# Patient Record
Sex: MALE | Race: OTHER | NOT HISPANIC OR LATINO | URBAN - METROPOLITAN AREA
[De-identification: names, ages, dates, MRNs, and addresses within clinical notes are randomized per-mention and may not be internally consistent; named-entity substitution may affect disease eponyms.]

---

## 2017-05-02 ENCOUNTER — EMERGENCY (EMERGENCY)
Facility: HOSPITAL | Age: 62
LOS: 0 days | Discharge: HOME | End: 2017-05-02
Attending: EMERGENCY MEDICINE
Payer: COMMERCIAL

## 2017-05-02 VITALS
TEMPERATURE: 98 F | SYSTOLIC BLOOD PRESSURE: 125 MMHG | HEART RATE: 72 BPM | DIASTOLIC BLOOD PRESSURE: 88 MMHG | WEIGHT: 315 LBS | RESPIRATION RATE: 20 BRPM | HEIGHT: 70 IN | OXYGEN SATURATION: 95 %

## 2017-05-02 VITALS
HEART RATE: 67 BPM | SYSTOLIC BLOOD PRESSURE: 146 MMHG | RESPIRATION RATE: 17 BRPM | OXYGEN SATURATION: 100 % | DIASTOLIC BLOOD PRESSURE: 76 MMHG | TEMPERATURE: 98 F

## 2017-05-02 PROCEDURE — 93971 EXTREMITY STUDY: CPT | Mod: 26,RT

## 2017-05-02 PROCEDURE — 99284 EMERGENCY DEPT VISIT MOD MDM: CPT | Mod: 25

## 2017-05-02 PROCEDURE — 73562 X-RAY EXAM OF KNEE 3: CPT | Mod: 26,RT

## 2017-05-02 RX ORDER — IBUPROFEN 200 MG
1 TABLET ORAL
Qty: 15 | Refills: 0 | OUTPATIENT
Start: 2017-05-02 | End: 2017-05-07

## 2017-05-02 NOTE — ED PROVIDER NOTE - OBJECTIVE STATEMENT
61 years old male walked in with wife c/o pain 7/10 to right knee and right calf since yesterday. Pt sts he fell on his right knee while he was running yesterday and pt sts he felt the tightness to right calf just before he fell. Pt denies headache, loc, dizziness, blurred visions, light sensitivities, neck/back pain, sob, chest pain, focal/distal weakness or numbness, cough, sob, chest pain, nausea, vomiting, fever, chills, abd pain, hematuria, dysuria or irregular bowel movements.

## 2017-05-02 NOTE — ED PROVIDER NOTE - CONSTITUTIONAL, MLM
normal... Well appearing, well nourished, awake, alert, oriented to person, place, time/situation and in no apparent distress. Speaking in clear full sentence no nasal flaring no shoulders retractions appears comfortable while sitting up at the edge of the stretcher in a bright light room

## 2017-05-02 NOTE — ED PROVIDER NOTE - PROGRESS NOTE DETAILS
pt is ambulating with normal gaits denies focal/distal weakness or numbness. Pt is given right knee immobilizer and crutches

## 2017-05-02 NOTE — ED PROVIDER NOTE - MEDICAL DECISION MAKING DETAILS
hx, exam, xray of right knee, us of right leg, reeval Pt has no distal lower extremity pain or numbness the calf is not swelling compartment syndrome is unlikely. Pt however is advised to return immediately in pain persist or worsen or increasing swelling

## 2017-05-02 NOTE — ED PROVIDER NOTE - RESPIRATORY, MLM
I have personally performed a face to face diagnostic evaluation on this patient. I have reviewed the PA note and agree with the history, exam, and plan of care, except as noted. Breath sounds clear and equal bilaterally.

## 2017-05-02 NOTE — ED ADULT NURSE NOTE - OBJECTIVE STATEMENT
patient received, alert and oriented x3, complaining of right calf pain and stiffness from falling yesterday around 1800. patient denies hitting head, denies n/v/d.

## 2017-05-02 NOTE — ED PROCEDURE NOTE - NS ED PERI VASCULAR NEG
no swelling/no cyanosis of extremity/fingers/toes warm to touch/no paresthesia/capillary refill time < 2 seconds
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
## 2017-05-03 DIAGNOSIS — M10.9 GOUT, UNSPECIFIED: ICD-10-CM

## 2017-05-03 DIAGNOSIS — M25.561 PAIN IN RIGHT KNEE: ICD-10-CM
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
## 2017-05-03 DIAGNOSIS — E78.00 PURE HYPERCHOLESTEROLEMIA, UNSPECIFIED: ICD-10-CM

## 2017-05-03 DIAGNOSIS — I10 ESSENTIAL (PRIMARY) HYPERTENSION: ICD-10-CM
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
## 2017-05-03 DIAGNOSIS — S80.01XA CONTUSION OF RIGHT KNEE, INITIAL ENCOUNTER: ICD-10-CM

## 2017-12-12 ENCOUNTER — EMERGENCY (EMERGENCY)
Facility: HOSPITAL | Age: 62
LOS: 0 days | Discharge: HOME | End: 2017-12-12
Attending: EMERGENCY MEDICINE
Payer: COMMERCIAL

## 2017-12-12 VITALS
RESPIRATION RATE: 16 BRPM | OXYGEN SATURATION: 96 % | DIASTOLIC BLOOD PRESSURE: 85 MMHG | SYSTOLIC BLOOD PRESSURE: 134 MMHG | TEMPERATURE: 98 F | HEART RATE: 61 BPM

## 2017-12-12 VITALS
HEIGHT: 70 IN | WEIGHT: 315 LBS | TEMPERATURE: 98 F | SYSTOLIC BLOOD PRESSURE: 166 MMHG | DIASTOLIC BLOOD PRESSURE: 96 MMHG | HEART RATE: 66 BPM | RESPIRATION RATE: 15 BRPM | OXYGEN SATURATION: 97 %

## 2017-12-12 DIAGNOSIS — I10 ESSENTIAL (PRIMARY) HYPERTENSION: ICD-10-CM
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
## 2017-12-12 DIAGNOSIS — M54.5 LOW BACK PAIN: ICD-10-CM

## 2017-12-12 DIAGNOSIS — S39.012A STRAIN OF MUSCLE, FASCIA AND TENDON OF LOWER BACK, INITIAL ENCOUNTER: ICD-10-CM

## 2017-12-12 DIAGNOSIS — Z79.1 LONG TERM (CURRENT) USE OF NON-STEROIDAL ANTI-INFLAMMATORIES (NSAID): ICD-10-CM

## 2017-12-12 DIAGNOSIS — G89.29 OTHER CHRONIC PAIN: ICD-10-CM
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
## 2017-12-12 PROCEDURE — 99284 EMERGENCY DEPT VISIT MOD MDM: CPT | Mod: 25

## 2017-12-12 RX ORDER — IBUPROFEN 200 MG
600 TABLET ORAL ONCE
Qty: 0 | Refills: 0 | Status: COMPLETED | OUTPATIENT
Start: 2017-12-12 | End: 2017-12-12

## 2017-12-12 RX ORDER — IBUPROFEN 200 MG
1 TABLET ORAL
Qty: 28 | Refills: 0
Start: 2017-12-12 | End: 2017-12-18

## 2017-12-12 RX ORDER — CYCLOBENZAPRINE HYDROCHLORIDE 10 MG/1
1 TABLET, FILM COATED ORAL
Qty: 15 | Refills: 0
Start: 2017-12-12 | End: 2017-12-16

## 2017-12-12 RX ORDER — CYCLOBENZAPRINE HYDROCHLORIDE 10 MG/1
10 TABLET, FILM COATED ORAL ONCE
Qty: 0 | Refills: 0 | Status: COMPLETED | OUTPATIENT
Start: 2017-12-12 | End: 2017-12-12

## 2017-12-12 RX ADMIN — Medication 600 MILLIGRAM(S): at 08:32

## 2017-12-12 RX ADMIN — CYCLOBENZAPRINE HYDROCHLORIDE 10 MILLIGRAM(S): 10 TABLET, FILM COATED ORAL at 08:30

## 2017-12-12 NOTE — ED ADULT TRIAGE NOTE - CHIEF COMPLAINT QUOTE
I have lower back pains started last Monday. He walked from [address]. Afterwards he had the pain. The pain is excruciating

## 2017-12-12 NOTE — ED ADULT NURSE NOTE - OBJECTIVE STATEMENT
Pt c/o of lower back pain 7/10 radiating upwards toward "center of back". Pt states that pain started last monday after a morning walk. Pt states he took alleeve otc with no relief.

## 2019-03-15 NOTE — ED ADULT TRIAGE NOTE - CADM TRG TX PRIOR TO ARRIVAL
medications
I performed the initial face to face bedside interview with this patient regarding history of present illness, review of symptoms and past medical, social and family history.  I completed an independent physical examination.  I was the initial provider who evaluated this patient.  The history, review of symptoms and examination was documented by the PA in my presence and I attest to the accuracy of the documentation.  I have signed out the follow up of any pending tests (i.e. labs, radiological studies) to the PA.  I have discussed the patient’s plan of care and disposition with the PA.

## 2021-12-28 PROBLEM — M10.9 GOUT, UNSPECIFIED: Chronic | Status: ACTIVE | Noted: 2017-05-02

## 2021-12-28 PROBLEM — E78.00 PURE HYPERCHOLESTEROLEMIA, UNSPECIFIED: Chronic | Status: ACTIVE | Noted: 2017-05-02

## 2021-12-28 PROBLEM — I10 ESSENTIAL (PRIMARY) HYPERTENSION: Chronic | Status: ACTIVE | Noted: 2017-05-02

## 2022-04-14 ENCOUNTER — NON-APPOINTMENT (OUTPATIENT)
Age: 67
End: 2022-04-14

## 2022-04-14 ENCOUNTER — APPOINTMENT (OUTPATIENT)
Dept: GASTROENTEROLOGY | Facility: CLINIC | Age: 67
End: 2022-04-14
Payer: MEDICARE

## 2022-04-14 VITALS
WEIGHT: 315 LBS | DIASTOLIC BLOOD PRESSURE: 85 MMHG | SYSTOLIC BLOOD PRESSURE: 140 MMHG | BODY MASS INDEX: 45.1 KG/M2 | OXYGEN SATURATION: 98 % | TEMPERATURE: 98.8 F | HEIGHT: 70 IN | HEART RATE: 78 BPM

## 2022-04-14 DIAGNOSIS — I10 ESSENTIAL (PRIMARY) HYPERTENSION: ICD-10-CM

## 2022-04-14 DIAGNOSIS — Z78.9 OTHER SPECIFIED HEALTH STATUS: ICD-10-CM

## 2022-04-14 DIAGNOSIS — E66.01 MORBID (SEVERE) OBESITY DUE TO EXCESS CALORIES: ICD-10-CM

## 2022-04-14 DIAGNOSIS — Z12.11 ENCOUNTER FOR SCREENING FOR MALIGNANT NEOPLASM OF COLON: ICD-10-CM

## 2022-04-14 DIAGNOSIS — M10.9 GOUT, UNSPECIFIED: ICD-10-CM

## 2022-04-14 DIAGNOSIS — K59.09 OTHER CONSTIPATION: ICD-10-CM

## 2022-04-14 PROBLEM — Z00.00 ENCOUNTER FOR PREVENTIVE HEALTH EXAMINATION: Status: ACTIVE | Noted: 2022-04-14

## 2022-04-14 PROCEDURE — 99203 OFFICE O/P NEW LOW 30 MIN: CPT

## 2022-04-14 RX ORDER — ALLOPURINOL 300 MG/1
300 TABLET ORAL
Refills: 0 | Status: ACTIVE | COMMUNITY

## 2022-04-14 RX ORDER — SODIUM SULFATE, POTASSIUM SULFATE, MAGNESIUM SULFATE 17.5; 3.13; 1.6 G/ML; G/ML; G/ML
17.5-3.13-1.6 SOLUTION, CONCENTRATE ORAL
Qty: 1 | Refills: 0 | Status: DISCONTINUED | COMMUNITY
Start: 2022-04-14 | End: 2022-04-14

## 2022-04-14 RX ORDER — POLYETHYLENE GLYCOL 3350 AND ELECTROLYTES WITH LEMON FLAVOR 236; 22.74; 6.74; 5.86; 2.97 G/4L; G/4L; G/4L; G/4L; G/4L
236 POWDER, FOR SOLUTION ORAL
Qty: 1 | Refills: 0 | Status: ACTIVE | COMMUNITY
Start: 2022-04-14 | End: 1900-01-01

## 2022-04-14 RX ORDER — AMLODIPINE BESYLATE 5 MG/1
TABLET ORAL
Refills: 0 | Status: ACTIVE | COMMUNITY

## 2022-04-14 RX ORDER — METOPROLOL TARTRATE 100 MG/1
100 TABLET, FILM COATED ORAL
Refills: 0 | Status: ACTIVE | COMMUNITY

## 2022-04-22 ENCOUNTER — NON-APPOINTMENT (OUTPATIENT)
Age: 67
End: 2022-04-22

## 2022-05-19 ENCOUNTER — OUTPATIENT (OUTPATIENT)
Dept: OUTPATIENT SERVICES | Facility: HOSPITAL | Age: 67
LOS: 1 days | End: 2022-05-19
Payer: MEDICARE

## 2022-05-19 VITALS
WEIGHT: 315 LBS | RESPIRATION RATE: 17 BRPM | HEIGHT: 69.5 IN | TEMPERATURE: 97 F | HEART RATE: 84 BPM | SYSTOLIC BLOOD PRESSURE: 139 MMHG | OXYGEN SATURATION: 96 % | DIASTOLIC BLOOD PRESSURE: 94 MMHG

## 2022-05-19 DIAGNOSIS — R06.83 SNORING: ICD-10-CM

## 2022-05-19 DIAGNOSIS — Z12.11 ENCOUNTER FOR SCREENING FOR MALIGNANT NEOPLASM OF COLON: ICD-10-CM

## 2022-05-19 DIAGNOSIS — R94.31 ABNORMAL ELECTROCARDIOGRAM [ECG] [EKG]: ICD-10-CM

## 2022-05-19 DIAGNOSIS — Z98.890 OTHER SPECIFIED POSTPROCEDURAL STATES: Chronic | ICD-10-CM

## 2022-05-19 DIAGNOSIS — E66.01 MORBID (SEVERE) OBESITY DUE TO EXCESS CALORIES: ICD-10-CM

## 2022-05-19 DIAGNOSIS — I10 ESSENTIAL (PRIMARY) HYPERTENSION: ICD-10-CM

## 2022-05-19 LAB
ANION GAP SERPL CALC-SCNC: 10 MMOL/L — SIGNIFICANT CHANGE UP (ref 7–14)
BUN SERPL-MCNC: 18 MG/DL — SIGNIFICANT CHANGE UP (ref 7–23)
CALCIUM SERPL-MCNC: 9 MG/DL — SIGNIFICANT CHANGE UP (ref 8.4–10.5)
CHLORIDE SERPL-SCNC: 104 MMOL/L — SIGNIFICANT CHANGE UP (ref 98–107)
CO2 SERPL-SCNC: 26 MMOL/L — SIGNIFICANT CHANGE UP (ref 22–31)
CREAT SERPL-MCNC: 0.84 MG/DL — SIGNIFICANT CHANGE UP (ref 0.5–1.3)
EGFR: 96 ML/MIN/1.73M2 — SIGNIFICANT CHANGE UP
GLUCOSE SERPL-MCNC: 103 MG/DL — HIGH (ref 70–99)
HCT VFR BLD CALC: 49.9 % — SIGNIFICANT CHANGE UP (ref 39–50)
HGB BLD-MCNC: 15.3 G/DL — SIGNIFICANT CHANGE UP (ref 13–17)
MCHC RBC-ENTMCNC: 19.8 PG — LOW (ref 27–34)
MCHC RBC-ENTMCNC: 30.7 GM/DL — LOW (ref 32–36)
MCV RBC AUTO: 64.6 FL — LOW (ref 80–100)
NRBC # BLD: 0 /100 WBCS — SIGNIFICANT CHANGE UP
NRBC # FLD: 0 K/UL — SIGNIFICANT CHANGE UP
PLATELET # BLD AUTO: 245 K/UL — SIGNIFICANT CHANGE UP (ref 150–400)
POTASSIUM SERPL-MCNC: 4.6 MMOL/L — SIGNIFICANT CHANGE UP (ref 3.5–5.3)
POTASSIUM SERPL-SCNC: 4.6 MMOL/L — SIGNIFICANT CHANGE UP (ref 3.5–5.3)
RBC # BLD: >7 M/UL — HIGH (ref 4.2–5.8)
RBC # FLD: 19.8 % — HIGH (ref 10.3–14.5)
SODIUM SERPL-SCNC: 140 MMOL/L — SIGNIFICANT CHANGE UP (ref 135–145)
WBC # BLD: 6.57 K/UL — SIGNIFICANT CHANGE UP (ref 3.8–10.5)
WBC # FLD AUTO: 6.57 K/UL — SIGNIFICANT CHANGE UP (ref 3.8–10.5)

## 2022-05-19 PROCEDURE — 93010 ELECTROCARDIOGRAM REPORT: CPT

## 2022-05-19 RX ORDER — ALLOPURINOL 300 MG
1 TABLET ORAL
Qty: 0 | Refills: 0 | DISCHARGE

## 2022-05-19 RX ORDER — ROSUVASTATIN CALCIUM 5 MG/1
1 TABLET ORAL
Qty: 0 | Refills: 0 | DISCHARGE

## 2022-05-19 RX ORDER — METOPROLOL TARTRATE 50 MG
1 TABLET ORAL
Qty: 0 | Refills: 0 | DISCHARGE

## 2022-05-19 RX ORDER — AMLODIPINE BESYLATE 2.5 MG/1
1 TABLET ORAL
Qty: 0 | Refills: 0 | DISCHARGE

## 2022-05-19 RX ORDER — L.ACIDOPH/B.ANIMALIS/B.LONGUM 15B CELL
1 CAPSULE ORAL
Qty: 0 | Refills: 0 | DISCHARGE

## 2022-05-19 NOTE — H&P PST ADULT - NEGATIVE MUSCULOSKELETAL SYMPTOMS
no arthritis/no joint swelling/no neck pain no arthritis/no joint swelling/no muscle cramps/no muscle weakness/no neck pain

## 2022-05-19 NOTE — H&P PST ADULT - PROBLEM SELECTOR PLAN 1
preop for colonoscopy on 6/3/22  preop instructions given, pt verbalized understanding  GI prophylaxis provided  pt informed COVID testing must be done 72 hours prior to surgery

## 2022-05-19 NOTE — H&P PST ADULT - HISTORY OF PRESENT ILLNESS
67 y/o male presents to PST preop for colonoscopy. preop dx of encounter screening for malignant neoplasm of colon. pt's last screening colonoscopy was 8 yrs ago. Pt states "baby polyps were removed".

## 2022-05-19 NOTE — H&P PST ADULT - NSICDXFAMILYHX_GEN_ALL_CORE_FT
FAMILY HISTORY:  Father  Still living? Unknown  FH: diabetes mellitus, Age at diagnosis: Age Unknown    Mother  Still living? Unknown  Family history of pacemaker, Age at diagnosis: Age Unknown

## 2022-05-19 NOTE — H&P PST ADULT - NSICDXPASTMEDICALHX_GEN_ALL_CORE_FT
PAST MEDICAL HISTORY:  Encounter for screening for malignant neoplasm of colon     Gout     Hypercholesteremia     Hypertension     Morbid obesity

## 2022-05-19 NOTE — H&P PST ADULT - NSANTHOSAYNRD_GEN_A_CORE
No. SHILOH screening performed.  STOP BANG Legend: 0-2 = LOW Risk; 3-4 = INTERMEDIATE Risk; 5-8 = HIGH Risk

## 2022-06-03 ENCOUNTER — OUTPATIENT (OUTPATIENT)
Dept: OUTPATIENT SERVICES | Facility: HOSPITAL | Age: 67
LOS: 1 days | Discharge: HOME | End: 2022-06-03
Payer: MEDICARE

## 2022-06-03 ENCOUNTER — RESULT REVIEW (OUTPATIENT)
Age: 67
End: 2022-06-03

## 2022-06-03 ENCOUNTER — APPOINTMENT (OUTPATIENT)
Dept: GASTROENTEROLOGY | Facility: HOSPITAL | Age: 67
End: 2022-06-03

## 2022-06-03 VITALS
DIASTOLIC BLOOD PRESSURE: 59 MMHG | OXYGEN SATURATION: 93 % | TEMPERATURE: 98 F | SYSTOLIC BLOOD PRESSURE: 103 MMHG | HEART RATE: 73 BPM | RESPIRATION RATE: 20 BRPM

## 2022-06-03 VITALS
SYSTOLIC BLOOD PRESSURE: 122 MMHG | HEART RATE: 64 BPM | DIASTOLIC BLOOD PRESSURE: 82 MMHG | OXYGEN SATURATION: 93 % | RESPIRATION RATE: 19 BRPM

## 2022-06-03 DIAGNOSIS — Z98.890 OTHER SPECIFIED POSTPROCEDURAL STATES: Chronic | ICD-10-CM

## 2022-06-03 DIAGNOSIS — Z12.11 ENCOUNTER FOR SCREENING FOR MALIGNANT NEOPLASM OF COLON: ICD-10-CM

## 2022-06-03 PROCEDURE — 88305 TISSUE EXAM BY PATHOLOGIST: CPT | Mod: 26

## 2022-06-03 PROCEDURE — 45385 COLONOSCOPY W/LESION REMOVAL: CPT

## 2022-06-08 LAB — SURGICAL PATHOLOGY STUDY: SIGNIFICANT CHANGE UP

## 2024-04-18 NOTE — ED PROVIDER NOTE - SKIN NEGATIVE STATEMENT, MLM
no abrasions, no jaundice, no lesions, no pruritis, and no rashes.
PAST SURGICAL HISTORY:  H/O knee surgery

## 2024-10-21 NOTE — ED ADULT NURSE NOTE - NS TRANSFER PATIENT BELONGINGS
Caller: NANCY    Relationship:     Best call back number: [phone]    What was the call regarding: NEED TO FAX EITHER THE LAST OFFICE NOTE OR CLINICAL NOTES -[phone], THIS IS NEEDED DUE TO A TEST DR. HARLEY HAS ORDERED ON THE PATIENT.   Clothing

## 2024-10-30 ENCOUNTER — INPATIENT (INPATIENT)
Facility: HOSPITAL | Age: 69
LOS: 14 days | Discharge: HOME HEALTH | End: 2024-11-14
Attending: INTERNAL MEDICINE | Admitting: INTERNAL MEDICINE
Payer: MEDICARE

## 2024-10-30 VITALS
HEART RATE: 121 BPM | SYSTOLIC BLOOD PRESSURE: 121 MMHG | HEIGHT: 70 IN | TEMPERATURE: 98 F | OXYGEN SATURATION: 97 % | DIASTOLIC BLOOD PRESSURE: 86 MMHG | RESPIRATION RATE: 20 BRPM | WEIGHT: 315 LBS

## 2024-10-30 DIAGNOSIS — Z98.890 OTHER SPECIFIED POSTPROCEDURAL STATES: Chronic | ICD-10-CM

## 2024-10-30 LAB
ALBUMIN SERPL ELPH-MCNC: 3.8 G/DL — SIGNIFICANT CHANGE UP (ref 3.3–5)
ALP SERPL-CCNC: 60 U/L — SIGNIFICANT CHANGE UP (ref 40–120)
ALT FLD-CCNC: 29 U/L — SIGNIFICANT CHANGE UP (ref 12–78)
ANION GAP SERPL CALC-SCNC: 8 MMOL/L — SIGNIFICANT CHANGE UP (ref 5–17)
APTT BLD: 39.5 SEC — HIGH (ref 24.5–35.6)
AST SERPL-CCNC: 33 U/L — SIGNIFICANT CHANGE UP (ref 15–37)
BASOPHILS # BLD AUTO: 0.06 K/UL — SIGNIFICANT CHANGE UP (ref 0–0.2)
BASOPHILS NFR BLD AUTO: 0.6 % — SIGNIFICANT CHANGE UP (ref 0–2)
BILIRUB SERPL-MCNC: 1.9 MG/DL — HIGH (ref 0.2–1.2)
BUN SERPL-MCNC: 22 MG/DL — SIGNIFICANT CHANGE UP (ref 7–23)
CALCIUM SERPL-MCNC: 8.9 MG/DL — SIGNIFICANT CHANGE UP (ref 8.5–10.1)
CHLORIDE SERPL-SCNC: 109 MMOL/L — HIGH (ref 96–108)
CO2 SERPL-SCNC: 25 MMOL/L — SIGNIFICANT CHANGE UP (ref 22–31)
CREAT SERPL-MCNC: 1.05 MG/DL — SIGNIFICANT CHANGE UP (ref 0.5–1.3)
D DIMER BLD IA.RAPID-MCNC: 424 NG/ML DDU — HIGH
EGFR: 77 ML/MIN/1.73M2 — SIGNIFICANT CHANGE UP
EOSINOPHIL # BLD AUTO: 0.16 K/UL — SIGNIFICANT CHANGE UP (ref 0–0.5)
EOSINOPHIL NFR BLD AUTO: 1.7 % — SIGNIFICANT CHANGE UP (ref 0–6)
FLUAV AG NPH QL: SIGNIFICANT CHANGE UP
FLUBV AG NPH QL: SIGNIFICANT CHANGE UP
GLUCOSE SERPL-MCNC: 118 MG/DL — HIGH (ref 70–99)
HCT VFR BLD CALC: 44.8 % — SIGNIFICANT CHANGE UP (ref 39–50)
HGB BLD-MCNC: 13.6 G/DL — SIGNIFICANT CHANGE UP (ref 13–17)
IMM GRANULOCYTES NFR BLD AUTO: 0.2 % — SIGNIFICANT CHANGE UP (ref 0–0.9)
INR BLD: 1.39 RATIO — HIGH (ref 0.85–1.16)
LIDOCAIN IGE QN: 35 U/L — SIGNIFICANT CHANGE UP (ref 13–75)
LYMPHOCYTES # BLD AUTO: 1.49 K/UL — SIGNIFICANT CHANGE UP (ref 1–3.3)
LYMPHOCYTES # BLD AUTO: 15.7 % — SIGNIFICANT CHANGE UP (ref 13–44)
MAGNESIUM SERPL-MCNC: 1.9 MG/DL — SIGNIFICANT CHANGE UP (ref 1.6–2.6)
MCHC RBC-ENTMCNC: 19.6 PG — LOW (ref 27–34)
MCHC RBC-ENTMCNC: 30.4 G/DL — LOW (ref 32–36)
MCV RBC AUTO: 64.6 FL — LOW (ref 80–100)
MONOCYTES # BLD AUTO: 1.33 K/UL — HIGH (ref 0–0.9)
MONOCYTES NFR BLD AUTO: 14 % — SIGNIFICANT CHANGE UP (ref 2–14)
NEUTROPHILS # BLD AUTO: 6.43 K/UL — SIGNIFICANT CHANGE UP (ref 1.8–7.4)
NEUTROPHILS NFR BLD AUTO: 67.8 % — SIGNIFICANT CHANGE UP (ref 43–77)
NRBC # BLD: 0 /100 WBCS — SIGNIFICANT CHANGE UP (ref 0–0)
NT-PROBNP SERPL-SCNC: 2339 PG/ML — HIGH (ref 0–125)
PLATELET # BLD AUTO: 212 K/UL — SIGNIFICANT CHANGE UP (ref 150–400)
POTASSIUM SERPL-MCNC: 4.7 MMOL/L — SIGNIFICANT CHANGE UP (ref 3.5–5.3)
POTASSIUM SERPL-SCNC: 4.7 MMOL/L — SIGNIFICANT CHANGE UP (ref 3.5–5.3)
PROT SERPL-MCNC: 7.2 GM/DL — SIGNIFICANT CHANGE UP (ref 6–8.3)
PROTHROM AB SERPL-ACNC: 15.7 SEC — HIGH (ref 9.9–13.4)
RBC # BLD: 6.94 M/UL — HIGH (ref 4.2–5.8)
RBC # FLD: 18.7 % — HIGH (ref 10.3–14.5)
SARS-COV-2 RNA SPEC QL NAA+PROBE: SIGNIFICANT CHANGE UP
SODIUM SERPL-SCNC: 142 MMOL/L — SIGNIFICANT CHANGE UP (ref 135–145)
TROPONIN I, HIGH SENSITIVITY RESULT: 20 NG/L — SIGNIFICANT CHANGE UP
WBC # BLD: 9.49 K/UL — SIGNIFICANT CHANGE UP (ref 3.8–10.5)
WBC # FLD AUTO: 9.49 K/UL — SIGNIFICANT CHANGE UP (ref 3.8–10.5)

## 2024-10-30 PROCEDURE — 93010 ELECTROCARDIOGRAM REPORT: CPT

## 2024-10-30 PROCEDURE — 71275 CT ANGIOGRAPHY CHEST: CPT | Mod: 26,MC

## 2024-10-30 PROCEDURE — 71046 X-RAY EXAM CHEST 2 VIEWS: CPT | Mod: 26

## 2024-10-30 PROCEDURE — 99285 EMERGENCY DEPT VISIT HI MDM: CPT

## 2024-10-30 RX ORDER — METOPROLOL TARTRATE 50 MG
5 TABLET ORAL ONCE
Refills: 0 | Status: COMPLETED | OUTPATIENT
Start: 2024-10-30 | End: 2024-10-30

## 2024-10-30 RX ORDER — HEPARIN SODIUM 10000 [USP'U]/ML
INJECTION INTRAVENOUS; SUBCUTANEOUS
Qty: 25000 | Refills: 0 | Status: DISCONTINUED | OUTPATIENT
Start: 2024-10-30 | End: 2024-11-01

## 2024-10-30 RX ORDER — ALLOPURINOL 100 MG
1 TABLET ORAL
Refills: 0 | DISCHARGE

## 2024-10-30 RX ORDER — ROSUVASTATIN CALCIUM 10 MG
1 TABLET ORAL
Refills: 0 | DISCHARGE

## 2024-10-30 RX ORDER — HEPARIN SODIUM 10000 [USP'U]/ML
10000 INJECTION INTRAVENOUS; SUBCUTANEOUS ONCE
Refills: 0 | Status: COMPLETED | OUTPATIENT
Start: 2024-10-30 | End: 2024-10-30

## 2024-10-30 RX ORDER — HEPARIN SODIUM 10000 [USP'U]/ML
10000 INJECTION INTRAVENOUS; SUBCUTANEOUS EVERY 6 HOURS
Refills: 0 | Status: DISCONTINUED | OUTPATIENT
Start: 2024-10-30 | End: 2024-11-01

## 2024-10-30 RX ORDER — FUROSEMIDE 40 MG
80 TABLET ORAL ONCE
Refills: 0 | Status: COMPLETED | OUTPATIENT
Start: 2024-10-30 | End: 2024-10-30

## 2024-10-30 RX ORDER — ADENOSINE 3 MG/ML
6 INJECTION INTRAVENOUS ONCE
Refills: 0 | Status: DISCONTINUED | OUTPATIENT
Start: 2024-10-30 | End: 2024-10-30

## 2024-10-30 RX ORDER — HEPARIN SODIUM 10000 [USP'U]/ML
5000 INJECTION INTRAVENOUS; SUBCUTANEOUS EVERY 6 HOURS
Refills: 0 | Status: DISCONTINUED | OUTPATIENT
Start: 2024-10-30 | End: 2024-11-01

## 2024-10-30 RX ADMIN — HEPARIN SODIUM 2400 UNIT(S)/HR: 10000 INJECTION INTRAVENOUS; SUBCUTANEOUS at 23:29

## 2024-10-30 RX ADMIN — Medication 5 MILLIGRAM(S): at 22:05

## 2024-10-30 RX ADMIN — Medication 5 MILLIGRAM(S): at 21:10

## 2024-10-30 RX ADMIN — Medication 5 MILLIGRAM(S): at 23:26

## 2024-10-30 RX ADMIN — HEPARIN SODIUM 10000 UNIT(S): 10000 INJECTION INTRAVENOUS; SUBCUTANEOUS at 23:30

## 2024-10-30 RX ADMIN — Medication 80 MILLIGRAM(S): at 21:03

## 2024-10-30 NOTE — ED PROVIDER NOTE - CLINICAL SUMMARY MEDICAL DECISION MAKING FREE TEXT BOX
68M xfr from primary care physicians office for new onset a fib and edema concerning for chf  -ecg, monitor, pulse ox  -supplemental o2 as his saturation is 88%RA on my exam  -cbc, cmp, coags, ddimer, trop, bnp  -cxr  -diuretics, I/Os  -adenosine 68M xfr from primary care physicians office for new onset a fib and edema concerning for chf  -ecg, monitor, pulse ox  -supplemental o2 as his saturation is 88%RA on my exam  -cbc, cmp, coags, ddimer, trop, bnp  -cxr  -diuretics, I/Os  -rate control 68M xfr from primary care physicians office for new onset a fib and edema concerning for chf  -ecg, monitor, pulse ox  -supplemental o2 as his saturation is 88%RA on my exam  -cbc, cmp, coags, ddimer, trop, bnp  -cxr  -diuretics, I/Os  -rate control  -as chadsvasc 2 and hasbled 1; benefit of ac > risk of bleeding so will start heparin gtt

## 2024-10-30 NOTE — ED ADULT NURSE NOTE - NSFALLRISKINTERV_ED_ALL_ED

## 2024-10-30 NOTE — ED ADULT NURSE NOTE - OBJECTIVE STATEMENT
69 yo M PMHx HTN, HLD, SHILOH BIBEMS from PCP for new onset afib (no PMHx afib) a/w SOB, TRINH, wheezing, and BLE edema. Pt aox3, amb at baseline but TRINH at this time. Spo2 RA 88% > 6LNC. Pt also noted to have BLE venous ulcers. Audible upper airway wheezing noted.
no

## 2024-10-30 NOTE — ED PROVIDER NOTE - CARE PLAN
1 Principal Discharge DX:	Acute congestive heart failure  Secondary Diagnosis:	Rapid atrial fibrillation

## 2024-10-30 NOTE — ED ADULT NURSE NOTE - CHIEF COMPLAINT QUOTE
BIBA, sent from doctors office, Bilateral LE edema, SOB, wheezing on exertion,  distended abd x 2 weeks.  pt was A-fib in doctors office, no pmh of a-fib.   (PMH-htn, hld, sleep apnea)

## 2024-10-30 NOTE — ED ADULT TRIAGE NOTE - CHIEF COMPLAINT QUOTE
BIBA, sent from doctors office,  LE edema, SOB, distended abd x 2 weeks.  pt was A-fib in doctors office, no pmh of a-fib.   (PMH-htn, hld, sleep apnea) BIBA, sent from doctors office, Bilateral LE edema, SOB, wheezing on exertion,  distended abd x 2 weeks.  pt was A-fib in doctors office, no pmh of a-fib.   (PMH-htn, hld, sleep apnea)

## 2024-10-30 NOTE — ED PROVIDER NOTE - OBJECTIVE STATEMENT
The patient was transferred from his primary care physicians office when an ECG in the office showed new onset atrial fibrillation. He was there because he has been having bilateral leg edema associated with orthopnea and exertional dyspnea gradually worsening over the last 2 weeks. Denies ever having chest pain and syncope.

## 2024-10-30 NOTE — ED PROVIDER NOTE - PROGRESS NOTE DETAILS
As pocus reveals grossly normal contractility and no pericardial effusion will order dilitazem. - Marco VEGA Given difficulty achieving rate control, I discussed the case with Dr. Vaughn Velazquez of cardiology, and he agrees with starting heparin gtt and states if pocus shows grossly normal contractility he can also receive IV diltiazem for rate control. Ángel Lara MD MD Cruz: Patient received on sign-out from Dr. Lara. male w/ HF presenting in new onset Afib s/p metoprolol & diltiazem w/ rate control achieved. ICU consulted with recs for fixed diltiazem ggt and tele admission. Pt in no acute distress. repeat EKG with rate controlled Afib. tele admit, discussed with hospitalist

## 2024-10-30 NOTE — ED PROVIDER NOTE - CARDIAC, MLM
Tachycardic, irregularly irregular rhythm.  Heart sounds S1, S2.  No murmurs, rubs or gallops. There is 2+ pitting edema of both legs.

## 2024-10-31 DIAGNOSIS — M10.9 GOUT, UNSPECIFIED: ICD-10-CM

## 2024-10-31 DIAGNOSIS — I50.9 HEART FAILURE, UNSPECIFIED: ICD-10-CM

## 2024-10-31 DIAGNOSIS — I10 ESSENTIAL (PRIMARY) HYPERTENSION: ICD-10-CM

## 2024-10-31 DIAGNOSIS — I48.91 UNSPECIFIED ATRIAL FIBRILLATION: ICD-10-CM

## 2024-10-31 PROBLEM — E66.01 MORBID (SEVERE) OBESITY DUE TO EXCESS CALORIES: Chronic | Status: ACTIVE | Noted: 2022-05-19

## 2024-10-31 LAB
ALBUMIN SERPL ELPH-MCNC: 3.6 G/DL — SIGNIFICANT CHANGE UP (ref 3.3–5)
ALP SERPL-CCNC: 58 U/L — SIGNIFICANT CHANGE UP (ref 40–120)
ALT FLD-CCNC: 30 U/L — SIGNIFICANT CHANGE UP (ref 12–78)
ANION GAP SERPL CALC-SCNC: 8 MMOL/L — SIGNIFICANT CHANGE UP (ref 5–17)
APTT BLD: >200 SEC — CRITICAL HIGH (ref 24.5–35.6)
APTT BLD: >200 SEC — CRITICAL HIGH (ref 24.5–35.6)
AST SERPL-CCNC: 36 U/L — SIGNIFICANT CHANGE UP (ref 15–37)
BASOPHILS # BLD AUTO: 0.05 K/UL — SIGNIFICANT CHANGE UP (ref 0–0.2)
BASOPHILS NFR BLD AUTO: 0.5 % — SIGNIFICANT CHANGE UP (ref 0–2)
BILIRUB SERPL-MCNC: 1.9 MG/DL — HIGH (ref 0.2–1.2)
BUN SERPL-MCNC: 22 MG/DL — SIGNIFICANT CHANGE UP (ref 7–23)
CALCIUM SERPL-MCNC: 8.8 MG/DL — SIGNIFICANT CHANGE UP (ref 8.5–10.1)
CHLORIDE SERPL-SCNC: 105 MMOL/L — SIGNIFICANT CHANGE UP (ref 96–108)
CO2 SERPL-SCNC: 29 MMOL/L — SIGNIFICANT CHANGE UP (ref 22–31)
CREAT SERPL-MCNC: 1.08 MG/DL — SIGNIFICANT CHANGE UP (ref 0.5–1.3)
EGFR: 74 ML/MIN/1.73M2 — SIGNIFICANT CHANGE UP
EOSINOPHIL # BLD AUTO: 0.22 K/UL — SIGNIFICANT CHANGE UP (ref 0–0.5)
EOSINOPHIL NFR BLD AUTO: 2.4 % — SIGNIFICANT CHANGE UP (ref 0–6)
GAS PNL BLDA: SIGNIFICANT CHANGE UP
GLUCOSE BLDC GLUCOMTR-MCNC: 119 MG/DL — HIGH (ref 70–99)
GLUCOSE SERPL-MCNC: 100 MG/DL — HIGH (ref 70–99)
HCT VFR BLD CALC: 40.8 % — SIGNIFICANT CHANGE UP (ref 39–50)
HCT VFR BLD CALC: 44.4 % — SIGNIFICANT CHANGE UP (ref 39–50)
HGB BLD-MCNC: 12.4 G/DL — LOW (ref 13–17)
HGB BLD-MCNC: 13.1 G/DL — SIGNIFICANT CHANGE UP (ref 13–17)
IMM GRANULOCYTES NFR BLD AUTO: 0.2 % — SIGNIFICANT CHANGE UP (ref 0–0.9)
LYMPHOCYTES # BLD AUTO: 1.42 K/UL — SIGNIFICANT CHANGE UP (ref 1–3.3)
LYMPHOCYTES # BLD AUTO: 15.6 % — SIGNIFICANT CHANGE UP (ref 13–44)
MCHC RBC-ENTMCNC: 19.4 PG — LOW (ref 27–34)
MCHC RBC-ENTMCNC: 19.7 PG — LOW (ref 27–34)
MCHC RBC-ENTMCNC: 29.5 G/DL — LOW (ref 32–36)
MCHC RBC-ENTMCNC: 30.4 G/DL — LOW (ref 32–36)
MCV RBC AUTO: 64.7 FL — LOW (ref 80–100)
MCV RBC AUTO: 65.9 FL — LOW (ref 80–100)
MONOCYTES # BLD AUTO: 1.32 K/UL — HIGH (ref 0–0.9)
MONOCYTES NFR BLD AUTO: 14.5 % — HIGH (ref 2–14)
NEUTROPHILS # BLD AUTO: 6.09 K/UL — SIGNIFICANT CHANGE UP (ref 1.8–7.4)
NEUTROPHILS NFR BLD AUTO: 66.8 % — SIGNIFICANT CHANGE UP (ref 43–77)
NRBC # BLD: 0 /100 WBCS — SIGNIFICANT CHANGE UP (ref 0–0)
NRBC # BLD: 0 /100 WBCS — SIGNIFICANT CHANGE UP (ref 0–0)
NT-PROBNP SERPL-SCNC: 2397 PG/ML — HIGH (ref 0–125)
PLATELET # BLD AUTO: 198 K/UL — SIGNIFICANT CHANGE UP (ref 150–400)
PLATELET # BLD AUTO: 215 K/UL — SIGNIFICANT CHANGE UP (ref 150–400)
POTASSIUM SERPL-MCNC: 4.1 MMOL/L — SIGNIFICANT CHANGE UP (ref 3.5–5.3)
POTASSIUM SERPL-SCNC: 4.1 MMOL/L — SIGNIFICANT CHANGE UP (ref 3.5–5.3)
PROT SERPL-MCNC: 7.1 GM/DL — SIGNIFICANT CHANGE UP (ref 6–8.3)
RBC # BLD: 6.31 M/UL — HIGH (ref 4.2–5.8)
RBC # BLD: 6.74 M/UL — HIGH (ref 4.2–5.8)
RBC # FLD: 17.8 % — HIGH (ref 10.3–14.5)
RBC # FLD: 18.6 % — HIGH (ref 10.3–14.5)
SODIUM SERPL-SCNC: 142 MMOL/L — SIGNIFICANT CHANGE UP (ref 135–145)
TSH SERPL-MCNC: 1.58 UIU/ML — SIGNIFICANT CHANGE UP (ref 0.36–3.74)
WBC # BLD: 9.12 K/UL — SIGNIFICANT CHANGE UP (ref 3.8–10.5)
WBC # BLD: 9.29 K/UL — SIGNIFICANT CHANGE UP (ref 3.8–10.5)
WBC # FLD AUTO: 9.12 K/UL — SIGNIFICANT CHANGE UP (ref 3.8–10.5)
WBC # FLD AUTO: 9.29 K/UL — SIGNIFICANT CHANGE UP (ref 3.8–10.5)

## 2024-10-31 PROCEDURE — 93970 EXTREMITY STUDY: CPT | Mod: 26

## 2024-10-31 PROCEDURE — 99223 1ST HOSP IP/OBS HIGH 75: CPT

## 2024-10-31 PROCEDURE — 93010 ELECTROCARDIOGRAM REPORT: CPT | Mod: 77

## 2024-10-31 PROCEDURE — 99232 SBSQ HOSP IP/OBS MODERATE 35: CPT

## 2024-10-31 PROCEDURE — 93010 ELECTROCARDIOGRAM REPORT: CPT

## 2024-10-31 RX ORDER — ASPIRIN/MAG CARB/ALUMINUM AMIN 325 MG
162 TABLET ORAL ONCE
Refills: 0 | Status: COMPLETED | OUTPATIENT
Start: 2024-10-31 | End: 2024-10-31

## 2024-10-31 RX ORDER — CHLORHEXIDINE GLUCONATE 40 MG/ML
1 SOLUTION TOPICAL
Refills: 0 | Status: DISCONTINUED | OUTPATIENT
Start: 2024-10-31 | End: 2024-11-14

## 2024-10-31 RX ORDER — ASPIRIN/MAG CARB/ALUMINUM AMIN 325 MG
1 TABLET ORAL
Refills: 0 | DISCHARGE

## 2024-10-31 RX ORDER — ALLOPURINOL 100 MG
300 TABLET ORAL DAILY
Refills: 0 | Status: DISCONTINUED | OUTPATIENT
Start: 2024-10-31 | End: 2024-11-14

## 2024-10-31 RX ORDER — LEVALBUTEROL HYDROCHLORIDE 0.63 MG/3ML
0.63 SOLUTION RESPIRATORY (INHALATION) ONCE
Refills: 0 | Status: COMPLETED | OUTPATIENT
Start: 2024-10-31 | End: 2024-10-31

## 2024-10-31 RX ORDER — MAGNESIUM, ALUMINUM HYDROXIDE 200-200 MG
30 TABLET,CHEWABLE ORAL EVERY 4 HOURS
Refills: 0 | Status: DISCONTINUED | OUTPATIENT
Start: 2024-10-31 | End: 2024-10-31

## 2024-10-31 RX ORDER — FUROSEMIDE 40 MG
15 TABLET ORAL
Qty: 500 | Refills: 0 | Status: DISCONTINUED | OUTPATIENT
Start: 2024-10-31 | End: 2024-11-02

## 2024-10-31 RX ORDER — ASPIRIN/MAG CARB/ALUMINUM AMIN 325 MG
81 TABLET ORAL DAILY
Refills: 0 | Status: DISCONTINUED | OUTPATIENT
Start: 2024-10-31 | End: 2024-11-13

## 2024-10-31 RX ORDER — DILTIAZEM HCL 5 MG/ML
15 VIAL (ML) INTRAVENOUS
Qty: 125 | Refills: 0 | Status: DISCONTINUED | OUTPATIENT
Start: 2024-10-31 | End: 2024-11-01

## 2024-10-31 RX ORDER — DILTIAZEM HCL 5 MG/ML
25 VIAL (ML) INTRAVENOUS ONCE
Refills: 0 | Status: COMPLETED | OUTPATIENT
Start: 2024-10-31 | End: 2024-10-31

## 2024-10-31 RX ORDER — ACETAMINOPHEN 500 MG
650 TABLET ORAL EVERY 6 HOURS
Refills: 0 | Status: DISCONTINUED | OUTPATIENT
Start: 2024-10-31 | End: 2024-11-14

## 2024-10-31 RX ORDER — DILTIAZEM HCL 5 MG/ML
15 VIAL (ML) INTRAVENOUS ONCE
Refills: 0 | Status: COMPLETED | OUTPATIENT
Start: 2024-10-31 | End: 2024-10-31

## 2024-10-31 RX ORDER — DILTIAZEM HCL 5 MG/ML
5 VIAL (ML) INTRAVENOUS
Qty: 125 | Refills: 0 | Status: DISCONTINUED | OUTPATIENT
Start: 2024-10-31 | End: 2024-10-31

## 2024-10-31 RX ORDER — ONDANSETRON HYDROCHLORIDE 2 MG/ML
4 INJECTION, SOLUTION INTRAMUSCULAR; INTRAVENOUS EVERY 8 HOURS
Refills: 0 | Status: DISCONTINUED | OUTPATIENT
Start: 2024-10-31 | End: 2024-11-14

## 2024-10-31 RX ORDER — METHYLPREDNISOLONE ACETATE 80 MG/ML
125 INJECTION, SUSPENSION INTRALESIONAL; INTRAMUSCULAR; INTRASYNOVIAL; SOFT TISSUE ONCE
Refills: 0 | Status: COMPLETED | OUTPATIENT
Start: 2024-10-31 | End: 2024-10-31

## 2024-10-31 RX ORDER — ROSUVASTATIN CALCIUM 10 MG
5 TABLET ORAL AT BEDTIME
Refills: 0 | Status: DISCONTINUED | OUTPATIENT
Start: 2024-10-31 | End: 2024-11-14

## 2024-10-31 RX ORDER — MELATONIN 5 MG
3 TABLET ORAL AT BEDTIME
Refills: 0 | Status: DISCONTINUED | OUTPATIENT
Start: 2024-10-31 | End: 2024-11-01

## 2024-10-31 RX ORDER — METOPROLOL TARTRATE 50 MG
100 TABLET ORAL DAILY
Refills: 0 | Status: DISCONTINUED | OUTPATIENT
Start: 2024-10-31 | End: 2024-11-01

## 2024-10-31 RX ORDER — SPIRONOLACTONE 100 MG
25 TABLET ORAL DAILY
Refills: 0 | Status: DISCONTINUED | OUTPATIENT
Start: 2024-10-31 | End: 2024-11-05

## 2024-10-31 RX ORDER — INFLUENZ VIR VAC TV P-SURF2003 15MCG/.5ML
0.5 SYRINGE (ML) INTRAMUSCULAR ONCE
Refills: 0 | Status: DISCONTINUED | OUTPATIENT
Start: 2024-10-31 | End: 2024-11-14

## 2024-10-31 RX ORDER — DILTIAZEM HCL 5 MG/ML
7.5 VIAL (ML) INTRAVENOUS
Qty: 125 | Refills: 0 | Status: DISCONTINUED | OUTPATIENT
Start: 2024-10-31 | End: 2024-10-31

## 2024-10-31 RX ORDER — FUROSEMIDE 40 MG
40 TABLET ORAL
Refills: 0 | Status: DISCONTINUED | OUTPATIENT
Start: 2024-10-31 | End: 2024-10-31

## 2024-10-31 RX ADMIN — Medication 25 MILLIGRAM(S): at 21:21

## 2024-10-31 RX ADMIN — Medication 300 MILLIGRAM(S): at 11:03

## 2024-10-31 RX ADMIN — Medication 40 MILLIGRAM(S): at 13:13

## 2024-10-31 RX ADMIN — HEPARIN SODIUM 2400 UNIT(S)/HR: 10000 INJECTION INTRAVENOUS; SUBCUTANEOUS at 07:29

## 2024-10-31 RX ADMIN — Medication 5 MILLIGRAM(S): at 21:21

## 2024-10-31 RX ADMIN — HEPARIN SODIUM 0 UNIT(S)/HR: 10000 INJECTION INTRAVENOUS; SUBCUTANEOUS at 19:02

## 2024-10-31 RX ADMIN — HEPARIN SODIUM 2400 UNIT(S)/HR: 10000 INJECTION INTRAVENOUS; SUBCUTANEOUS at 10:07

## 2024-10-31 RX ADMIN — HEPARIN SODIUM 2000 UNIT(S)/HR: 10000 INJECTION INTRAVENOUS; SUBCUTANEOUS at 11:17

## 2024-10-31 RX ADMIN — Medication 5 MG/HR: at 20:12

## 2024-10-31 RX ADMIN — Medication 5 MG/HR: at 01:48

## 2024-10-31 RX ADMIN — Medication 7.5 MG/HR: at 22:16

## 2024-10-31 RX ADMIN — LEVALBUTEROL HYDROCHLORIDE 0.63 MILLIGRAM(S): 0.63 SOLUTION RESPIRATORY (INHALATION) at 21:40

## 2024-10-31 RX ADMIN — Medication 5 MG/HR: at 19:52

## 2024-10-31 RX ADMIN — Medication 162 MILLIGRAM(S): at 00:55

## 2024-10-31 RX ADMIN — HEPARIN SODIUM 0 UNIT(S)/HR: 10000 INJECTION INTRAVENOUS; SUBCUTANEOUS at 10:14

## 2024-10-31 RX ADMIN — HEPARIN SODIUM 1600 UNIT(S)/HR: 10000 INJECTION INTRAVENOUS; SUBCUTANEOUS at 22:08

## 2024-10-31 RX ADMIN — Medication 25 MILLIGRAM(S): at 00:23

## 2024-10-31 RX ADMIN — Medication 40 MILLIGRAM(S): at 06:29

## 2024-10-31 RX ADMIN — HEPARIN SODIUM 1600 UNIT(S)/HR: 10000 INJECTION INTRAVENOUS; SUBCUTANEOUS at 20:11

## 2024-10-31 RX ADMIN — Medication 81 MILLIGRAM(S): at 11:03

## 2024-10-31 RX ADMIN — Medication 5 MG/HR: at 17:22

## 2024-10-31 RX ADMIN — Medication 5 MG/HR: at 07:34

## 2024-10-31 NOTE — PROVIDER CONTACT NOTE (EICU) - BACKGROUND
labs reviewed  < from: CT Angio Chest PE Protocol w/ IV Cont (10.30.24 @ 23:13) >    Left upper lobe peripheral airspace consolidation    < end of copied text >

## 2024-10-31 NOTE — PROGRESS NOTE ADULT - SUBJECTIVE AND OBJECTIVE BOX
Patient is a 68y old  Male who presents with a chief complaint of New onset Afib & CHF (31 Oct 2024 12:02)    INTERVAL HPI/OVERNIGHT EVENTS: No acute events overnight. Patient endorses dyspnea.     MEDICATIONS  (STANDING):  allopurinol 300 milliGRAM(s) Oral daily  aspirin  chewable 81 milliGRAM(s) Oral daily  diltiazem Infusion 5 mG/Hr (5 mL/Hr) IV Continuous <Continuous>  furosemide   Injectable 40 milliGRAM(s) IV Push two times a day  heparin  Infusion.  Unit(s)/Hr (24 mL/Hr) IV Continuous <Continuous>  influenza  Vaccine (HIGH DOSE) 0.5 milliLiter(s) IntraMuscular once  metoprolol succinate  milliGRAM(s) Oral daily  rosuvastatin 5 milliGRAM(s) Oral at bedtime    MEDICATIONS  (PRN):  acetaminophen     Tablet .. 650 milliGRAM(s) Oral every 6 hours PRN Temp greater or equal to 38C (100.4F), Mild Pain (1 - 3)  aluminum hydroxide/magnesium hydroxide/simethicone Suspension 30 milliLiter(s) Oral every 4 hours PRN Dyspepsia  heparin   Injectable 97837 Unit(s) IV Push every 6 hours PRN For aPTT less than 40  heparin   Injectable 5000 Unit(s) IV Push every 6 hours PRN For aPTT between 40 - 57  melatonin 3 milliGRAM(s) Oral at bedtime PRN Insomnia  ondansetron Injectable 4 milliGRAM(s) IV Push every 8 hours PRN Nausea and/or Vomiting      Allergies    niacin (Other)    Intolerances        REVIEW OF SYSTEMS: all negative with exception of above    Vital Signs Last 24 Hrs  T(C): 36.7 (31 Oct 2024 16:12), Max: 36.9 (30 Oct 2024 23:42)  T(F): 98 (31 Oct 2024 16:12), Max: 98.4 (30 Oct 2024 23:42)  HR: 125 (31 Oct 2024 16:12) (110 - 140)  BP: 123/76 (31 Oct 2024 16:12) (100/71 - 123/95)  BP(mean): --  RR: 20 (31 Oct 2024 16:12) (20 - 26)  SpO2: 92% (31 Oct 2024 16:12) (88% - 97%)    Parameters below as of 31 Oct 2024 16:12  Patient On (Oxygen Delivery Method): nasal cannula  O2 Flow (L/min): 3      PHYSICAL EXAM:  GENERAL: Obese. NAD   LUNG: Clear to auscultation bilaterally; No wheezes, rales or rhonchi. No increase work of breathing, Speaks in full sentences.   HEART: Irregularly irregular. No murmurs, rubs, or gallops  ABDOMEN: +BS, Soft, Nontender, Distended. No guarding. No Fluid shift.   EXTREMITIES:  No clubbing, cyanosis. b/l LE edema  PSYCH: normal mood and affect  NEUROLOGY: AAOx3, non-focal  SKIN: No rashes or lesions. RLE - atopic dermatitis with open wound    LABS:                        13.1   9.29  )-----------( 215      ( 31 Oct 2024 08:45 )             44.4     10-31    142  |  105  |  22  ----------------------------<  100[H]  4.1   |  29  |  1.08    Ca    8.8      31 Oct 2024 08:45  Mg     1.9     10-30    TPro  7.1  /  Alb  3.6  /  TBili  1.9[H]  /  DBili  x   /  AST  36  /  ALT  30  /  AlkPhos  58  10-31    PT/INR - ( 30 Oct 2024 20:49 )   PT: 15.7 sec;   INR: 1.39 ratio         PTT - ( 31 Oct 2024 08:45 )  PTT:>200.0 sec  Urinalysis Basic - ( 31 Oct 2024 08:45 )    Color: x / Appearance: x / SG: x / pH: x  Gluc: 100 mg/dL / Ketone: x  / Bili: x / Urobili: x   Blood: x / Protein: x / Nitrite: x   Leuk Esterase: x / RBC: x / WBC x   Sq Epi: x / Non Sq Epi: x / Bacteria: x      CAPILLARY BLOOD GLUCOSE          RADIOLOGY & ADDITIONAL TESTS:    Imaging Personally Reviewed:  [ ] YES  [ ] NO    Consultant(s) Notes Reviewed:  [ ] YES  [ ] NO    Care Discussed with Consultants/Other Providers [ ] YES  [ ] NO

## 2024-10-31 NOTE — H&P ADULT - NSHPPHYSICALEXAM_GEN_ALL_CORE
GENERAL: Obese. NAD   HEAD:  Atraumatic, Normocephalic  EYES: EOMI, PERRL, conjunctiva and sclera clear  NECK: Supple, No JVD  LUNG: Clear to auscultation bilaterally; No wheezes, rales or rhonchi. No increase work of breathing, Speaks in full sentences.   HEART: Irregularly irregular. No murmurs, rubs, or gallops  ABDOMEN: +BS, Soft, Nontender, Distended. No guarding. No Fluid shift.   EXTREMITIES:  No clubbing, cyanosis. b/l LE edema  PSYCH: normal mood and affect  NEUROLOGY: AAOx3, non-focal  SKIN: No rashes or lesions. RLE - atopic dermatitis with open wound

## 2024-10-31 NOTE — H&P ADULT - HISTORY OF PRESENT ILLNESS
68-year-old obese male with a PMH of HTN, HLD, PVD, gout sent to the ED from PCP’s office to be evaluated for new onset Afib. Endorses 2 weeks history of TRINH, orthopnea, PND, associated with b/l LE edema and increase abdominal size. Upon evaluation, patient became SOB by just moving in the bed, requires 3L-NC. Denies chest pain, palpitation, headache, dizziness, vision changes, fever, chills, abd pain, N/V/D or nay other acute symptoms. Endorses he has changed his diet drastically since recognized increasing the abdomen size. Upon ED arrival patient was found to be in rapid Afib with HR in 150s. Initially received Metoprolol 5mg IVP x3 with minimal improvement, then cardiology consulted, received Cardizem 25mg IV, then placed in Cardizem gtt, Heparin gtt and Lasix 80mg. Labs remarkable for D-Dimer:224, BNP:2339. Normotensive. Afebrile.   CT-Chest: No evidence of main/proximal segmental pulmonary embolus. Left upper lobe peripheral airspace consolidation and 1.2 cm nodular airspace consolidation in the left lower lobe. Small right pleural effusion.

## 2024-10-31 NOTE — CONSULT NOTE ADULT - ASSESSMENT
Morbid obesity.  AF with RVR.  Decompensated HF with fluid OL.    -Echo ASAP.  -Start Lasix gtt at 10 mg/hr. Start spironolactone 25 mg qd.  -Continue Cardizem gtt for now, though will change if pt has significant LV dysfunction.  -Will need eval for SHILOH.

## 2024-10-31 NOTE — PATIENT PROFILE ADULT - FALL HARM RISK - HARM RISK INTERVENTIONS

## 2024-10-31 NOTE — CONSULT NOTE ADULT - SUBJECTIVE AND OBJECTIVE BOX
67 yo m pmhx obesity, HTN on metoprolol XL and amlodipine, HLD, PVD and gout sent from PCP office with new onset fili.  Per patient over the past several weeks he has increasing swelling in b/l LE, increasing size of abdomen, TRINH and orthopnea.  Yesterday he went to see his PCP who found fili on EKG prompting his presentation this evening.  Labs significant for BNP ~2300, otherwise grossly unremarkable.  Patient started on heparin gtt for ac, given Lasix 80mg IVP for diuresis, metoprolol 5mg IVP x3 with minimal improvement in rate control.  ED attending spoke to on call Cardiologist who recommended IVP Cardizem if EF appeared overall preserved on bedside tte.  Dr. Greenberg performed bedside pocus, deemed LVEF to appear grossly wnl and gave patient Cardizem 25mg IVP x1.  HR responded well, HR now better controlled, low 100s to 110s. Patient beginning to diurese as well.  Patient seen at bedside, helped back in to bed, partially winded secondary to taking his NC off to urinate while sitting on edge of bed and then subsequently being helped back into to bed.  NC placed back in place, increased NC from 3 to 5L with improvement in sob/spo2. Patient able to speak in full sentences.  Patient denies, any chest pain, fever, chills, palpitations, dizziness, endorsing normal urination, grossly no change in BM, endorses he cut back on his diet and alcohol intake secondary to increase in size of his abdomen.       PAST MEDICAL & SURGICAL HISTORY:  Hypertension  Gout  Hypercholesteremia  Morbid obesity  H/O colonoscopy      Allergies  niacin (Other)      FAMILY HISTORY:  Family history of pacemaker (Mother)  FH: diabetes mellitus (Father)      Social History:   From home, lives with wife, employed, +etoh use      Review of Systems:  "I feel okay"      Vitals During Exam:   HR: 100s  BP: 120s/60s  RR: 21  sPO2: 94% on 5L     Physical Examination:    General: April obese adult male, sitting in bed, nad     HEENT: NC/AT, NC in place    PULM: diminished b/l    CVS: af    ABD: large, Soft, nondistended, nontender, +bs    EXT: 2+ pitting edema, nontender, RLE with ulcerations    SKIN: Warm     NEURO: Alert, oriented, interactive      Medications:  diltiazem Infusion 5 mG/Hr IV Continuous <Continuous>  heparin   Injectable 56920 Unit(s) IV Push every 6 hours PRN  heparin   Injectable 5000 Unit(s) IV Push every 6 hours PRN  heparin  Infusion.  Unit(s)/Hr IV Continuous <Continuous>      ICU Vital Signs Last 24 Hrs  T(C): 36.9 (30 Oct 2024 23:42), Max: 36.9 (30 Oct 2024 23:42)  T(F): 98.4 (30 Oct 2024 23:42), Max: 98.4 (30 Oct 2024 23:42)  HR: 110 (31 Oct 2024 00:40) (110 - 140)  BP: 119/93 (31 Oct 2024 00:40) (111/90 - 123/95)  BP(mean): --  ABP: --  ABP(mean): --  RR: 24 (31 Oct 2024 00:40) (20 - 26)  SpO2: 91% (31 Oct 2024 00:40) (88% - 97%)    O2 Parameters below as of 31 Oct 2024 00:40  Patient On (Oxygen Delivery Method): nasal cannula  O2 Flow (L/min): 6      Vital Signs Last 24 Hrs  T(C): 36.9 (30 Oct 2024 23:42), Max: 36.9 (30 Oct 2024 23:42)  T(F): 98.4 (30 Oct 2024 23:42), Max: 98.4 (30 Oct 2024 23:42)  HR: 110 (31 Oct 2024 00:40) (110 - 140)  BP: 119/93 (31 Oct 2024 00:40) (111/90 - 123/95)  BP(mean): --  RR: 24 (31 Oct 2024 00:40) (20 - 26)  SpO2: 91% (31 Oct 2024 00:40) (88% - 97%)    Parameters below as of 31 Oct 2024 00:40  Patient On (Oxygen Delivery Method): nasal cannula  O2 Flow (L/min): 6      LABS:                        13.6   9.49  )-----------( 212      ( 30 Oct 2024 20:49 )             44.8     10-30    142  |  109[H]  |  22  ----------------------------<  118[H]  4.7   |  25  |  1.05    Ca    8.9      30 Oct 2024 21:40  Mg     1.9     10-30    TPro  7.2  /  Alb  3.8  /  TBili  1.9[H]  /  DBili  x   /  AST  33  /  ALT  29  /  AlkPhos  60  10-30        PT/INR - ( 30 Oct 2024 20:49 )   PT: 15.7 sec;   INR: 1.39 ratio    PTT - ( 30 Oct 2024 20:49 )  PTT:39.5 sec      Urinalysis Basic - ( 30 Oct 2024 21:40 )  Color: x / Appearance: x / SG: x / pH: x  Gluc: 118 mg/dL / Ketone: x  / Bili: x / Urobili: x   Blood: x / Protein: x / Nitrite: x   Leuk Esterase: x / RBC: x / WBC x   Sq Epi: x / Non Sq Epi: x / Bacteria: x      RADIOLOGY:   < from: CT Angio Chest PE Protocol w/ IV Cont (10.30.24 @ 23:13) >    ACC: [id number] EXAM:  CT ANGIO CHEST PULM ART WAWIC   ORDERED BY: RAVEN COTE     PROCEDURE DATE:  10/30/2024      INTERPRETATION:  CLINICAL INFORMATION: Hypoxia, leg swelling, evaluate   for pulmonary embolism    COMPARISON: None.    CONTRAST/COMPLICATIONS:  IV Contrast: Omnipaque 350  80 cc administered   20 cc discarded  Oral Contrast: NONE  Complications: None reported at time of study completion    PROCEDURE:  CT Angiography of the Chest.  Sagittal and coronal reformats were performedas well as 3D (MIP)   reconstructions.    FINDINGS:  Exam mildly limited due to photon starvation from patient body habitus   and suboptimal contrast bolus timing. Within this constraint:    LUNGS AND LARGE AIRWAYS: Patent central airways. Approximately 2.8 cm   peripheral airspace consolidation in the left upper lobe. Right lower   lobe atelectasis. Mosaic perfusion throughout the lungs suggestive of air   trapping. 1.2 cm nodular airspace consolidation in the left lower lobe.  PLEURA: Small right pleural effusion.  VESSELS: Aortic calcifications. Coronary artery calcifications.   Respiratory motion and suboptimal contrast bolus timing limits evaluation   of the distal pulmonary vasculature. Evaluation of the distal segmental   and subsegmental pulmonary arteries is nondiagnostic. Within this   constraint: No evidence of main/proximal segmental pulmonary embolus.  HEART: Cardiomegaly. No pericardial effusion.  MEDIASTINUM AND KANDI: No lymphadenopathy.  CHEST WALL AND LOWER NECK: Within normal limits.  VISUALIZED UPPER ABDOMEN: Eventration of the diaphragm on the right.   Within normal limits.  BONES: Degenerative changes.    IMPRESSION:  Exam limited due to combination of respiratory motion artifact,   suboptimal contrast bolus timing and photon starvation. Within this   constraint:    No evidence of main/proximal segmental pulmonary embolus.    Left upper lobe peripheral airspace consolidation and 1.2 cm nodular   airspace consolidation in the left lower lobe. Differential diagnosis   includes pulmonary infarct, infection, inflammation or less likely   masslike consolidation. Follow-up to resolution recommended.    Small right pleural effusion.    --- End of Report ---    JOVANI GALO MD  This document has been electronically signed. Oct 30 2024 11:38PM    < end of copied text >        SUPPLEMENTAL O2: 3-5L NC (5L on exertion)  LINES: PIV  IVF: N  BURCH: voids  PPx: heparin gtt  CONTACT: N

## 2024-10-31 NOTE — H&P ADULT - PROBLEM SELECTOR PLAN 2
2 weeks of dyspnea, orthopnea, PND & LE edema   BNP: 2339  - Tele  - Strict I & O   - Daily Weight   - Lasix   - Echo   - Cardi consult

## 2024-10-31 NOTE — H&P ADULT - NSHPLABSRESULTS_GEN_ALL_CORE
13.6   9.49  )-----------( 212      ( 30 Oct 2024 20:49 )             44.8     142  |  109[H]  |  22  ----------------------------<  118[H]     10-30  4.7   |  25  |  1.05    Ca    8.9      30 Oct 2024 21:40  Mg     1.9     10-30    TPro  7.2  /  Alb  3.8  /  TBili  1.9[H]  /  DBili  x   /  AST  33  /  ALT  29  /  AlkPhos  60  10-30    PT/INR: 15.7/1.39 (10-30-24 @ 20:49)  PTT: 39.5 (10-30-24 @ 20:49)    Pro-BNP: 2339 (10-30-24 @ 20:49)        Exam mildly limited due to photon starvation from patient body habitus and suboptimal contrast bolus timing. Within this constraint:    LUNGS AND LARGE AIRWAYS: Patent central airways. Approximately 2.8 cm peripheral airspace consolidation in the left upper lobe. Right lower lobe atelectasis. Mosaic perfusion throughout the lungs suggestive of air trapping. 1.2 cm nodular airspace consolidation in the left lower lobe.  PLEURA: Small right pleural effusion.  VESSELS: Aortic calcifications. Coronary artery calcifications. Respiratory motion and suboptimal contrast bolus timing limits evaluation of the distal pulmonary vasculature. Evaluation of the distal segmental and subsegmental pulmonary arteries is nondiagnostic. Within this constraint: No evidence of main/proximal segmental pulmonary embolus.  HEART: Cardiomegaly. No pericardial effusion.  MEDIASTINUM AND KANDI: No lymphadenopathy.  CHEST WALL AND LOWER NECK: Within normal limits.  VISUALIZED UPPER ABDOMEN: Eventration of the diaphragm on the right. Within normal limits.  BONES: Degenerative changes.    IMPRESSION:  Exam limited due to combination of respiratory motion artifact, suboptimal contrast bolus timing and photon starvation. Within this constraint:    No evidence of main/proximal segmental pulmonary embolus.    Left upper lobe peripheral airspace consolidation and 1.2 cm nodular airspace consolidation in the left lower lobe. Differential diagnosis includes pulmonary infarct, infection, inflammation or less likely masslike consolidation. Follow-up to resolution recommended.    Small right pleural effusion.

## 2024-10-31 NOTE — PROVIDER CONTACT NOTE (OTHER) - ACTION/TREATMENT ORDERED:
Lasix drip rate change at 10ml/hr Dr. Huff came and evaluated patient at bedside, started Hiflow nasal cannula @10ml/hr, Xopenex nebulizer treatment given by RT, Cardizem drip increased @7.5ml/hr

## 2024-10-31 NOTE — CONSULT NOTE ADULT - SUBJECTIVE AND OBJECTIVE BOX
68-year-old obese male with a PMH of HTN, HLD, PVD, gout sent to the ED from PCP’s office to be evaluated for new onset Afib. Endorses 2 weeks history of TRINH, orthopnea, PND, associated with b/l LE edema and increase abdominal size. Upon evaluation, patient became SOB by just moving in the bed, requires 3L-NC. Denies chest pain, palpitation, headache, dizziness, vision changes, fever, chills, abd pain, N/V/D or nay other acute symptoms. Endorses he has changed his diet drastically since recognized increasing the abdomen size. Upon ED arrival patient was found to be in rapid Afib with HR in 150s. Initially received Metoprolol 5mg IVP x3 with minimal improvement, then cardiology consulted, received Cardizem 25mg IV, then placed in Cardizem gtt, Heparin gtt and Lasix 80mg. Labs remarkable for D-Dimer:224, BNP:2339. Normotensive. Afebrile.   CT-Chest: No evidence of main/proximal segmental pulmonary embolus. Left upper lobe peripheral airspace consolidation and 1.2 cm nodular airspace consolidation in the left lower lobe. Small right pleural effusion.    Allergies    niacin (Other)    Intolerances    	    MEDICATIONS:  aspirin  chewable 81 milliGRAM(s) Oral daily  diltiazem Infusion 5 mG/Hr IV Continuous <Continuous>  furosemide   Injectable 40 milliGRAM(s) IV Push two times a day  heparin   Injectable 73453 Unit(s) IV Push every 6 hours PRN  heparin   Injectable 5000 Unit(s) IV Push every 6 hours PRN  heparin  Infusion.  Unit(s)/Hr IV Continuous <Continuous>  metoprolol succinate  milliGRAM(s) Oral daily        acetaminophen     Tablet .. 650 milliGRAM(s) Oral every 6 hours PRN  melatonin 3 milliGRAM(s) Oral at bedtime PRN  ondansetron Injectable 4 milliGRAM(s) IV Push every 8 hours PRN    aluminum hydroxide/magnesium hydroxide/simethicone Suspension 30 milliLiter(s) Oral every 4 hours PRN    allopurinol 300 milliGRAM(s) Oral daily  rosuvastatin 5 milliGRAM(s) Oral at bedtime    influenza  Vaccine (HIGH DOSE) 0.5 milliLiter(s) IntraMuscular once      PAST MEDICAL & SURGICAL HISTORY:  Hypertension      Gout      Hypercholesteremia      Morbid obesity      H/O colonoscopy      PHYSICAL EXAM:  T(C): 36.6 (10-31-24 @ 07:20), Max: 36.9 (10-30-24 @ 23:42)  HR: 117 (10-31-24 @ 07:20) (110 - 140)  BP: 110/96 (10-31-24 @ 07:20) (100/71 - 123/95)  RR: 20 (10-31-24 @ 07:20) (20 - 26)  SpO2: 96% (10-31-24 @ 07:20) (88% - 97%)  Wt(kg): --  I&O's Summary      Appearance: Normal	  HEENT:   Normal oral mucosa, PERRL, EOMI	  Lymphatic: No lymphadenopathy  Cardiovascular: Normal S1 S2, No JVD, No murmurs, No edema  Respiratory: Lungs clear to auscultation	  Psychiatry: A & O x 3, Mood & affect appropriate  Gastrointestinal:  Soft, Non-tender, + BS	  Skin: No rashes, No ecchymoses, No cyanosis	  Neurologic: Non-focal  Extremities: Normal range of motion, No clubbing, cyanosis or edema  Vascular: Peripheral pulses palpable 2+ bilaterally        LABS:	 	    CBC Full  -  ( 31 Oct 2024 08:45 )  WBC Count : 9.29 K/uL  Hemoglobin : 13.1 g/dL  Hematocrit : 44.4 %  Platelet Count - Automated : 215 K/uL  Mean Cell Volume : 65.9 fl  Mean Cell Hemoglobin : 19.4 pg  Mean Cell Hemoglobin Concentration : 29.5 g/dL  Auto Neutrophil # : x  Auto Lymphocyte # : x  Auto Monocyte # : x  Auto Eosinophil # : x  Auto Basophil # : x  Auto Neutrophil % : x  Auto Lymphocyte % : x  Auto Monocyte % : x  Auto Eosinophil % : x  Auto Basophil % : x    10-31    142  |  105  |  22  ----------------------------<  100[H]  4.1   |  29  |  1.08  10-30    142  |  109[H]  |  22  ----------------------------<  118[H]  4.7   |  25  |  1.05    Ca    8.8      31 Oct 2024 08:45  Ca    8.9      30 Oct 2024 21:40  Mg     1.9     10-30    TPro  7.1  /  Alb  3.6  /  TBili  1.9[H]  /  DBili  x   /  AST  36  /  ALT  30  /  AlkPhos  58  10-31  TPro  7.2  /  Alb  3.8  /  TBili  1.9[H]  /  DBili  x   /  AST  33  /  ALT  29  /  AlkPhos  60  10-30 68M HTN, ?PVD, gout, p/w AF with RVR. Endorses 2 weeks history of TRINH, orthopnea, PND, associated with b/l LE edema and increased abdominal girth. Initially received Metoprolol 5mg IVP x3 with minimal improvement, then put on Cardizem gtt. On heparin gtt, and received IV Lasix. NTproBNP 2339.    CT-Chest: No evidence of main/proximal segmental pulmonary embolus. Left upper lobe peripheral airspace consolidation and 1.2 cm nodular airspace consolidation in the left lower lobe. Small right pleural effusion.    ECG: AF;  anterior infarct, age undetermined  Tele: AF w RVR    Allergies    niacin (Other)    	    MEDICATIONS:  aspirin  chewable 81 milliGRAM(s) Oral daily  diltiazem Infusion 5 mG/Hr IV Continuous <Continuous>  furosemide   Injectable 40 milliGRAM(s) IV Push two times a day  heparin   Injectable 73586 Unit(s) IV Push every 6 hours PRN  heparin   Injectable 5000 Unit(s) IV Push every 6 hours PRN  heparin  Infusion.  Unit(s)/Hr IV Continuous <Continuous>  metoprolol succinate  milliGRAM(s) Oral daily      acetaminophen     Tablet .. 650 milliGRAM(s) Oral every 6 hours PRN  melatonin 3 milliGRAM(s) Oral at bedtime PRN  ondansetron Injectable 4 milliGRAM(s) IV Push every 8 hours PRN    aluminum hydroxide/magnesium hydroxide/simethicone Suspension 30 milliLiter(s) Oral every 4 hours PRN    allopurinol 300 milliGRAM(s) Oral daily  rosuvastatin 5 milliGRAM(s) Oral at bedtime    influenza  Vaccine (HIGH DOSE) 0.5 milliLiter(s) IntraMuscular once      PAST MEDICAL & SURGICAL HISTORY:  Hypertension      Gout      Hypercholesteremia      Morbid obesity      H/O colonoscopy      PHYSICAL EXAM:  T(C): 36.6 (10-31-24 @ 07:20), Max: 36.9 (10-30-24 @ 23:42)  HR: 117 (10-31-24 @ 07:20) (110 - 140)  BP: 110/96 (10-31-24 @ 07:20) (100/71 - 123/95)  RR: 20 (10-31-24 @ 07:20) (20 - 26)  SpO2: 96% (10-31-24 @ 07:20) (88% - 97%)  Wt(kg): --  I&O's Summary    Appearance: obese, NAD	  Cardiovascular: Irreg irreg, likely (+) JVD, 3+ LE edema  Respiratory: Lungs clear to auscultation	  Psychiatry: A & O x 3, Mood & affect appropriate  Gastrointestinal:  obese, non-tender, distended    LABS:	 	    CBC Full  -  ( 31 Oct 2024 08:45 )  WBC Count : 9.29 K/uL  Hemoglobin : 13.1 g/dL  Hematocrit : 44.4 %  Platelet Count - Automated : 215 K/uL  Mean Cell Volume : 65.9 fl  Mean Cell Hemoglobin : 19.4 pg  Mean Cell Hemoglobin Concentration : 29.5 g/dL  Auto Neutrophil # : x  Auto Lymphocyte # : x  Auto Monocyte # : x  Auto Eosinophil # : x  Auto Basophil # : x  Auto Neutrophil % : x  Auto Lymphocyte % : x  Auto Monocyte % : x  Auto Eosinophil % : x  Auto Basophil % : x    10-31    142  |  105  |  22  ----------------------------<  100[H]  4.1   |  29  |  1.08  10-30    142  |  109[H]  |  22  ----------------------------<  118[H]  4.7   |  25  |  1.05    Ca    8.8      31 Oct 2024 08:45  Ca    8.9      30 Oct 2024 21:40  Mg     1.9     10-30    TPro  7.1  /  Alb  3.6  /  TBili  1.9[H]  /  DBili  x   /  AST  36  /  ALT  30  /  AlkPhos  58  10-31  TPro  7.2  /  Alb  3.8  /  TBili  1.9[H]  /  DBili  x   /  AST  33  /  ALT  29  /  AlkPhos  60  10-30

## 2024-10-31 NOTE — CONSULT NOTE ADULT - ASSESSMENT
67 yo m pmhx obesity, HTN on metoprolol XL and amlodipine, HLD, PVD and gout admitted with     1. New onset JOVAN  2. New CHF, likely diastolic   3. Acute hypoxic respiratory failure  4. LE edema with ulcerations    RECOMMENDATIONS:  -recommend restarting home dose metoprolol  -recommend fixed dose cardizem gtt  -can consider PO cardizem  -On call cardiologist spoken to by ED attending.  Recommending f/u for official cards consult in am  -tte  -recommend diuresis as tolerated  -recommend spo2 as needed  -recommend strict I&Os  -continue heparin gtt  -recommend wound care consult for RLE ulcerations  -recommend telemtery  -would limit exertion at this time    Patient seen and examined at bedside, vitals/chart/meds reviewed.  Case discussed with eICU attending Dr. Charles.  At this time patient does not require ICU level of care, recs as above, please reconsult as needed.      DATE OF ENTRY OF THIS NOTE IS EQUAL TO THE DATE OF SERVICES RENDERED

## 2024-10-31 NOTE — RAPID RESPONSE TEAM SUMMARY - NSSITUATIONBACKGROUNDRRT_GEN_ALL_CORE
68-year-old obese male with a PMH of HTN, HLD, PVD, gout sent to the ED from PCP’s office to be evaluated for new onset Afib. Endorses 2 weeks history of TRINH, orthopnea, PND, associated with b/l LE edema and increase abdominal size. Upon evaluation, patient became SOB by just moving in the bed, requires 3L-NC. Denies chest pain, palpitation, headache, dizziness, vision changes, fever, chills, abd pain, N/V/D or nay other acute symptoms. Endorses he has changed his diet drastically since recognized increasing the abdomen size. Upon ED arrival patient was found to be in rapid Afib with HR in 150s. Initially received Metoprolol 5mg IVP x3 with minimal improvement, then cardiology consulted, received Cardizem 25mg IV, then placed in Cardizem gtt, Heparin gtt and Lasix 80mg. Labs remarkable for D-Dimer:224, BNP:2339.  CT-Chest: No evidence of main/proximal segmental pulmonary embolus. Left upper lobe peripheral airspace consolidation and 1.2 cm nodular airspace consolidation in the left lower lobe. Small right pleural effusion.   Problem/Plan - 1:  ·  Problem: New onset atrial fibrillation.   ·  Plan: Found to be in rapid Afib with HR in the 150s

## 2024-10-31 NOTE — PROCEDURE NOTE - ADDITIONAL PROCEDURE DETAILS
PIV placed in setting of acute hypoxic respiratory failure, chf exacerbation, af rvr  Not included in cc time  tourniquet applied, vessel identified under ultrasound guidance, site cleaned/draped, IV placed, brisk blood return/flushes well, site dressed and secured with tegaderm/tape.

## 2024-10-31 NOTE — PROVIDER CONTACT NOTE (OTHER) - REASON
U0swd38% on O2 @6L/min, noted dyspnea on exertion, 's-150's at rest [id number]% on O2 @6L/min via , noted dyspnea on exertion, 's-150's at rest

## 2024-10-31 NOTE — PROVIDER CONTACT NOTE (EICU) - SITUATION
67 yo m pmhx obesity, HTN on metoprolol XL and amlodipine, HLD, PVD and gout sent from PCP office with new onset fili.  Per patient over the past several weeks he has increasing swelling in b/l LE, increasing size of abdomen, TRINH and orthopnea.  Yesterday he went to see his PCP who found fili on EKG prompting his presentation this evening.  Labs significant for BNP ~2300, otherwise grossly unremarkable.  Patient started on heparin gtt for ac, given Lasix 80mg IVP for diuresis, metoprolol 5mg IVP x3 with minimal improvement in rate control.  ED attending spoke to on call Cardiologist who recommended IVP Cardizem if EF appeared overall preserved on bedside tte.  Dr. Greenberg performed bedside pocus, deemed LVEF to appear grossly wnl and gave patient Cardizem 25mg IVP x1.  HR responded well, HR now better controlled, low 100s to 110s. Patient beginning to diurese as well.  Patient seen at bedside, helped back in to bed, partially winded secondary to taking his NC off to urinate while sitting on edge of bed and then subsequently being helped back into to bed.  NC placed back in place, increased NC from 3 to 5L with improvement in sob/spo2.  Case discussed with the ICU PA.

## 2024-10-31 NOTE — RAPID RESPONSE TEAM SUMMARY - NSOTHERINTERVENTIONSRRT_GEN_ALL_CORE
ICU consulted  New IV placed ICU consulted  New IV placed    Greater than 35 minutes spent on reviewing pt's chart, reviewing Labs, diagnostic imaging and making informed decision about pt's treatment based on his symptoms.

## 2024-10-31 NOTE — PATIENT PROFILE ADULT - NSPROHMSYMPCOND_GEN_A_NUR
Patient: Sohan De La Torre    Procedure Summary       Date: 01/23/24 Room / Location: [facility] OR  INV /  [facility] OR    Anesthesia Start: 1439 Anesthesia Stop: 1626    Procedure: EKOS CATHETER PLACEMENT Diagnosis:     Surgeons: Sohan Burger MD Provider: Tani Li MD    Anesthesia Type: MAC ASA Status: 4            Anesthesia Type: MAC    Vitals  Vitals Value Taken Time   /62 01/23/24 1730   Temp 36.6 °C (97.8 °F) 01/23/24 1625   Pulse 68 01/23/24 1743   Resp     SpO2 99 % 01/23/24 1743   Vitals shown include unfiled device data.        Post Anesthesia Care and Evaluation    Patient location: did not personally evaluate patient.    Comments: Discharge criteria met per RN    
none

## 2024-10-31 NOTE — PROVIDER CONTACT NOTE (OTHER) - SITUATION
Patient noted with S7kgx72% on O2 @6L/min, noted dyspnea on exertion, fluctuating HR between 130's-150's at rest with ongoing Cardizem drip @5ml/hr. Patient noted with O7ylm28% on O2 @6L/min via nasal cannula, noted dyspnea on exertion, fluctuating HR ranging between 130's-150's at rest with ongoing Cardizem drip @5ml/hr.

## 2024-10-31 NOTE — H&P ADULT - PROBLEM SELECTOR PLAN 1
Found to be in rapid Afib with HR in the 150s   Received Metoprolol 5mg IVP x3 & Cardizem IV 25mg IV   - Tele   - ASA   - Cardizem gtt  - Heparin gtt   - ECG   - Echo   - Cardio Consult

## 2024-11-01 ENCOUNTER — RESULT REVIEW (OUTPATIENT)
Age: 69
End: 2024-11-01

## 2024-11-01 LAB
A1C WITH ESTIMATED AVERAGE GLUCOSE RESULT: 5.9 % — HIGH (ref 4–5.6)
ALBUMIN SERPL ELPH-MCNC: 3.2 G/DL — LOW (ref 3.3–5)
ALBUMIN SERPL ELPH-MCNC: 3.5 G/DL — SIGNIFICANT CHANGE UP (ref 3.3–5)
ALP SERPL-CCNC: 55 U/L — SIGNIFICANT CHANGE UP (ref 40–120)
ALP SERPL-CCNC: 56 U/L — SIGNIFICANT CHANGE UP (ref 40–120)
ALT FLD-CCNC: 27 U/L — SIGNIFICANT CHANGE UP (ref 12–78)
ALT FLD-CCNC: 28 U/L — SIGNIFICANT CHANGE UP (ref 12–78)
ANION GAP SERPL CALC-SCNC: 6 MMOL/L — SIGNIFICANT CHANGE UP (ref 5–17)
ANION GAP SERPL CALC-SCNC: 7 MMOL/L — SIGNIFICANT CHANGE UP (ref 5–17)
ANION GAP SERPL CALC-SCNC: 8 MMOL/L — SIGNIFICANT CHANGE UP (ref 5–17)
ANION GAP SERPL CALC-SCNC: 9 MMOL/L — SIGNIFICANT CHANGE UP (ref 5–17)
APPEARANCE UR: CLEAR — SIGNIFICANT CHANGE UP
APTT BLD: 154.2 SEC — CRITICAL HIGH (ref 24.5–35.6)
APTT BLD: 80.6 SEC — HIGH (ref 24.5–35.6)
APTT BLD: 91.6 SEC — HIGH (ref 24.5–35.6)
AST SERPL-CCNC: 29 U/L — SIGNIFICANT CHANGE UP (ref 15–37)
AST SERPL-CCNC: 33 U/L — SIGNIFICANT CHANGE UP (ref 15–37)
BACTERIA # UR AUTO: ABNORMAL /HPF
BILIRUB SERPL-MCNC: 1.3 MG/DL — HIGH (ref 0.2–1.2)
BILIRUB SERPL-MCNC: 1.7 MG/DL — HIGH (ref 0.2–1.2)
BILIRUB UR-MCNC: NEGATIVE — SIGNIFICANT CHANGE UP
BUN SERPL-MCNC: 22 MG/DL — SIGNIFICANT CHANGE UP (ref 7–23)
BUN SERPL-MCNC: 23 MG/DL — SIGNIFICANT CHANGE UP (ref 7–23)
BUN SERPL-MCNC: 23 MG/DL — SIGNIFICANT CHANGE UP (ref 7–23)
BUN SERPL-MCNC: 26 MG/DL — HIGH (ref 7–23)
CALCIUM SERPL-MCNC: 8.7 MG/DL — SIGNIFICANT CHANGE UP (ref 8.5–10.1)
CALCIUM SERPL-MCNC: 8.7 MG/DL — SIGNIFICANT CHANGE UP (ref 8.5–10.1)
CALCIUM SERPL-MCNC: 8.8 MG/DL — SIGNIFICANT CHANGE UP (ref 8.5–10.1)
CALCIUM SERPL-MCNC: 8.8 MG/DL — SIGNIFICANT CHANGE UP (ref 8.5–10.1)
CHLORIDE SERPL-SCNC: 102 MMOL/L — SIGNIFICANT CHANGE UP (ref 96–108)
CHLORIDE SERPL-SCNC: 102 MMOL/L — SIGNIFICANT CHANGE UP (ref 96–108)
CHLORIDE SERPL-SCNC: 103 MMOL/L — SIGNIFICANT CHANGE UP (ref 96–108)
CHLORIDE SERPL-SCNC: 103 MMOL/L — SIGNIFICANT CHANGE UP (ref 96–108)
CHOLEST SERPL-MCNC: 87 MG/DL — SIGNIFICANT CHANGE UP
CK SERPL-CCNC: 186 U/L — SIGNIFICANT CHANGE UP (ref 26–308)
CO2 SERPL-SCNC: 29 MMOL/L — SIGNIFICANT CHANGE UP (ref 22–31)
CO2 SERPL-SCNC: 29 MMOL/L — SIGNIFICANT CHANGE UP (ref 22–31)
CO2 SERPL-SCNC: 30 MMOL/L — SIGNIFICANT CHANGE UP (ref 22–31)
CO2 SERPL-SCNC: 32 MMOL/L — HIGH (ref 22–31)
COLOR SPEC: YELLOW — SIGNIFICANT CHANGE UP
CREAT SERPL-MCNC: 1.01 MG/DL — SIGNIFICANT CHANGE UP (ref 0.5–1.3)
CREAT SERPL-MCNC: 1.04 MG/DL — SIGNIFICANT CHANGE UP (ref 0.5–1.3)
CREAT SERPL-MCNC: 1.17 MG/DL — SIGNIFICANT CHANGE UP (ref 0.5–1.3)
CREAT SERPL-MCNC: 1.22 MG/DL — SIGNIFICANT CHANGE UP (ref 0.5–1.3)
DIFF PNL FLD: ABNORMAL
EGFR: 64 ML/MIN/1.73M2 — SIGNIFICANT CHANGE UP
EGFR: 67 ML/MIN/1.73M2 — SIGNIFICANT CHANGE UP
EGFR: 78 ML/MIN/1.73M2 — SIGNIFICANT CHANGE UP
EGFR: 81 ML/MIN/1.73M2 — SIGNIFICANT CHANGE UP
EPI CELLS # UR: SIGNIFICANT CHANGE UP
ESTIMATED AVERAGE GLUCOSE: 123 MG/DL — HIGH (ref 68–114)
GLUCOSE SERPL-MCNC: 118 MG/DL — HIGH (ref 70–99)
GLUCOSE SERPL-MCNC: 151 MG/DL — HIGH (ref 70–99)
GLUCOSE SERPL-MCNC: 201 MG/DL — HIGH (ref 70–99)
GLUCOSE SERPL-MCNC: 208 MG/DL — HIGH (ref 70–99)
GLUCOSE UR QL: NEGATIVE MG/DL — SIGNIFICANT CHANGE UP
HCT VFR BLD CALC: 43 % — SIGNIFICANT CHANGE UP (ref 39–50)
HDLC SERPL-MCNC: 24 MG/DL — LOW
HGB BLD-MCNC: 12.9 G/DL — LOW (ref 13–17)
INR BLD: 1.59 RATIO — HIGH (ref 0.85–1.16)
KETONES UR-MCNC: NEGATIVE MG/DL — SIGNIFICANT CHANGE UP
LACTATE SERPL-SCNC: 1.3 MMOL/L — SIGNIFICANT CHANGE UP (ref 0.7–2)
LEGIONELLA AG UR QL: NEGATIVE — SIGNIFICANT CHANGE UP
LEUKOCYTE ESTERASE UR-ACNC: NEGATIVE — SIGNIFICANT CHANGE UP
LIPID PNL WITH DIRECT LDL SERPL: 51 MG/DL — SIGNIFICANT CHANGE UP
M PNEUMO IGM SER-ACNC: 0.21 INDEX — SIGNIFICANT CHANGE UP (ref 0–0.9)
MAGNESIUM SERPL-MCNC: 1.4 MG/DL — LOW (ref 1.6–2.6)
MAGNESIUM SERPL-MCNC: 1.6 MG/DL — SIGNIFICANT CHANGE UP (ref 1.6–2.6)
MAGNESIUM SERPL-MCNC: 1.8 MG/DL — SIGNIFICANT CHANGE UP (ref 1.6–2.6)
MAGNESIUM SERPL-MCNC: 1.9 MG/DL — SIGNIFICANT CHANGE UP (ref 1.6–2.6)
MCHC RBC-ENTMCNC: 19.5 PG — LOW (ref 27–34)
MCHC RBC-ENTMCNC: 30 G/DL — LOW (ref 32–36)
MCV RBC AUTO: 65 FL — LOW (ref 80–100)
MRSA PCR RESULT.: SIGNIFICANT CHANGE UP
MYCOPLASMA AG SPEC QL: NEGATIVE — SIGNIFICANT CHANGE UP
NITRITE UR-MCNC: NEGATIVE — SIGNIFICANT CHANGE UP
NON HDL CHOLESTEROL: 63 MG/DL — SIGNIFICANT CHANGE UP
NRBC # BLD: 0 /100 WBCS — SIGNIFICANT CHANGE UP (ref 0–0)
NT-PROBNP SERPL-SCNC: 2032 PG/ML — HIGH (ref 0–125)
PH UR: 5.5 — SIGNIFICANT CHANGE UP (ref 5–8)
PHOSPHATE SERPL-MCNC: 4.2 MG/DL — SIGNIFICANT CHANGE UP (ref 2.5–4.5)
PHOSPHATE SERPL-MCNC: 4.3 MG/DL — SIGNIFICANT CHANGE UP (ref 2.5–4.5)
PHOSPHATE SERPL-MCNC: 4.3 MG/DL — SIGNIFICANT CHANGE UP (ref 2.5–4.5)
PHOSPHATE SERPL-MCNC: 4.5 MG/DL — SIGNIFICANT CHANGE UP (ref 2.5–4.5)
PLATELET # BLD AUTO: 194 K/UL — SIGNIFICANT CHANGE UP (ref 150–400)
POTASSIUM SERPL-MCNC: 3.9 MMOL/L — SIGNIFICANT CHANGE UP (ref 3.5–5.3)
POTASSIUM SERPL-MCNC: 4.2 MMOL/L — SIGNIFICANT CHANGE UP (ref 3.5–5.3)
POTASSIUM SERPL-MCNC: 4.4 MMOL/L — SIGNIFICANT CHANGE UP (ref 3.5–5.3)
POTASSIUM SERPL-MCNC: 4.7 MMOL/L — SIGNIFICANT CHANGE UP (ref 3.5–5.3)
POTASSIUM SERPL-SCNC: 3.9 MMOL/L — SIGNIFICANT CHANGE UP (ref 3.5–5.3)
POTASSIUM SERPL-SCNC: 4.2 MMOL/L — SIGNIFICANT CHANGE UP (ref 3.5–5.3)
POTASSIUM SERPL-SCNC: 4.4 MMOL/L — SIGNIFICANT CHANGE UP (ref 3.5–5.3)
POTASSIUM SERPL-SCNC: 4.7 MMOL/L — SIGNIFICANT CHANGE UP (ref 3.5–5.3)
PROT SERPL-MCNC: 6.7 GM/DL — SIGNIFICANT CHANGE UP (ref 6–8.3)
PROT SERPL-MCNC: 6.7 GM/DL — SIGNIFICANT CHANGE UP (ref 6–8.3)
PROT UR-MCNC: NEGATIVE MG/DL — SIGNIFICANT CHANGE UP
PROTHROM AB SERPL-ACNC: 18.4 SEC — HIGH (ref 9.9–13.4)
RBC # BLD: 6.62 M/UL — HIGH (ref 4.2–5.8)
RBC # FLD: 18 % — HIGH (ref 10.3–14.5)
RBC CASTS # UR COMP ASSIST: 1 /HPF — SIGNIFICANT CHANGE UP (ref 0–4)
S AUREUS DNA NOSE QL NAA+PROBE: SIGNIFICANT CHANGE UP
S PNEUM AG UR QL: NEGATIVE — SIGNIFICANT CHANGE UP
SODIUM SERPL-SCNC: 139 MMOL/L — SIGNIFICANT CHANGE UP (ref 135–145)
SODIUM SERPL-SCNC: 140 MMOL/L — SIGNIFICANT CHANGE UP (ref 135–145)
SODIUM SERPL-SCNC: 140 MMOL/L — SIGNIFICANT CHANGE UP (ref 135–145)
SODIUM SERPL-SCNC: 141 MMOL/L — SIGNIFICANT CHANGE UP (ref 135–145)
SP GR SPEC: 1.01 — SIGNIFICANT CHANGE UP (ref 1–1.03)
TRIGL SERPL-MCNC: 50 MG/DL — SIGNIFICANT CHANGE UP
TROPONIN I, HIGH SENSITIVITY RESULT: 20.2 NG/L — SIGNIFICANT CHANGE UP
UROBILINOGEN FLD QL: 0.2 MG/DL — SIGNIFICANT CHANGE UP (ref 0.2–1)
WBC # BLD: 7.36 K/UL — SIGNIFICANT CHANGE UP (ref 3.8–10.5)
WBC # FLD AUTO: 7.36 K/UL — SIGNIFICANT CHANGE UP (ref 3.8–10.5)
WBC UR QL: 0 /HPF — SIGNIFICANT CHANGE UP (ref 0–5)

## 2024-11-01 PROCEDURE — 93308 TTE F-UP OR LMTD: CPT | Mod: 26

## 2024-11-01 PROCEDURE — 71045 X-RAY EXAM CHEST 1 VIEW: CPT | Mod: 26

## 2024-11-01 PROCEDURE — 76604 US EXAM CHEST: CPT | Mod: 26

## 2024-11-01 PROCEDURE — 99233 SBSQ HOSP IP/OBS HIGH 50: CPT

## 2024-11-01 PROCEDURE — 99232 SBSQ HOSP IP/OBS MODERATE 35: CPT

## 2024-11-01 PROCEDURE — 99291 CRITICAL CARE FIRST HOUR: CPT | Mod: 25

## 2024-11-01 PROCEDURE — 93306 TTE W/DOPPLER COMPLETE: CPT | Mod: 26

## 2024-11-01 RX ORDER — MAGNESIUM SULFATE IN 0.9% NACL 2 G/50 ML
2 INTRAVENOUS SOLUTION, PIGGYBACK (ML) INTRAVENOUS ONCE
Refills: 0 | Status: COMPLETED | OUTPATIENT
Start: 2024-11-01 | End: 2024-11-01

## 2024-11-01 RX ORDER — METOPROLOL TARTRATE 50 MG
25 TABLET ORAL EVERY 12 HOURS
Refills: 0 | Status: DISCONTINUED | OUTPATIENT
Start: 2024-11-01 | End: 2024-11-01

## 2024-11-01 RX ORDER — DIGOXIN 250 MCG
500 TABLET ORAL ONCE
Refills: 0 | Status: COMPLETED | OUTPATIENT
Start: 2024-11-01 | End: 2024-11-01

## 2024-11-01 RX ORDER — METOPROLOL TARTRATE 50 MG
50 TABLET ORAL
Refills: 0 | Status: DISCONTINUED | OUTPATIENT
Start: 2024-11-01 | End: 2024-11-01

## 2024-11-01 RX ORDER — METOPROLOL TARTRATE 50 MG
50 TABLET ORAL THREE TIMES A DAY
Refills: 0 | Status: DISCONTINUED | OUTPATIENT
Start: 2024-11-01 | End: 2024-11-04

## 2024-11-01 RX ORDER — HEPARIN SODIUM 10000 [USP'U]/ML
10000 INJECTION INTRAVENOUS; SUBCUTANEOUS EVERY 6 HOURS
Refills: 0 | Status: DISCONTINUED | OUTPATIENT
Start: 2024-11-01 | End: 2024-11-03

## 2024-11-01 RX ORDER — COLLAGENASE CLOSTRIDIUM HIST. 250 UNIT/G
1 OINTMENT (GRAM) TOPICAL DAILY
Refills: 0 | Status: DISCONTINUED | OUTPATIENT
Start: 2024-11-01 | End: 2024-11-14

## 2024-11-01 RX ORDER — HEPARIN SODIUM 10000 [USP'U]/ML
1200 INJECTION INTRAVENOUS; SUBCUTANEOUS
Qty: 25000 | Refills: 0 | Status: DISCONTINUED | OUTPATIENT
Start: 2024-11-01 | End: 2024-11-03

## 2024-11-01 RX ORDER — DIGOXIN 250 MCG
250 TABLET ORAL EVERY 6 HOURS
Refills: 0 | Status: DISCONTINUED | OUTPATIENT
Start: 2024-11-01 | End: 2024-11-02

## 2024-11-01 RX ORDER — METOPROLOL TARTRATE 50 MG
25 TABLET ORAL ONCE
Refills: 0 | Status: COMPLETED | OUTPATIENT
Start: 2024-11-01 | End: 2024-11-01

## 2024-11-01 RX ORDER — HEPARIN SODIUM 10000 [USP'U]/ML
5000 INJECTION INTRAVENOUS; SUBCUTANEOUS EVERY 6 HOURS
Refills: 0 | Status: DISCONTINUED | OUTPATIENT
Start: 2024-11-01 | End: 2024-11-03

## 2024-11-01 RX ORDER — MELATONIN 5 MG
6 TABLET ORAL AT BEDTIME
Refills: 0 | Status: DISCONTINUED | OUTPATIENT
Start: 2024-11-01 | End: 2024-11-14

## 2024-11-01 RX ADMIN — Medication 15 MG/HR: at 07:16

## 2024-11-01 RX ADMIN — Medication 15 MILLIGRAM(S): at 00:07

## 2024-11-01 RX ADMIN — Medication 25 MILLIGRAM(S): at 11:20

## 2024-11-01 RX ADMIN — HEPARIN SODIUM 1200 UNIT(S)/HR: 10000 INJECTION INTRAVENOUS; SUBCUTANEOUS at 07:16

## 2024-11-01 RX ADMIN — HEPARIN SODIUM 1200 UNIT(S)/HR: 10000 INJECTION INTRAVENOUS; SUBCUTANEOUS at 22:41

## 2024-11-01 RX ADMIN — Medication 50 MILLIGRAM(S): at 21:10

## 2024-11-01 RX ADMIN — Medication 300 MILLIGRAM(S): at 11:19

## 2024-11-01 RX ADMIN — Medication 500 MICROGRAM(S): at 01:13

## 2024-11-01 RX ADMIN — Medication 250 MICROGRAM(S): at 16:28

## 2024-11-01 RX ADMIN — Medication 5 MILLIGRAM(S): at 21:11

## 2024-11-01 RX ADMIN — METHYLPREDNISOLONE ACETATE 125 MILLIGRAM(S): 80 INJECTION, SUSPENSION INTRALESIONAL; INTRAMUSCULAR; INTRASYNOVIAL; SOFT TISSUE at 00:07

## 2024-11-01 RX ADMIN — HEPARIN SODIUM 1200 UNIT(S)/HR: 10000 INJECTION INTRAVENOUS; SUBCUTANEOUS at 13:45

## 2024-11-01 RX ADMIN — CHLORHEXIDINE GLUCONATE 1 APPLICATION(S): 40 SOLUTION TOPICAL at 05:58

## 2024-11-01 RX ADMIN — Medication 25 MILLIGRAM(S): at 05:58

## 2024-11-01 RX ADMIN — HEPARIN SODIUM 1200 UNIT(S)/HR: 10000 INJECTION INTRAVENOUS; SUBCUTANEOUS at 07:18

## 2024-11-01 RX ADMIN — Medication 1 APPLICATION(S): at 21:12

## 2024-11-01 RX ADMIN — Medication 25 GRAM(S): at 13:45

## 2024-11-01 RX ADMIN — HEPARIN SODIUM 1200 UNIT(S)/HR: 10000 INJECTION INTRAVENOUS; SUBCUTANEOUS at 01:08

## 2024-11-01 RX ADMIN — Medication 81 MILLIGRAM(S): at 11:20

## 2024-11-01 RX ADMIN — HEPARIN SODIUM 1200 UNIT(S)/HR: 10000 INJECTION INTRAVENOUS; SUBCUTANEOUS at 19:16

## 2024-11-01 RX ADMIN — Medication 25 GRAM(S): at 08:59

## 2024-11-01 RX ADMIN — Medication 6 MILLIGRAM(S): at 22:40

## 2024-11-01 RX ADMIN — Medication 25 GRAM(S): at 01:48

## 2024-11-01 NOTE — PROGRESS NOTE ADULT - NS ATTEND AMEND GEN_ALL_CORE FT
Morbid obesity.  AF with RVR.  Decompensated HF with fluid OL.  Echo shows preserved LVEF but decreased RV fnc.  On Lasix gtt at 10 mg/hr.    -Quick wean off Cardizem gtt, given RV dysfunction.  -For rate control, load Digoxin 0.25 mg iv q6 x4, followed by 0.125 mg po qd.  -Will need eval for SHILOH.    -Cont Lasix gtt. Morbid obesity.  AF with RVR.  Decompensated HF with fluid OL.  Echo shows preserved LVEF but decreased RV fnc.  On Lasix gtt at 10 mg/hr.    -Quick wean off Cardizem gtt, given RV dysfunction.  -For rate control, load Digoxin 0.25 mg iv q6 x4, followed by 0.125 mg po qd.  -Increase Lopressor to 50 mg po tid.  -Will need eval for SHILOH.    -Cont Lasix gtt.

## 2024-11-01 NOTE — PROGRESS NOTE ADULT - ASSESSMENT
69 y/o male with a pmhx of Hypertension, Gout, Hypercholesteremia, and Morbid obesity admitted with Decompensated HF with fluid overload, and AF with RVR.  Cardiology consulted for assistance with rate control and diuresis in the setting of new Afib and CHF.     -Echo ASAP.  -continue Lasix gtt at 10 mg/hr. with spironolactone 25 mg qd.  -Continue Cardizem gtt for now, though will change if pt has significant LV dysfunction.  -Will need eval for SHILOH.   67 y/o male with a pmhx of Hypertension, Gout, Hypercholesteremia, and Morbid obesity admitted with Decompensated HF with fluid overload, and AF with RVR.  Cardiology consulted for assistance with rate control and diuresis in the setting of new Afib and CHF.     -Echo ASAP.  -continue Lasix gtt at 10 mg/hr. with spironolactone 25 mg qd.  -Replete Mg to 2, and K+ to 4, monitor renal function closely  -Heart rate improved over night on Cardizem gtt  -Will need eval for SHILOH.   67 y/o male with a pmhx of Hypertension, Gout, Hypercholesteremia, and Morbid obesity admitted with Decompensated HF with fluid overload, and AF with RVR.  Cardiology consulted for assistance with rate control and diuresis in the setting of new Afib and CHF.     -Echo ASAP.  -continue Lasix gtt at 10 mg/hr. with spironolactone 25 mg qd.  -Replete Mg to 2, and K+ to 4, monitor renal function closely  -Heart rate improved over night on Cardizem gtt, metoprolol increased to 50 mg BID per primary team   - preliminary TTE read with preserved LVEF with significant RV dysfunction, and pulmonary HTN- would recommend transition off IV Cardizem with up titration of BB.    -Will need eval for SHILOH.   67 y/o male with a pmhx of Hypertension, Gout, Hypercholesteremia, and Morbid obesity admitted with Decompensated HF with fluid overload, and AF with RVR.  Cardiology consulted for assistance with rate control and diuresis in the setting of new Afib and CHF. Currently on HiFlo 60% FIO2, 50 L per/min with SpO2 94-95%.       -continue Lasix gtt at 10 mg/hr. with spironolactone 25 mg qd.  -Strict I&O, daily weights (huynh cath in situ for ongoing monitoring)   -Replete Mg to 2, and K+ to 4, monitor renal function closely  -Heart rate improved over night on Cardizem gtt, metoprolol increased to 50 mg BID per primary team   - preliminary TTE read with preserved LVEF with significant RV dysfunction, and pulmonary HTN- would recommend transition off IV Cardizem with up titration of BB.  -Wean off Hiflo to nasal cannula when able, will need SHILOH evaluation   -recommend PT wound care eval for RLE wound     ALEIDA Schneider [facility]   Cardiology Service  [phone]    Note incomplete until signed by Attending.  69 y/o male with a pmhx of Hypertension, Gout, Hypercholesteremia, and Morbid obesity admitted with Decompensated HF with fluid overload, and AF with RVR.  Cardiology consulted for assistance with rate control and diuresis in the setting of new Afib and CHF. Currently on HiFlo 60% FIO2, 50 L per/min with SpO2 94-95%.       -continue Lasix gtt at 10 mg/hr. with spironolactone 25 mg qd.  -Strict I&O, daily weights (huynh cath in situ for ongoing monitoring)   -Replete Mg to 2, and K+ to 4, monitor renal function closely    -Heart rate improved over night on Cardizem gtt, metoprolol increased to 50 mg BID per primary team   - preliminary TTE read with preserved LVEF with significant RV dysfunction, and pulmonary HTN-   ***would recommend transition off IV Cardizem with up titration of BB to Metoprolol 50 mg TID, and Digoxin load 0.25 mg iv q6 x4, followed by 0.125 mg po qd.    -Wean off Hiflo to nasal cannula when able, will need SHILOH evaluation   -recommend PT wound care eval for RLE wound     ALEIDA Schneider [facility]   Cardiology Service  [phone]    Note incomplete until signed by Attending.  67 y/o male with a pmhx of Hypertension, Gout, Hypercholesteremia, and Morbid obesity admitted with Decompensated HF with fluid overload, and AF with RVR.  Cardiology consulted for assistance with rate control and diuresis in the setting of new Afib and CHF. Currently on HiFlo 60% FIO2, 50 L per/min with SpO2 94-95%.       -continue Lasix gtt at 10 mg/hr. with spironolactone 25 mg qd.  -Strict I&O, daily weights (huynh cath in situ for ongoing monitoring)   -Replete Mg to 2, and K+ to 4, monitor renal function closely    -Heart rate improved over night on Cardizem gtt, metoprolol increased to 50 mg BID per primary team   - preliminary TTE read with preserved LVEF with significant RV dysfunction, and pulmonary HTN-   ***would recommend transition off IV Cardizem with up titration of BB to Metoprolol 50 mg TID, and Digoxin load 0.25 mg iv q6 x4, followed by 0.125 mg po qd.    -Wean off Hiflo to nasal cannula when able, will need SHILOH evaluation   -recommend PT wound care eval for RLE wound     ALEIDA Schneider [facility]   Cardiology Service  [phone]

## 2024-11-01 NOTE — CONSULT NOTE ADULT - ASSESSMENT
67 yo m pmhx obesity, HTN on metoprolol XL and amlodipine, HLD, PVD and gout admitted with new onset FILI, acute congestive heart failure, acute hypoxic respiratory failure, component of pulmonary edema, b/l LE edema with ulcerations of right leg.     NEURO: No active issues  CV: fili, decreased PO metoprolol to allow for up-titration of diltiazem, gave additional IVP diltiazem and increased gtt.  Unfortunately patient still with uncontrolled heart rate given dose of digoxin to help with rate control.  TTE changed to stat to be performed first thing in the morning. f/u cardiology recs   RESP: acute hypoxic respiratory failure now on HFNC, 50LPM, 60% fio2. new RLL infiltrate ?atelectasis, ?effusion, ?pna.  encourage incentive spirometry when awake. nebs prn  RENAL: strict I&Os, monitor electrolytes, replace ltyes as needed.  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    GI: Dash/TLC Diet  ENDO: No active issues  ID: new RLL infiltrate, afebrile, no wbc count, cx sent, montior off abx.  RLE ulcerations, wound care consult pending  HEME: Heparin gtt for ac  DISPO: Full code.  Updated patient and wife at bedside.      Case discussed with eICU attending Dr. Keenan    Critical Care time: 60 mins assessing presenting problems of acute illness that poses high probability of life threatening deterioration or end organ damage/dysfunction.  Medical decision making including Initiating plan of care, reviewing data, reviewing radiology, discussing with multidisciplinary team, non inclusive of procedures, discussing goals of care with patient/family    DATE OF DOCUMENTATION EQUIVALENT TO DATE OF SERVICES RENDERED

## 2024-11-01 NOTE — PROGRESS NOTE ADULT - SUBJECTIVE AND OBJECTIVE BOX
INTERVAL HPI/OVERNIGHT EVENTS:   HPI:  68-year-old obese male with a PMH of HTN, HLD, PVD, gout sent to the ED from PCP’s office to be evaluated for new onset Afib. Endorses 2 weeks history of TRINH, orthopnea, PND, associated with b/l LE edema and increase abdominal size. Upon evaluation, patient became SOB by just moving in the bed, requires 3L-NC. Denies chest pain, palpitation, headache, dizziness, vision changes, fever, chills, abd pain, N/V/D or nay other acute symptoms. Endorses he has changed his diet drastically since recognized increasing the abdomen size. Upon ED arrival patient was found to be in rapid Afib with HR in 150s. Initially received Metoprolol 5mg IVP x3 with minimal improvement, then cardiology consulted, received Cardizem 25mg IV, then placed in Cardizem gtt, Heparin gtt and Lasix 80mg. Labs remarkable for D-Dimer:224, BNP:2339. Normotensive. Afebrile.   CT-Chest: No evidence of main/proximal segmental pulmonary embolus. Left upper lobe peripheral airspace consolidation and 1.2 cm nodular airspace consolidation in the left lower lobe. Small right pleural effusion.   (31 Oct 2024 04:30)      CENTRAL LINE: [ ] YES [ ] NO  LOCATION:   DATE INSERTED:  REMOVE: [ ] YES [ ] NO  EXPLAIN:    BURCH: [ ] YES [ ] NO    DATE INSERTED:  REMOVE:  [ ] YES [ ] NO  EXPLAIN:    A-LINE:  [ ] YES [ ] NO  LOCATION:   DATE INSERTED:  REMOVE:  [ ] YES [ ] NO  EXPLAIN:    PAST MEDICAL & SURGICAL HISTORY:  Hypertension      Gout      Hypercholesteremia      Morbid obesity      H/O colonoscopy          REVIEW OF SYSTEMS:    Negative ROS aside from HPI/Interval events above.    ICU Vital Signs Last 24 Hrs  T(C): 36.5 (01 Nov 2024 08:00), Max: 36.9 (31 Oct 2024 12:09)  T(F): 97.7 (01 Nov 2024 08:00), Max: 98.4 (31 Oct 2024 12:09)  HR: 96 (01 Nov 2024 11:00) (96 - 911)  BP: 110/79 (01 Nov 2024 11:00) (100/67 - 129/85)  BP(mean): 89 (01 Nov 2024 11:00) (76 - 111)  ABP: --  ABP(mean): --  RR: 21 (01 Nov 2024 11:00) (14 - 26)  SpO2: 93% (01 Nov 2024 11:00) (90% - 100%)    O2 Parameters below as of 01 Nov 2024 08:44  Patient On (Oxygen Delivery Method): nasal cannula, high flow  O2 Flow (L/min): 50  O2 Concentration (%): 65        ABG - ( 31 Oct 2024 23:27 )  pH, Arterial: 7.38  pH, Blood: x     /  pCO2: 56    /  pO2: 87    / HCO3: 33    / Base Excess: 6.3   /  SaO2: 98.0                I&O's Detail    31 Oct 2024 07:01  -  01 Nov 2024 07:00  --------------------------------------------------------  IN:    Diltiazem: 60 mL    Diltiazem: 105 mL    Furosemide: 55 mL    Heparin Infusion: 280 mL    Heparin Infusion: 84 mL    IV PiggyBack: 50 mL    Oral Fluid: 848 mL  Total IN: 1482 mL    OUT:    Voided (mL): 2520 mL  Total OUT: 2520 mL    Total NET: -1038 mL      01 Nov 2024 07:01  -  01 Nov 2024 11:38  --------------------------------------------------------  IN:    Diltiazem: 50 mL    Furosemide: 20 mL    Heparin Infusion: 48 mL    IV PiggyBack: 50 mL    Oral Fluid: 250 mL  Total IN: 418 mL    OUT:    Indwelling Catheter - Urethral (mL): 375 mL  Total OUT: 375 mL    Total NET: 43 mL        CAPILLARY BLOOD GLUCOSE      POCT Blood Glucose.: 119 mg/dL (31 Oct 2024 22:56)        PHYSICAL EXAM:    General: obese adult male, sitting in bed, nad   HEENT: NC/AT, HFNC  PULM: diminished b/l  CVS: afib  ABD: large, Soft, nondistended, nontender, +bs  EXT: 2+ pitting edema, nontender, RLE with ulcerations  SKIN: Warm   NEURO: Alert, oriented, interactive. no confusion.      LABS:                        12.9   7.36  )-----------( 194      ( 01 Nov 2024 06:40 )             43.0      11-01    140  |  102  |  22  ----------------------------<  151[H]  4.2   |  29  |  1.01    Ca    8.8      01 Nov 2024 06:40  Phos  4.5     11-01  Mg     1.6     11-01    TPro  6.7  /  Alb  3.5  /  TBili  1.7[H]  /  DBili  x   /  AST  29  /  ALT  28  /  AlkPhos  56  10-31    PT/INR - ( 31 Oct 2024 23:20 )   PT: 18.4 sec;   INR: 1.59 ratio         PTT - ( 01 Nov 2024 06:40 )  PTT:91.6 sec  Urinalysis Basic - ( 01 Nov 2024 06:40 )    Color: x / Appearance: x / SG: x / pH: x  Gluc: 151 mg/dL / Ketone: x  / Bili: x / Urobili: x   Blood: x / Protein: x / Nitrite: x   Leuk Esterase: x / RBC: x / WBC x   Sq Epi: x / Non Sq Epi: x / Bacteria: x

## 2024-11-01 NOTE — DIETITIAN INITIAL EVALUATION ADULT - NUTRITIONGOAL OUTCOME1
Pt to consume sodium controlled diet during LOS and to verbalize need for lifestyle change during LOS

## 2024-11-01 NOTE — CHART NOTE - NSCHARTNOTEFT_GEN_A_CORE
:  KRISHAN Jerez dr    Indication:  afib, hypoxemia     PROCEDURE:  [x ] LIMITED ECHO  [x ] LIMITED CHEST  [ ] LIMITED RETROPERITONEAL  [ ] LIMITED ABDOMINAL  [ ] LIMITED DVT  [ ] NEEDLE GUIDANCE VASCULAR  [ ] NEEDLE GUIDANCE THORACENTESIS  [ ] NEEDLE GUIDANCE PARACENTESIS  [ ] NEEDLE GUIDANCE PERICARDIOCENTESIS  [ ] OTHER    FINDINGS:  Chest: A-line predominant, normal aeration pattern bilat. Posterior B lines with RLL consolidation     ECHO: technically limited study. LV>RV with dilated atria with preserved LV systolic function with no wall motion abnormalities, nor septal bowing/flattening  No pericardial effusion  IVC: unable to visualize     INTERPRETATION:  lung exam with posterior B lines and RLL consolidation c/f atelectasis   cardiac exam limited but with grossly preserved LV function       images uploaded to [employer]

## 2024-11-01 NOTE — CONSULT NOTE ADULT - SUBJECTIVE AND OBJECTIVE BOX
Patient is a 68y old  Male who presents with a chief complaint of New onset Afib & CHF (31 Oct 2024 16:58)      BRIEF HOSPITAL COURSE:   69 yo m pmhx obesity, HTN on metoprolol XL and amlodipine, HLD, PVD and gout admitted with new onset JOVAN, acute congestive heart failure, acute hypoxic respiratory failure, component of pulmonary edema, b/l LE edema with ulcerations of right leg.     Events last 24 hours:   RRT this evening for JOVAN, HR in the 140-160s, increasing fio2 requirements.  Dilt gtt increased from 5 to 7.5 and ICU consult placed.  Patient seen at bedside, endorses he feels fine unchanged from yesterday other than feeling tired because he hasn't been able to sleep here.  Patient now on NRB and HR not well controlled.        PAST MEDICAL & SURGICAL HISTORY:  Hypertension  Gout  Hypercholesteremia  Morbid obesity  H/O colonoscopy      Allergies  niacin (Other)      FAMILY HISTORY:  Family history of pacemaker (Mother)  FH: diabetes mellitus (Father)      Social History:   from home, ,       Review of Systems:  No active complaints      Physical Examination:    General: April obese adult male, sitting in bed, nad     HEENT: NC/AT, NRB in place    PULM: diminished b/l    CVS: jovan    ABD: large, Soft, nondistended, nontender, +bs    EXT: 2+ pitting edema, nontender, RLE with ulcerations    SKIN: Warm     NEURO: Alert, oriented, interactive      Medications:  diltiazem Infusion 10 mG/Hr IV Continuous <Continuous>  furosemide Infusion 10 mG/Hr IV Continuous <Continuous>  metoprolol tartrate 25 milliGRAM(s) Oral every 12 hours  spironolactone 25 milliGRAM(s) Oral daily  acetaminophen     Tablet .. 650 milliGRAM(s) Oral every 6 hours PRN  melatonin 3 milliGRAM(s) Oral at bedtime PRN  ondansetron Injectable 4 milliGRAM(s) IV Push every 8 hours PRN  aspirin  chewable 81 milliGRAM(s) Oral daily  allopurinol 300 milliGRAM(s) Oral daily  rosuvastatin 5 milliGRAM(s) Oral at bedtime  magnesium sulfate  IVPB 2 Gram(s) IV Intermittent once  influenza  Vaccine (HIGH DOSE) 0.5 milliLiter(s) IntraMuscular once  chlorhexidine 2% Cloths 1 Application(s) Topical <User Schedule>      ICU Vital Signs Last 24 Hrs  T(C): 36.6 (31 Oct 2024 22:23), Max: 36.9 (31 Oct 2024 05:40)  T(F): 97.9 (31 Oct 2024 22:23), Max: 98.4 (31 Oct 2024 05:40)  HR: 156 (01 Nov 2024 00:46) (110 - 156)  BP: 113/78 (31 Oct 2024 22:23) (100/71 - 123/76)  BP(mean): --  ABP: --  ABP(mean): --  RR: 23 (01 Nov 2024 00:46) (20 - 25)  SpO2: 96% (01 Nov 2024 00:46) (92% - 98%)    O2 Parameters below as of 31 Oct 2024 22:23  Patient On (Oxygen Delivery Method): nasal cannula  O2 Flow (L/min): 6      Vital Signs Last 24 Hrs  T(C): 36.6 (31 Oct 2024 22:23), Max: 36.9 (31 Oct 2024 05:40)  T(F): 97.9 (31 Oct 2024 22:23), Max: 98.4 (31 Oct 2024 05:40)  HR: 156 (01 Nov 2024 00:46) (110 - 156)  BP: 113/78 (31 Oct 2024 22:23) (100/71 - 123/76)  BP(mean): --  RR: 23 (01 Nov 2024 00:46) (20 - 25)  SpO2: 96% (01 Nov 2024 00:46) (92% - 98%)    Parameters below as of 31 Oct 2024 22:23  Patient On (Oxygen Delivery Method): nasal cannula  O2 Flow (L/min): 6      ABG - ( 31 Oct 2024 23:27 )  pH, Arterial: 7.38  pH, Blood: x     /  pCO2: 56    /  pO2: 87    / HCO3: 33    / Base Excess: 6.3   /  SaO2: 98.0        I&O's Detail    31 Oct 2024 07:01  -  01 Nov 2024 00:48  --------------------------------------------------------  IN:    Diltiazem: 60 mL    Furosemide: 15 mL    Heparin Infusion: 280 mL    Oral Fluid: 848 mL  Total IN: 1203 mL    OUT:    Voided (mL): 1120 mL  Total OUT: 1120 mL  Total NET: 83 mL      LABS:                        12.4   9.12  )-----------( 198      ( 31 Oct 2024 23:20 )             40.8     10-31    141  |  103  |  23  ----------------------------<  118[H]  3.9   |  30  |  1.04    Ca    8.8      31 Oct 2024 23:20  Phos  4.3     10-31  Mg     1.4     10-31    TPro  6.7  /  Alb  3.5  /  TBili  1.7[H]  /  DBili  x   /  AST  29  /  ALT  28  /  AlkPhos  56  10-31      CAPILLARY BLOOD GLUCOSE  POCT Blood Glucose.: 119 mg/dL (31 Oct 2024 22:56)      PT/INR - ( 31 Oct 2024 23:20 )   PT: 18.4 sec;   INR: 1.59 ratio    PTT - ( 31 Oct 2024 23:20 )  PTT:154.2 sec      Urinalysis Basic - ( 31 Oct 2024 23:20 )  Color: x / Appearance: x / SG: x / pH: x  Gluc: 118 mg/dL / Ketone: x  / Bili: x / Urobili: x   Blood: x / Protein: x / Nitrite: x   Leuk Esterase: x / RBC: x / WBC x   Sq Epi: x / Non Sq Epi: x / Bacteria: x      CULTURES:  pending      RADIOLOGY:   < from: US Duplex Venous Lower Ext Complete, Bilateral (10.31.24 @ 19:43) >    ACC: [id number] EXAM:  US DPLX LWR EXT VEINS COMPL BI   ORDERED BY: TRISH BARROS     PROCEDURE DATE:  10/31/2024        INTERPRETATION:  CLINICAL HISTORY: Bilateral lower extremity swelling and   pain    COMPARISON: Ultrasound dated 5/2/2017.    TECHNIQUE: Grayscale color and Doppler examination of the bilateral lower   extremity deep venous systems.    FINDINGS:    Sonographic evaluation of the bilateral common femoral veins, superficial   femoral veins and popliteal veins shows normal flow, compressibility,   respiratory variation and augmentation.  No left calf vein thrombosis. Right calf veins not visualized.    IMPRESSION:    No evidence for acute DVT in the bilateral lower extremity deep venous   systems.    --- End of Report ---    JACQUES FRIEDMAN MD  This document has been electronically signed. Oct 31 2024  8:24PM    < end of copied text > Patient is a 68y old  Male who presents with a chief complaint of New onset Afib & CHF (31 Oct 2024 16:58)      BRIEF HOSPITAL COURSE:   69 yo m pmhx obesity, HTN on metoprolol XL and amlodipine, HLD, PVD and gout admitted with new onset JOVAN, acute congestive heart failure, acute hypoxic respiratory failure, component of pulmonary edema, b/l LE edema with ulcerations of right leg.     Events last 24 hours:   RRT this evening for JOVAN, HR in the 140-160s, increasing fio2 requirements.  Dilt gtt increased from 5 to 7.5 and ICU consult placed.  Patient seen at bedside, endorses he feels fine unchanged from yesterday other than feeling tired because he hasn't been able to sleep here.  Patient now on NRB and HR not well controlled.  Patient upgraded to critical care service.        PAST MEDICAL & SURGICAL HISTORY:  Hypertension  Gout  Hypercholesteremia  Morbid obesity  H/O colonoscopy      Allergies  niacin (Other)      FAMILY HISTORY:  Family history of pacemaker (Mother)  FH: diabetes mellitus (Father)      Social History:   from home, ,       Review of Systems:  No active complaints      Physical Examination:    General: April obese adult male, sitting in bed, nad     HEENT: NC/AT, NRB in place    PULM: diminished b/l    CVS: jovan    ABD: large, Soft, nondistended, nontender, +bs    EXT: 2+ pitting edema, nontender, RLE with ulcerations    SKIN: Warm     NEURO: Alert, oriented, interactive      Medications:  diltiazem Infusion 10 mG/Hr IV Continuous <Continuous>  furosemide Infusion 10 mG/Hr IV Continuous <Continuous>  metoprolol tartrate 25 milliGRAM(s) Oral every 12 hours  spironolactone 25 milliGRAM(s) Oral daily  acetaminophen     Tablet .. 650 milliGRAM(s) Oral every 6 hours PRN  melatonin 3 milliGRAM(s) Oral at bedtime PRN  ondansetron Injectable 4 milliGRAM(s) IV Push every 8 hours PRN  aspirin  chewable 81 milliGRAM(s) Oral daily  allopurinol 300 milliGRAM(s) Oral daily  rosuvastatin 5 milliGRAM(s) Oral at bedtime  magnesium sulfate  IVPB 2 Gram(s) IV Intermittent once  influenza  Vaccine (HIGH DOSE) 0.5 milliLiter(s) IntraMuscular once  chlorhexidine 2% Cloths 1 Application(s) Topical <User Schedule>      ICU Vital Signs Last 24 Hrs  T(C): 36.6 (31 Oct 2024 22:23), Max: 36.9 (31 Oct 2024 05:40)  T(F): 97.9 (31 Oct 2024 22:23), Max: 98.4 (31 Oct 2024 05:40)  HR: 156 (01 Nov 2024 00:46) (110 - 156)  BP: 113/78 (31 Oct 2024 22:23) (100/71 - 123/76)  BP(mean): --  ABP: --  ABP(mean): --  RR: 23 (01 Nov 2024 00:46) (20 - 25)  SpO2: 96% (01 Nov 2024 00:46) (92% - 98%)    O2 Parameters below as of 31 Oct 2024 22:23  Patient On (Oxygen Delivery Method): nasal cannula  O2 Flow (L/min): 6      Vital Signs Last 24 Hrs  T(C): 36.6 (31 Oct 2024 22:23), Max: 36.9 (31 Oct 2024 05:40)  T(F): 97.9 (31 Oct 2024 22:23), Max: 98.4 (31 Oct 2024 05:40)  HR: 156 (01 Nov 2024 00:46) (110 - 156)  BP: 113/78 (31 Oct 2024 22:23) (100/71 - 123/76)  BP(mean): --  RR: 23 (01 Nov 2024 00:46) (20 - 25)  SpO2: 96% (01 Nov 2024 00:46) (92% - 98%)    Parameters below as of 31 Oct 2024 22:23  Patient On (Oxygen Delivery Method): nasal cannula  O2 Flow (L/min): 6      ABG - ( 31 Oct 2024 23:27 )  pH, Arterial: 7.38  pH, Blood: x     /  pCO2: 56    /  pO2: 87    / HCO3: 33    / Base Excess: 6.3   /  SaO2: 98.0        I&O's Detail    31 Oct 2024 07:01  -  01 Nov 2024 00:48  --------------------------------------------------------  IN:    Diltiazem: 60 mL    Furosemide: 15 mL    Heparin Infusion: 280 mL    Oral Fluid: 848 mL  Total IN: 1203 mL    OUT:    Voided (mL): 1120 mL  Total OUT: 1120 mL  Total NET: 83 mL      LABS:                        12.4   9.12  )-----------( 198      ( 31 Oct 2024 23:20 )             40.8     10-31    141  |  103  |  23  ----------------------------<  118[H]  3.9   |  30  |  1.04    Ca    8.8      31 Oct 2024 23:20  Phos  4.3     10-31  Mg     1.4     10-31    TPro  6.7  /  Alb  3.5  /  TBili  1.7[H]  /  DBili  x   /  AST  29  /  ALT  28  /  AlkPhos  56  10-31      CAPILLARY BLOOD GLUCOSE  POCT Blood Glucose.: 119 mg/dL (31 Oct 2024 22:56)      PT/INR - ( 31 Oct 2024 23:20 )   PT: 18.4 sec;   INR: 1.59 ratio    PTT - ( 31 Oct 2024 23:20 )  PTT:154.2 sec      Urinalysis Basic - ( 31 Oct 2024 23:20 )  Color: x / Appearance: x / SG: x / pH: x  Gluc: 118 mg/dL / Ketone: x  / Bili: x / Urobili: x   Blood: x / Protein: x / Nitrite: x   Leuk Esterase: x / RBC: x / WBC x   Sq Epi: x / Non Sq Epi: x / Bacteria: x      CULTURES:  pending      RADIOLOGY:   < from: US Duplex Venous Lower Ext Complete, Bilateral (10.31.24 @ 19:43) >    ACC: [id number] EXAM:  US DPLX LWR EXT VEINS COMPL BI   ORDERED BY: TRISH BARROS     PROCEDURE DATE:  10/31/2024        INTERPRETATION:  CLINICAL HISTORY: Bilateral lower extremity swelling and   pain    COMPARISON: Ultrasound dated 5/2/2017.    TECHNIQUE: Grayscale color and Doppler examination of the bilateral lower   extremity deep venous systems.    FINDINGS:    Sonographic evaluation of the bilateral common femoral veins, superficial   femoral veins and popliteal veins shows normal flow, compressibility,   respiratory variation and augmentation.  No left calf vein thrombosis. Right calf veins not visualized.    IMPRESSION:    No evidence for acute DVT in the bilateral lower extremity deep venous   systems.    --- End of Report ---    JACQUES FRIEDMAN MD  This document has been electronically signed. Oct 31 2024  8:24PM    < end of copied text >

## 2024-11-01 NOTE — DIETITIAN INITIAL EVALUATION ADULT - OTHER INFO
Denies difficulty chewing/swallowing. Reports weight gain due to fluid retention.   Reinforced low sodium diet with pt. Discussed portion control, possible fluid restrictions and importance of daily weights with pt. Pt with no preferences or further questions at this time. Declines written education at this time. Made aware RD remains available.

## 2024-11-01 NOTE — DIETITIAN INITIAL EVALUATION ADULT - PERTINENT MEDS FT
MEDICATIONS  (STANDING):  allopurinol 300 milliGRAM(s) Oral daily  aspirin  chewable 81 milliGRAM(s) Oral daily  chlorhexidine 2% Cloths 1 Application(s) Topical <User Schedule>  diltiazem Infusion 15 mG/Hr (15 mL/Hr) IV Continuous <Continuous>  furosemide Infusion 10 mG/Hr (5 mL/Hr) IV Continuous <Continuous>  heparin  Infusion. 1200 Unit(s)/Hr (12 mL/Hr) IV Continuous <Continuous>  influenza  Vaccine (HIGH DOSE) 0.5 milliLiter(s) IntraMuscular once  metoprolol tartrate 50 milliGRAM(s) Oral two times a day  rosuvastatin 5 milliGRAM(s) Oral at bedtime  spironolactone 25 milliGRAM(s) Oral daily    MEDICATIONS  (PRN):  acetaminophen     Tablet .. 650 milliGRAM(s) Oral every 6 hours PRN Temp greater or equal to 38C (100.4F), Mild Pain (1 - 3)  heparin   Injectable 53362 Unit(s) IV Push every 6 hours PRN For aPTT less than 40  heparin   Injectable 5000 Unit(s) IV Push every 6 hours PRN For aPTT between 40 - 57  melatonin 3 milliGRAM(s) Oral at bedtime PRN Insomnia  ondansetron Injectable 4 milliGRAM(s) IV Push every 8 hours PRN Nausea and/or Vomiting

## 2024-11-01 NOTE — PHYSICAL THERAPY INITIAL EVALUATION ADULT - MODALITIES TREATMENT COMMENTS
Pt presents c multiple open wounds to right leg c minimal exudates, 10% slough, intact periwound c rashes and redness noted, and no odor.

## 2024-11-01 NOTE — DIETITIAN INITIAL EVALUATION ADULT - PERTINENT LABORATORY DATA
11-01    139  |  103  |  23  ----------------------------<  208[H]  4.7   |  29  |  1.22    Ca    8.7      01 Nov 2024 13:00  Phos  4.3     11-01  Mg     1.8     11-01    TPro  6.7  /  Alb  3.2[L]  /  TBili  1.3[H]  /  DBili  x   /  AST  33  /  ALT  27  /  AlkPhos  55  11-01  POCT Blood Glucose.: 119 mg/dL (10-31-24 @ 22:56)  A1C with Estimated Average Glucose Result: 5.9 % (10-31-24 @ 08:45)

## 2024-11-01 NOTE — PROGRESS NOTE ADULT - SUBJECTIVE AND OBJECTIVE BOX
68M HTN, ?PVD, gout, p/w AF with RVR. Endorses 2 weeks history of TRINH, orthopnea, PND, associated with b/l LE edema and increased abdominal girth. Initially received Metoprolol 5mg IVP x3 with minimal improvement, then put on Cardizem gtt. On heparin gtt, and received IV Lasix. NTproBNP 2339.    CT-Chest: No evidence of main/proximal segmental pulmonary embolus. Left upper lobe peripheral airspace consolidation and 1.2 cm nodular airspace consolidation in the left lower lobe. Small right pleural effusion.    ECG: AF;  anterior infarct, age undetermined  Tele: AF w RVR      PAST MEDICAL & SURGICAL HISTORY:  Hypertension  Gout  Hypercholesteremia  Morbid obesity  H/O colonoscopy      INTERVAL HISTORY: ________________________________________________  	  MEDICATIONS:  MEDICATIONS  (STANDING):  allopurinol 300 milliGRAM(s) Oral daily  aspirin  chewable 81 milliGRAM(s) Oral daily  chlorhexidine 2% Cloths 1 Application(s) Topical <User Schedule>  diltiazem Infusion 15 mG/Hr (15 mL/Hr) IV Continuous <Continuous>  furosemide Infusion 10 mG/Hr (5 mL/Hr) IV Continuous <Continuous>  heparin  Infusion. 1200 Unit(s)/Hr (12 mL/Hr) IV Continuous <Continuous>  influenza  Vaccine (HIGH DOSE) 0.5 milliLiter(s) IntraMuscular once  metoprolol tartrate 25 milliGRAM(s) Oral every 12 hours  rosuvastatin 5 milliGRAM(s) Oral at bedtime  spironolactone 25 milliGRAM(s) Oral daily    MEDICATIONS  (PRN):  acetaminophen     Tablet .. 650 milliGRAM(s) Oral every 6 hours PRN Temp greater or equal to 38C (100.4F), Mild Pain (1 - 3)  heparin   Injectable 5000 Unit(s) IV Push every 6 hours PRN For aPTT between 40 - 57  heparin   Injectable 01519 Unit(s) IV Push every 6 hours PRN For aPTT less than 40  melatonin 3 milliGRAM(s) Oral at bedtime PRN Insomnia  ondansetron Injectable 4 milliGRAM(s) IV Push every 8 hours PRN Nausea and/or Vomiting      Vitals:  T(F): 97.7 (11-01-24 @ 08:00), Max: 98.4 (10-31-24 @ 12:09)  HR: 118 (11-01-24 @ 09:00) (108 - 163)  BP: 102/73 (11-01-24 @ 09:00) (102/73 - 129/85)  RR: 24 (11-01-24 @ 09:00) (17 - 26)  SpO2: 93% (11-01-24 @ 09:00) (90% - 100%)  Wt(kg): --    10-31 @ 07:01  -  11-01 @ 07:00  --------------------------------------------------------  IN:    Diltiazem: 60 mL    Diltiazem: 105 mL    Furosemide: 55 mL    Heparin Infusion: 280 mL    Heparin Infusion: 84 mL    IV PiggyBack: 50 mL    Oral Fluid: 848 mL  Total IN: 1482 mL    OUT:    Voided (mL): 2520 mL  Total OUT: 2520 mL    Total NET: -1038 mL      11-01 @ 07:01  -  11-01 @ 09:11  --------------------------------------------------------  IN:    Diltiazem: 30 mL    Furosemide: 10 mL    Heparin Infusion: 24 mL    Oral Fluid: 250 mL  Total IN: 314 mL    OUT:    Indwelling Catheter - Urethral (mL): 275 mL  Total OUT: 275 mL    Total NET: 39 mL        Weight (kg): 170.8 (11-01 @ 00:31)  BMI (kg/m2): 54 (11-01 @ 00:31)      PHYSICAL EXAM:  Neuro: Awake, responsive  CV: S1 S2 RRR  Lungs: CTABL  GI: Soft, BS +, ND, NT  Extremities: No edema    	  RADIOLOGY:     < from: CT Angio Chest PE Protocol w/ IV Cont (10.30.24 @ 23:13) >  PROCEDURE:  CT Angiography of the Chest.  Sagittal and coronal reformats were performed as well as 3D (MIP)   reconstructions.    FINDINGS:  Exam mildly limited due to photon starvation from patient body habitus   and suboptimal contrast bolus timing. Within this constraint:    LUNGS AND LARGE AIRWAYS: Patent central airways. Approximately 2.8 cm   peripheral airspace consolidation in the left upper lobe. Right lower   lobe atelectasis. Mosaic perfusion throughout the lungs suggestive of air   trapping. 1.2 cm nodular airspace consolidation in the left lower lobe.  PLEURA: Small right pleural effusion.  VESSELS: Aortic calcifications. Coronary artery calcifications.   Respiratory motion and suboptimal contrast bolus timing limits evaluation   of the distal pulmonary vasculature. Evaluation of the distal segmental   and subsegmental pulmonary arteries is nondiagnostic. Within this   constraint: No evidence of main/proximal segmental pulmonary embolus.  HEART: Cardiomegaly. No pericardial effusion.  MEDIASTINUM AND KANDI: No lymphadenopathy.  CHEST WALL AND LOWER NECK: Within normal limits.  VISUALIZED UPPER ABDOMEN: Eventration of the diaphragm on the right.   Within normal limits.  BONES: Degenerative changes.    IMPRESSION:  Exam limited due to combination of respiratory motion artifact,   suboptimal contrast bolus timing and photon starvation. Within this   constraint:    No evidence of main/proximal segmental pulmonary embolus.    Left upper lobe peripheral airspace consolidation and 1.2 cm nodular   airspace consolidation in the left lower lobe. Differential diagnosis   includes pulmonary infarct, infection, inflammation or less likely   masslike consolidation. Follow-up to resolution recommended.    Small right pleural effusion.      < from: US Duplex Venous Lower Ext Complete, Bilateral (10.31.24 @ 19:43) >  FINDINGS:    Sonographic evaluation of the bilateral common femoral veins, superficial   femoral veins and popliteal veins shows normal flow, compressibility,   respiratory variation and augmentation.  No left calf vein thrombosis. Right calf veins not visualized.    IMPRESSION:    No evidence for acute DVT in the bilateral lower extremity deep venous   systems.      DIAGNOSTIC TESTING:    LABS:	 	    CARDIAC MARKERS:  Troponin I, High Sensitivity Result: 20.2 ng/L (10-31 @ 23:20)  Troponin I, High Sensitivity Result: 20.0 ng/L (10-30 @ 20:49)      01 Nov 2024 06:40    140    |  102    |  22     ----------------------------<  151    4.2     |  29     |  1.01   31 Oct 2024 23:20    141    |  103    |  23     ----------------------------<  118    3.9     |  30     |  1.04   31 Oct 2024 08:45    142    |  105    |  22     ----------------------------<  100    4.1     |  29     |  1.08     Ca    8.8        01 Nov 2024 06:40  Phos  4.5       01 Nov 2024 06:40  Mg     1.6       01 Nov 2024 06:40    TPro  6.7    /  Alb  3.5    /  TBili  1.7    /  DBili  x      /  AST  29     /  ALT  28     /  AlkPhos  56     31 Oct 2024 23:20                          12.9   7.36  )-----------( 194      ( 01 Nov 2024 06:40 )             43.0 ,                       12.4   9.12  )-----------( 198      ( 31 Oct 2024 23:20 )             40.8 ,                       13.1   9.29  )-----------( 215      ( 31 Oct 2024 08:45 )             44.4 ,                       13.6   9.49  )-----------( 212      ( 30 Oct 2024 20:49 )             44.8     proBNP:   Lipid Profile: 10-31 @ 08:45  HDL/Total Cholesterol: --  HDL Chol:              24 mg/dL  Serum Chol:            87 mg/dL  Direct LDL:            --  Triglycerides:         50 mg/dL    HgA1c:   TSH: Thyroid Stimulating Hormone, Serum: 1.580 uIU/mL (10-31 @ 08:45)        INR: 1.59 ratio (10-31 @ 23:20)  INR: 1.39 ratio (10-30 @ 20:49)           68M HTN, ?PVD, gout, p/w AF with RVR. Endorses 2 weeks history of TRINH, orthopnea, PND, associated with b/l LE edema and increased abdominal girth. Initially received Metoprolol 5mg IVP x3 with minimal improvement, then put on Cardizem gtt. On heparin gtt, and received IV Lasix. NTproBNP 2339.    CT-Chest: No evidence of main/proximal segmental pulmonary embolus. Left upper lobe peripheral airspace consolidation and 1.2 cm nodular airspace consolidation in the left lower lobe. Small right pleural effusion.    ECG: AF;  anterior infarct, age undetermined  Tele: AF w RVR      PAST MEDICAL & SURGICAL HISTORY:  Hypertension  Gout  Hypercholesteremia  Morbid obesity  H/O colonoscopy      INTERVAL HISTORY: rate control better overnight on IV cardizem and Metoprolol. TTE read pending.   	  MEDICATIONS:  MEDICATIONS  (STANDING):  allopurinol 300 milliGRAM(s) Oral daily  aspirin  chewable 81 milliGRAM(s) Oral daily  chlorhexidine 2% Cloths 1 Application(s) Topical <User Schedule>  diltiazem Infusion 15 mG/Hr (15 mL/Hr) IV Continuous <Continuous>  furosemide Infusion 10 mG/Hr (5 mL/Hr) IV Continuous <Continuous>  heparin  Infusion. 1200 Unit(s)/Hr (12 mL/Hr) IV Continuous <Continuous>  influenza  Vaccine (HIGH DOSE) 0.5 milliLiter(s) IntraMuscular once  metoprolol tartrate 25 milliGRAM(s) Oral every 12 hours  rosuvastatin 5 milliGRAM(s) Oral at bedtime  spironolactone 25 milliGRAM(s) Oral daily    MEDICATIONS  (PRN):  acetaminophen     Tablet .. 650 milliGRAM(s) Oral every 6 hours PRN Temp greater or equal to 38C (100.4F), Mild Pain (1 - 3)  heparin   Injectable 5000 Unit(s) IV Push every 6 hours PRN For aPTT between 40 - 57  heparin   Injectable 05433 Unit(s) IV Push every 6 hours PRN For aPTT less than 40  melatonin 3 milliGRAM(s) Oral at bedtime PRN Insomnia  ondansetron Injectable 4 milliGRAM(s) IV Push every 8 hours PRN Nausea and/or Vomiting      Vitals:  T(F): 97.7 (11-01-24 @ 08:00), Max: 98.4 (10-31-24 @ 12:09)  HR: 118 (11-01-24 @ 09:00) (108 - 163)  BP: 102/73 (11-01-24 @ 09:00) (102/73 - 129/85)  RR: 24 (11-01-24 @ 09:00) (17 - 26)  SpO2: 93% (11-01-24 @ 09:00) (90% - 100%)  Wt(kg): --    10-31 @ 07:01  -  11-01 @ 07:00  --------------------------------------------------------  IN:    Diltiazem: 60 mL    Diltiazem: 105 mL    Furosemide: 55 mL    Heparin Infusion: 280 mL    Heparin Infusion: 84 mL    IV PiggyBack: 50 mL    Oral Fluid: 848 mL  Total IN: 1482 mL    OUT:    Voided (mL): 2520 mL  Total OUT: 2520 mL    Total NET: -1038 mL      11-01 @ 07:01  -  11-01 @ 09:11  --------------------------------------------------------  IN:    Diltiazem: 30 mL    Furosemide: 10 mL    Heparin Infusion: 24 mL    Oral Fluid: 250 mL  Total IN: 314 mL    OUT:    Indwelling Catheter - Urethral (mL): 275 mL  Total OUT: 275 mL    Total NET: 39 mL        Weight (kg): 170.8 (11-01 @ 00:31)  BMI (kg/m2): 54 (11-01 @ 00:31)      PHYSICAL EXAM:  Neuro: Awake, responsive  CV: S1 S2 RRR  Lungs: CTABL  GI: Soft, BS +, ND, NT  Extremities: No edema    	  RADIOLOGY:     < from: CT Angio Chest PE Protocol w/ IV Cont (10.30.24 @ 23:13) >  PROCEDURE:  CT Angiography of the Chest.  Sagittal and coronal reformats were performed as well as 3D (MIP)   reconstructions.    FINDINGS:  Exam mildly limited due to photon starvation from patient body habitus   and suboptimal contrast bolus timing. Within this constraint:    LUNGS AND LARGE AIRWAYS: Patent central airways. Approximately 2.8 cm   peripheral airspace consolidation in the left upper lobe. Right lower   lobe atelectasis. Mosaic perfusion throughout the lungs suggestive of air   trapping. 1.2 cm nodular airspace consolidation in the left lower lobe.  PLEURA: Small right pleural effusion.  VESSELS: Aortic calcifications. Coronary artery calcifications.   Respiratory motion and suboptimal contrast bolus timing limits evaluation   of the distal pulmonary vasculature. Evaluation of the distal segmental   and subsegmental pulmonary arteries is nondiagnostic. Within this   constraint: No evidence of main/proximal segmental pulmonary embolus.  HEART: Cardiomegaly. No pericardial effusion.  MEDIASTINUM AND KANDI: No lymphadenopathy.  CHEST WALL AND LOWER NECK: Within normal limits.  VISUALIZED UPPER ABDOMEN: Eventration of the diaphragm on the right.   Within normal limits.  BONES: Degenerative changes.    IMPRESSION:  Exam limited due to combination of respiratory motion artifact,   suboptimal contrast bolus timing and photon starvation. Within this   constraint:    No evidence of main/proximal segmental pulmonary embolus.    Left upper lobe peripheral airspace consolidation and 1.2 cm nodular   airspace consolidation in the left lower lobe. Differential diagnosis   includes pulmonary infarct, infection, inflammation or less likely   masslike consolidation. Follow-up to resolution recommended.    Small right pleural effusion.      < from: US Duplex Venous Lower Ext Complete, Bilateral (10.31.24 @ 19:43) >  FINDINGS:    Sonographic evaluation of the bilateral common femoral veins, superficial   femoral veins and popliteal veins shows normal flow, compressibility,   respiratory variation and augmentation.  No left calf vein thrombosis. Right calf veins not visualized.    IMPRESSION:    No evidence for acute DVT in the bilateral lower extremity deep venous   systems.      DIAGNOSTIC TESTING:    LABS:	 	    CARDIAC MARKERS:  Troponin I, High Sensitivity Result: 20.2 ng/L (10-31 @ 23:20)  Troponin I, High Sensitivity Result: 20.0 ng/L (10-30 @ 20:49)      01 Nov 2024 06:40    140    |  102    |  22     ----------------------------<  151    4.2     |  29     |  1.01   31 Oct 2024 23:20    141    |  103    |  23     ----------------------------<  118    3.9     |  30     |  1.04   31 Oct 2024 08:45    142    |  105    |  22     ----------------------------<  100    4.1     |  29     |  1.08     Ca    8.8        01 Nov 2024 06:40  Phos  4.5       01 Nov 2024 06:40  Mg     1.6       01 Nov 2024 06:40    TPro  6.7    /  Alb  3.5    /  TBili  1.7    /  DBili  x      /  AST  29     /  ALT  28     /  AlkPhos  56     31 Oct 2024 23:20                          12.9   7.36  )-----------( 194      ( 01 Nov 2024 06:40 )             43.0 ,                       12.4   9.12  )-----------( 198      ( 31 Oct 2024 23:20 )             40.8 ,                       13.1   9.29  )-----------( 215      ( 31 Oct 2024 08:45 )             44.4 ,                       13.6   9.49  )-----------( 212      ( 30 Oct 2024 20:49 )             44.8     proBNP:   Lipid Profile: 10-31 @ 08:45  HDL/Total Cholesterol: --  HDL Chol:              24 mg/dL  Serum Chol:            87 mg/dL  Direct LDL:            --  Triglycerides:         50 mg/dL    HgA1c:   TSH: Thyroid Stimulating Hormone, Serum: 1.580 uIU/mL (10-31 @ 08:45)        INR: 1.59 ratio (10-31 @ 23:20)  INR: 1.39 ratio (10-30 @ 20:49)           68M HTN, ?PVD, gout, p/w AF with RVR. Endorses 2 weeks history of TRINH, orthopnea, PND, associated with b/l LE edema and increased abdominal girth. Initially received Metoprolol 5mg IVP x3 with minimal improvement, then put on Cardizem gtt. On heparin gtt, and received IV Lasix. NTproBNP 2339.    CT-Chest: No evidence of main/proximal segmental pulmonary embolus. Left upper lobe peripheral airspace consolidation and 1.2 cm nodular airspace consolidation in the left lower lobe. Small right pleural effusion.    ECG: AF;  anterior infarct, age undetermined  Tele: AF w RVR      PAST MEDICAL & SURGICAL HISTORY:  Hypertension  Gout  Hypercholesteremia  Morbid obesity  H/O colonoscopy      INTERVAL HISTORY: rate control better overnight on IV cardizem and Metoprolol. TTE read pending.   	  MEDICATIONS:  MEDICATIONS  (STANDING):  allopurinol 300 milliGRAM(s) Oral daily  aspirin  chewable 81 milliGRAM(s) Oral daily  chlorhexidine 2% Cloths 1 Application(s) Topical <User Schedule>  diltiazem Infusion 15 mG/Hr (15 mL/Hr) IV Continuous <Continuous>  furosemide Infusion 10 mG/Hr (5 mL/Hr) IV Continuous <Continuous>  heparin  Infusion. 1200 Unit(s)/Hr (12 mL/Hr) IV Continuous <Continuous>  influenza  Vaccine (HIGH DOSE) 0.5 milliLiter(s) IntraMuscular once  metoprolol tartrate 25 milliGRAM(s) Oral every 12 hours  rosuvastatin 5 milliGRAM(s) Oral at bedtime  spironolactone 25 milliGRAM(s) Oral daily    MEDICATIONS  (PRN):  acetaminophen     Tablet .. 650 milliGRAM(s) Oral every 6 hours PRN Temp greater or equal to 38C (100.4F), Mild Pain (1 - 3)  heparin   Injectable 5000 Unit(s) IV Push every 6 hours PRN For aPTT between 40 - 57  heparin   Injectable 43333 Unit(s) IV Push every 6 hours PRN For aPTT less than 40  melatonin 3 milliGRAM(s) Oral at bedtime PRN Insomnia  ondansetron Injectable 4 milliGRAM(s) IV Push every 8 hours PRN Nausea and/or Vomiting      Vitals:  T(F): 97.7 (11-01-24 @ 08:00), Max: 98.4 (10-31-24 @ 12:09)  HR: 118 (11-01-24 @ 09:00) (108 - 163)  BP: 102/73 (11-01-24 @ 09:00) (102/73 - 129/85)  RR: 24 (11-01-24 @ 09:00) (17 - 26)  SpO2: 93% (11-01-24 @ 09:00) (90% - 100%)  Wt(kg): --    10-31 @ 07:01  -  11-01 @ 07:00  --------------------------------------------------------  IN:    Diltiazem: 60 mL    Diltiazem: 105 mL    Furosemide: 55 mL    Heparin Infusion: 280 mL    Heparin Infusion: 84 mL    IV PiggyBack: 50 mL    Oral Fluid: 848 mL  Total IN: 1482 mL    OUT:    Voided (mL): 2520 mL  Total OUT: 2520 mL    Total NET: -1038 mL      11-01 @ 07:01  -  11-01 @ 09:11  --------------------------------------------------------  IN:    Diltiazem: 30 mL    Furosemide: 10 mL    Heparin Infusion: 24 mL    Oral Fluid: 250 mL  Total IN: 314 mL    OUT:    Indwelling Catheter - Urethral (mL): 275 mL  Total OUT: 275 mL    Total NET: 39 mL        Weight (kg): 170.8 (11-01 @ 00:31)  BMI (kg/m2): 54 (11-01 @ 00:31)      PHYSICAL EXAM:  General; alert, oriented, in no acute distress, on [place]   CV: S1 S2 Irregular   Lungs: diminished at b/l bases, scant crackles noted on auscultation   GI: soft, Pendulous abdomen,  + BS x 4 quadrants ND, NT  Extremities: RLE with open excoriation/weeping, Left WNL  Neuro: Non focal exam   	  RADIOLOGY:     < from: CT Angio Chest PE Protocol w/ IV Cont (10.30.24 @ 23:13) >  PROCEDURE:  CT Angiography of the Chest.  Sagittal and coronal reformats were performed as well as 3D (MIP)   reconstructions.    FINDINGS:  Exam mildly limited due to photon starvation from patient body habitus   and suboptimal contrast bolus timing. Within this constraint:    LUNGS AND LARGE AIRWAYS: Patent central airways. Approximately 2.8 cm   peripheral airspace consolidation in the left upper lobe. Right lower   lobe atelectasis. Mosaic perfusion throughout the lungs suggestive of air   trapping. 1.2 cm nodular airspace consolidation in the left lower lobe.  PLEURA: Small right pleural effusion.  VESSELS: Aortic calcifications. Coronary artery calcifications.   Respiratory motion and suboptimal contrast bolus timing limits evaluation   of the distal pulmonary vasculature. Evaluation of the distal segmental   and subsegmental pulmonary arteries is nondiagnostic. Within this   constraint: No evidence of main/proximal segmental pulmonary embolus.  HEART: Cardiomegaly. No pericardial effusion.  MEDIASTINUM AND KANDI: No lymphadenopathy.  CHEST WALL AND LOWER NECK: Within normal limits.  VISUALIZED UPPER ABDOMEN: Eventration of the diaphragm on the right.   Within normal limits.  BONES: Degenerative changes.    IMPRESSION:  Exam limited due to combination of respiratory motion artifact,   suboptimal contrast bolus timing and photon starvation. Within this   constraint:    No evidence of main/proximal segmental pulmonary embolus.    Left upper lobe peripheral airspace consolidation and 1.2 cm nodular   airspace consolidation in the left lower lobe. Differential diagnosis   includes pulmonary infarct, infection, inflammation or less likely   masslike consolidation. Follow-up to resolution recommended.    Small right pleural effusion.      < from: US Duplex Venous Lower Ext Complete, Bilateral (10.31.24 @ 19:43) >  FINDINGS:    Sonographic evaluation of the bilateral common femoral veins, superficial   femoral veins and popliteal veins shows normal flow, compressibility,   respiratory variation and augmentation.  No left calf vein thrombosis. Right calf veins not visualized.    IMPRESSION:    No evidence for acute DVT in the bilateral lower extremity deep venous   systems.      DIAGNOSTIC TESTING:    LABS:	 	    CARDIAC MARKERS:  Troponin I, High Sensitivity Result: 20.2 ng/L (10-31 @ 23:20)  Troponin I, High Sensitivity Result: 20.0 ng/L (10-30 @ 20:49)      01 Nov 2024 06:40    140    |  102    |  22     ----------------------------<  151    4.2     |  29     |  1.01   31 Oct 2024 23:20    141    |  103    |  23     ----------------------------<  118    3.9     |  30     |  1.04   31 Oct 2024 08:45    142    |  105    |  22     ----------------------------<  100    4.1     |  29     |  1.08     Ca    8.8        01 Nov 2024 06:40  Phos  4.5       01 Nov 2024 06:40  Mg     1.6       01 Nov 2024 06:40    TPro  6.7    /  Alb  3.5    /  TBili  1.7    /  DBili  x      /  AST  29     /  ALT  28     /  AlkPhos  56     31 Oct 2024 23:20                          12.9   7.36  )-----------( 194      ( 01 Nov 2024 06:40 )             43.0 ,                       12.4   9.12  )-----------( 198      ( 31 Oct 2024 23:20 )             40.8 ,                       13.1   9.29  )-----------( 215      ( 31 Oct 2024 08:45 )             44.4 ,                       13.6   9.49  )-----------( 212      ( 30 Oct 2024 20:49 )             44.8     proBNP:   Lipid Profile: 10-31 @ 08:45  HDL/Total Cholesterol: --  HDL Chol:              24 mg/dL  Serum Chol:            87 mg/dL  Direct LDL:            --  Triglycerides:         50 mg/dL    HgA1c:   TSH: Thyroid Stimulating Hormone, Serum: 1.580 uIU/mL (10-31 @ 08:45)        INR: 1.59 ratio (10-31 @ 23:20)  INR: 1.39 ratio (10-30 @ 20:49)

## 2024-11-01 NOTE — PROGRESS NOTE ADULT - ASSESSMENT
69 yo m pmhx obesity, HTN on metoprolol XL and amlodipine, HLD, PVD and gout admitted with new onset FILI, acute congestive heart failure, acute hypoxic respiratory failure, component of pulmonary edema, b/l LE edema with ulcerations of right leg.     NEURO: No active issues  CV: fili. increase lopressor to 50mg BID. TTE. trend BNP.  RESP: acute hypoxic respiratory failure now on HFNC. titrate as tolerated. Lasix gtt for volume overload. goal net negative.  RENAL: strict I&Os, monitor electrolytes closely given lasix gtt, replace lytes as needed.  GI: Dash/TLC Diet  ENDO: No active issues  ID: observing off abx. bedside POCUS showing no effusions. atelectasis RLL?. low threshold to cover with abx. monitor fever curve/wbc. can send procal in A.M.  HEME: Heparin gtt for ac  DISPO: Full code.  Updated patient at bedside.

## 2024-11-01 NOTE — DIETITIAN INITIAL EVALUATION ADULT - ORAL INTAKE PTA/DIET HISTORY
Pt reports good appetite and PO intake PTA. Reports he mostly follows low salt diet PTA but some times "cheats" and eats what the rest of his family is eating at home.

## 2024-11-01 NOTE — DIETITIAN INITIAL EVALUATION ADULT - PATIENT MEETS CRITERIA FOR MALNUTRITION
Found w/ Bilateral PNA vs Atelectasis, and Possible OM Sacrum   S/p Fevers at home, Afebrile since admission   - S/p Chest CT (10/28):  c/w Bilateral PNA vs Atelectasis   - Started Empirically on Vanco, Aztreonam   - Blood cx: NGTD   - urine culture: negative   - Sputum cx + Pseudomonas aeruginosa, S. aureus  - Airway Clearance: Duoneb, Hypersal, Chest PT, PRN suctioning   - Maintain 02 sat > 92% no

## 2024-11-01 NOTE — DIETITIAN INITIAL EVALUATION ADULT - NUTRITION DIAGNOSIS
67 year old male with h/o lung cancer s/p radiation/ chemo, HTN, GERD, Atrial flutter transferred from [facility] for evaluation and management of SVT.     - evaluated by cards- ct chest with pericardial effusion / rt pleural effusion, echo with moderate effusion, thoracic sx eval   - EP evaluated pt , cont toprol 25 + amio , wean Cardizem , no ablation given atrial flutter triggered by thickened pericardium / pericardial effusion   - no ac given ct finding of ? hemorrhagic effusion - cards aware and no ac as per cards sec to risk of hemorrage  - Evaluated by ID  for leukocytosis - as per ID , observe off abx yes...

## 2024-11-02 LAB
ALBUMIN SERPL ELPH-MCNC: 3.1 G/DL — LOW (ref 3.3–5)
ALBUMIN SERPL ELPH-MCNC: 3.2 G/DL — LOW (ref 3.3–5)
ALP SERPL-CCNC: 55 U/L — SIGNIFICANT CHANGE UP (ref 40–120)
ALP SERPL-CCNC: 59 U/L — SIGNIFICANT CHANGE UP (ref 40–120)
ALT FLD-CCNC: 25 U/L — SIGNIFICANT CHANGE UP (ref 12–78)
ALT FLD-CCNC: 25 U/L — SIGNIFICANT CHANGE UP (ref 12–78)
ANION GAP SERPL CALC-SCNC: 5 MMOL/L — SIGNIFICANT CHANGE UP (ref 5–17)
ANION GAP SERPL CALC-SCNC: 6 MMOL/L — SIGNIFICANT CHANGE UP (ref 5–17)
ANION GAP SERPL CALC-SCNC: 6 MMOL/L — SIGNIFICANT CHANGE UP (ref 5–17)
APTT BLD: 77.6 SEC — HIGH (ref 24.5–35.6)
AST SERPL-CCNC: 23 U/L — SIGNIFICANT CHANGE UP (ref 15–37)
AST SERPL-CCNC: 24 U/L — SIGNIFICANT CHANGE UP (ref 15–37)
BILIRUB SERPL-MCNC: 0.8 MG/DL — SIGNIFICANT CHANGE UP (ref 0.2–1.2)
BILIRUB SERPL-MCNC: 0.9 MG/DL — SIGNIFICANT CHANGE UP (ref 0.2–1.2)
BUN SERPL-MCNC: 28 MG/DL — HIGH (ref 7–23)
BUN SERPL-MCNC: 28 MG/DL — HIGH (ref 7–23)
BUN SERPL-MCNC: 29 MG/DL — HIGH (ref 7–23)
CALCIUM SERPL-MCNC: 8.5 MG/DL — SIGNIFICANT CHANGE UP (ref 8.5–10.1)
CALCIUM SERPL-MCNC: 8.7 MG/DL — SIGNIFICANT CHANGE UP (ref 8.5–10.1)
CALCIUM SERPL-MCNC: 8.7 MG/DL — SIGNIFICANT CHANGE UP (ref 8.5–10.1)
CHLORIDE SERPL-SCNC: 100 MMOL/L — SIGNIFICANT CHANGE UP (ref 96–108)
CHLORIDE SERPL-SCNC: 96 MMOL/L — SIGNIFICANT CHANGE UP (ref 96–108)
CHLORIDE SERPL-SCNC: 98 MMOL/L — SIGNIFICANT CHANGE UP (ref 96–108)
CO2 SERPL-SCNC: 33 MMOL/L — HIGH (ref 22–31)
CO2 SERPL-SCNC: 36 MMOL/L — HIGH (ref 22–31)
CO2 SERPL-SCNC: 38 MMOL/L — HIGH (ref 22–31)
CREAT SERPL-MCNC: 1.05 MG/DL — SIGNIFICANT CHANGE UP (ref 0.5–1.3)
CREAT SERPL-MCNC: 1.06 MG/DL — SIGNIFICANT CHANGE UP (ref 0.5–1.3)
CREAT SERPL-MCNC: 1.19 MG/DL — SIGNIFICANT CHANGE UP (ref 0.5–1.3)
CULTURE RESULTS: SIGNIFICANT CHANGE UP
EGFR: 66 ML/MIN/1.73M2 — SIGNIFICANT CHANGE UP
EGFR: 76 ML/MIN/1.73M2 — SIGNIFICANT CHANGE UP
EGFR: 77 ML/MIN/1.73M2 — SIGNIFICANT CHANGE UP
GLUCOSE SERPL-MCNC: 129 MG/DL — HIGH (ref 70–99)
GLUCOSE SERPL-MCNC: 149 MG/DL — HIGH (ref 70–99)
GLUCOSE SERPL-MCNC: 153 MG/DL — HIGH (ref 70–99)
HCT VFR BLD CALC: 40.6 % — SIGNIFICANT CHANGE UP (ref 39–50)
HGB BLD-MCNC: 12.2 G/DL — LOW (ref 13–17)
MAGNESIUM SERPL-MCNC: 1.7 MG/DL — SIGNIFICANT CHANGE UP (ref 1.6–2.6)
MAGNESIUM SERPL-MCNC: 1.8 MG/DL — SIGNIFICANT CHANGE UP (ref 1.6–2.6)
MAGNESIUM SERPL-MCNC: 1.9 MG/DL — SIGNIFICANT CHANGE UP (ref 1.6–2.6)
MCHC RBC-ENTMCNC: 19.6 PG — LOW (ref 27–34)
MCHC RBC-ENTMCNC: 30 G/DL — LOW (ref 32–36)
MCV RBC AUTO: 65.3 FL — LOW (ref 80–100)
NRBC # BLD: 0 /100 WBCS — SIGNIFICANT CHANGE UP (ref 0–0)
PHOSPHATE SERPL-MCNC: 3.5 MG/DL — SIGNIFICANT CHANGE UP (ref 2.5–4.5)
PHOSPHATE SERPL-MCNC: 3.8 MG/DL — SIGNIFICANT CHANGE UP (ref 2.5–4.5)
PHOSPHATE SERPL-MCNC: 4.7 MG/DL — HIGH (ref 2.5–4.5)
PLATELET # BLD AUTO: 186 K/UL — SIGNIFICANT CHANGE UP (ref 150–400)
POTASSIUM SERPL-MCNC: 3.8 MMOL/L — SIGNIFICANT CHANGE UP (ref 3.5–5.3)
POTASSIUM SERPL-MCNC: 3.8 MMOL/L — SIGNIFICANT CHANGE UP (ref 3.5–5.3)
POTASSIUM SERPL-MCNC: 4.3 MMOL/L — SIGNIFICANT CHANGE UP (ref 3.5–5.3)
POTASSIUM SERPL-SCNC: 3.8 MMOL/L — SIGNIFICANT CHANGE UP (ref 3.5–5.3)
POTASSIUM SERPL-SCNC: 3.8 MMOL/L — SIGNIFICANT CHANGE UP (ref 3.5–5.3)
POTASSIUM SERPL-SCNC: 4.3 MMOL/L — SIGNIFICANT CHANGE UP (ref 3.5–5.3)
PROT SERPL-MCNC: 6.5 GM/DL — SIGNIFICANT CHANGE UP (ref 6–8.3)
PROT SERPL-MCNC: 6.6 GM/DL — SIGNIFICANT CHANGE UP (ref 6–8.3)
RBC # BLD: 6.22 M/UL — HIGH (ref 4.2–5.8)
RBC # FLD: 17.6 % — HIGH (ref 10.3–14.5)
SODIUM SERPL-SCNC: 139 MMOL/L — SIGNIFICANT CHANGE UP (ref 135–145)
SODIUM SERPL-SCNC: 139 MMOL/L — SIGNIFICANT CHANGE UP (ref 135–145)
SODIUM SERPL-SCNC: 140 MMOL/L — SIGNIFICANT CHANGE UP (ref 135–145)
SPECIMEN SOURCE: SIGNIFICANT CHANGE UP
WBC # BLD: 8.63 K/UL — SIGNIFICANT CHANGE UP (ref 3.8–10.5)
WBC # FLD AUTO: 8.63 K/UL — SIGNIFICANT CHANGE UP (ref 3.8–10.5)

## 2024-11-02 PROCEDURE — 99291 CRITICAL CARE FIRST HOUR: CPT

## 2024-11-02 PROCEDURE — 99233 SBSQ HOSP IP/OBS HIGH 50: CPT

## 2024-11-02 RX ORDER — FUROSEMIDE 40 MG
20 TABLET ORAL
Qty: 500 | Refills: 0 | Status: DISCONTINUED | OUTPATIENT
Start: 2024-11-02 | End: 2024-11-02

## 2024-11-02 RX ORDER — MAGNESIUM SULFATE IN 0.9% NACL 2 G/50 ML
1 INTRAVENOUS SOLUTION, PIGGYBACK (ML) INTRAVENOUS ONCE
Refills: 0 | Status: COMPLETED | OUTPATIENT
Start: 2024-11-02 | End: 2024-11-02

## 2024-11-02 RX ORDER — MAGNESIUM SULFATE IN 0.9% NACL 2 G/50 ML
2 INTRAVENOUS SOLUTION, PIGGYBACK (ML) INTRAVENOUS ONCE
Refills: 0 | Status: COMPLETED | OUTPATIENT
Start: 2024-11-02 | End: 2024-11-02

## 2024-11-02 RX ORDER — SENNA 187 MG
2 TABLET ORAL AT BEDTIME
Refills: 0 | Status: DISCONTINUED | OUTPATIENT
Start: 2024-11-02 | End: 2024-11-14

## 2024-11-02 RX ORDER — POLYETHYLENE GLYCOL 3350 17 G/17G
17 POWDER, FOR SOLUTION ORAL ONCE
Refills: 0 | Status: COMPLETED | OUTPATIENT
Start: 2024-11-02 | End: 2024-11-02

## 2024-11-02 RX ORDER — DIGOXIN 250 MCG
125 TABLET ORAL DAILY
Refills: 0 | Status: DISCONTINUED | OUTPATIENT
Start: 2024-11-03 | End: 2024-11-11

## 2024-11-02 RX ORDER — POTASSIUM CHLORIDE 10 MEQ
20 TABLET, EXTENDED RELEASE ORAL ONCE
Refills: 0 | Status: COMPLETED | OUTPATIENT
Start: 2024-11-02 | End: 2024-11-02

## 2024-11-02 RX ADMIN — HEPARIN SODIUM 1200 UNIT(S)/HR: 10000 INJECTION INTRAVENOUS; SUBCUTANEOUS at 05:04

## 2024-11-02 RX ADMIN — POLYETHYLENE GLYCOL 3350 17 GRAM(S): 17 POWDER, FOR SOLUTION ORAL at 14:09

## 2024-11-02 RX ADMIN — Medication 300 MILLIGRAM(S): at 12:06

## 2024-11-02 RX ADMIN — HEPARIN SODIUM 1200 UNIT(S)/HR: 10000 INJECTION INTRAVENOUS; SUBCUTANEOUS at 20:04

## 2024-11-02 RX ADMIN — Medication 1 LOZENGE: at 06:33

## 2024-11-02 RX ADMIN — Medication 10 MG/HR: at 17:33

## 2024-11-02 RX ADMIN — Medication 25 MILLIGRAM(S): at 06:33

## 2024-11-02 RX ADMIN — Medication 81 MILLIGRAM(S): at 12:06

## 2024-11-02 RX ADMIN — Medication 5 MILLIGRAM(S): at 22:09

## 2024-11-02 RX ADMIN — Medication 50 MILLIGRAM(S): at 06:33

## 2024-11-02 RX ADMIN — Medication 50 MILLIGRAM(S): at 13:17

## 2024-11-02 RX ADMIN — Medication 20 MILLIEQUIVALENT(S): at 22:32

## 2024-11-02 RX ADMIN — HEPARIN SODIUM 1200 UNIT(S)/HR: 10000 INJECTION INTRAVENOUS; SUBCUTANEOUS at 17:33

## 2024-11-02 RX ADMIN — Medication 250 MICROGRAM(S): at 01:30

## 2024-11-02 RX ADMIN — Medication 1 APPLICATION(S): at 12:07

## 2024-11-02 RX ADMIN — Medication 100 GRAM(S): at 22:33

## 2024-11-02 RX ADMIN — Medication 250 MICROGRAM(S): at 06:33

## 2024-11-02 RX ADMIN — Medication 6 MILLIGRAM(S): at 22:08

## 2024-11-02 RX ADMIN — Medication 10 MG/HR: at 12:07

## 2024-11-02 RX ADMIN — HEPARIN SODIUM 1200 UNIT(S)/HR: 10000 INJECTION INTRAVENOUS; SUBCUTANEOUS at 07:09

## 2024-11-02 RX ADMIN — Medication 20 MILLIEQUIVALENT(S): at 17:53

## 2024-11-02 RX ADMIN — Medication 25 GRAM(S): at 05:09

## 2024-11-02 RX ADMIN — Medication 25 GRAM(S): at 17:53

## 2024-11-02 RX ADMIN — Medication 50 MILLIGRAM(S): at 22:09

## 2024-11-02 RX ADMIN — CHLORHEXIDINE GLUCONATE 1 APPLICATION(S): 40 SOLUTION TOPICAL at 06:34

## 2024-11-02 NOTE — PROGRESS NOTE ADULT - NS ATTEND AMEND GEN_ALL_CORE FT
Morbid obesity.  AF with RVR.  Decompensated HF with fluid OL.  Echo shows preserved LVEF but decreased RV fnc.  On Lasix gtt at 10 mg/hr.    -Quick wean off Cardizem gtt, given RV dysfunction.  -For rate control, load Digoxin 0.25 mg iv q6 x4, followed by 0.125 mg po qd.  -Increase Lopressor to 50 mg po tid.  -Will need eval for SHILOH.    -Cont Lasix gtt.

## 2024-11-02 NOTE — PROGRESS NOTE ADULT - ASSESSMENT
69 y/o male with a pmhx of Hypertension, Gout, Hypercholesteremia, and Morbid obesity admitted with Decompensated HF with fluid overload, and AF with RVR.  Cardiology consulted for assistance with rate control and diuresis in the setting of new Afib and CHF. Currently on HiFlo 60% FIO2, 50 L per/min with SpO2 94-95%.       -continue Lasix gtt at 10 mg/hr. with spironolactone 25 mg qd.  -Strict I&O, daily weights (huynh cath in situ for ongoing monitoring)   -Replete Mg to 2, and K+ to 4, monitor renal function closely    -Heart rate improved over night on Cardizem gtt, metoprolol increased to 50 mg BID per primary team   - preliminary TTE read with preserved LVEF with significant RV dysfunction, and pulmonary HTN-   ***would recommend transition off IV Cardizem with up titration of BB to Metoprolol 50 mg TID, and Digoxin load 0.25 mg iv q6 x4, followed by 0.125 mg po qd.    -Wean off Hiflo to nasal cannula when able, will need SHILOH evaluation   -recommend PT wound care eval for RLE wound     [facility]   Cardiology Service  [phone]     Acutely decompensated HFpEF with fluid overload.  AF with RVR.  Loaded with Dig. On TID metoprolol.    -Continue Lasix gtt at 10 mg/hr. Strict I&O.  -Wean off HiFlo to nasal cannula when able, will need SHILOH evaluation.

## 2024-11-02 NOTE — PROGRESS NOTE ADULT - SUBJECTIVE AND OBJECTIVE BOX
INTERVAL HPI/OVERNIGHT EVENTS:   HPI:  68-year-old obese male with a PMH of HTN, HLD, PVD, gout sent to the ED from PCP’s office to be evaluated for new onset Afib. Endorses 2 weeks history of TRINH, orthopnea, PND, associated with b/l LE edema and increase abdominal size. Upon evaluation, patient became SOB by just moving in the bed, requires 3L-NC. Denies chest pain, palpitation, headache, dizziness, vision changes, fever, chills, abd pain, N/V/D or nay other acute symptoms. Endorses he has changed his diet drastically since recognized increasing the abdomen size. Upon ED arrival patient was found to be in rapid Afib with HR in 150s. Initially received Metoprolol 5mg IVP x3 with minimal improvement, then cardiology consulted, received Cardizem 25mg IV, then placed in Cardizem gtt, Heparin gtt and Lasix 80mg. Labs remarkable for D-Dimer:224, BNP:2339. Normotensive. Afebrile.   CT-Chest: No evidence of main/proximal segmental pulmonary embolus. Left upper lobe peripheral airspace consolidation and 1.2 cm nodular airspace consolidation in the left lower lobe. Small right pleural effusion.   (31 Oct 2024 04:30)      CENTRAL LINE: [ ] YES [ ] NO  LOCATION:   DATE INSERTED:  REMOVE: [ ] YES [ ] NO  EXPLAIN:    BURCH: [ ] YES [ ] NO    DATE INSERTED:  REMOVE:  [ ] YES [ ] NO  EXPLAIN:    A-LINE:  [ ] YES [ ] NO  LOCATION:   DATE INSERTED:  REMOVE:  [ ] YES [ ] NO  EXPLAIN:    PAST MEDICAL & SURGICAL HISTORY:  Hypertension      Gout      Hypercholesteremia      Morbid obesity      H/O colonoscopy          REVIEW OF SYSTEMS:    Negative ROS aside from HPI/Interval events above.    ICU Vital Signs Last 24 Hrs  T(C): 36.3 (02 Nov 2024 08:00), Max: 37.1 (01 Nov 2024 23:18)  T(F): 97.3 (02 Nov 2024 08:00), Max: 98.7 (01 Nov 2024 23:18)  HR: 86 (02 Nov 2024 10:00) (77 - 110)  BP: 113/72 (02 Nov 2024 10:00) (94/58 - 144/119)  BP(mean): 86 (02 Nov 2024 10:00) (70 - 127)  ABP: --  ABP(mean): --  RR: 20 (02 Nov 2024 10:00) (15 - 26)  SpO2: 92% (02 Nov 2024 10:00) (90% - 97%)    O2 Parameters below as of 02 Nov 2024 08:52  Patient On (Oxygen Delivery Method): nasal cannula, high flow  O2 Flow (L/min): 50  O2 Concentration (%): 55        ABG - ( 31 Oct 2024 23:27 )  pH, Arterial: 7.38  pH, Blood: x     /  pCO2: 56    /  pO2: 87    / HCO3: 33    / Base Excess: 6.3   /  SaO2: 98.0                I&O's Detail    01 Nov 2024 07:01  -  02 Nov 2024 07:00  --------------------------------------------------------  IN:    Diltiazem: 75 mL    Furosemide: 92.5 mL    Furosemide: 45 mL    Heparin Infusion: 288 mL    IV PiggyBack: 150 mL    Oral Fluid: 1350 mL  Total IN: 2000.5 mL    OUT:    Indwelling Catheter - Urethral (mL): 3105 mL  Total OUT: 3105 mL    Total NET: -1104.5 mL      02 Nov 2024 08:01  -  02 Nov 2024 10:28  --------------------------------------------------------  IN:    Furosemide: 15 mL    Heparin Infusion: 36 mL    Oral Fluid: 320 mL  Total IN: 371 mL    OUT:    Indwelling Catheter - Urethral (mL): 1850 mL  Total OUT: 1850 mL    Total NET: -1479 mL      CAPILLARY BLOOD GLUCOSE      PHYSICAL EXAM:    General: obese adult male. resting comfortably in bed.  HEENT: NC/AT, HFNC. 55%/50L  PULM: diminished b/l  CVS: afib  ABD: large, Soft, nondistended, nontender, +bs  EXT: 2+ pitting edema, nontender, RLE with ulcerations  SKIN: Warm   NEURO: Alert, oriented, interactive. no confusion.      LABS:                        12.2   8.63  )-----------( 186      ( 02 Nov 2024 04:15 )             40.6      11-02    139  |  100  |  28[H]  ----------------------------<  153[H]  4.3   |  33[H]  |  1.06    Ca    8.7      02 Nov 2024 04:15  Phos  4.7     11-02  Mg     1.8     11-02    TPro  6.6  /  Alb  3.2[L]  /  TBili  0.9  /  DBili  x   /  AST  24  /  ALT  25  /  AlkPhos  55  11-02    PT/INR - ( 31 Oct 2024 23:20 )   PT: 18.4 sec;   INR: 1.59 ratio         PTT - ( 02 Nov 2024 04:15 )  PTT:77.6 sec  Urinalysis Basic - ( 02 Nov 2024 04:15 )    Color: x / Appearance: x / SG: x / pH: x  Gluc: 153 mg/dL / Ketone: x  / Bili: x / Urobili: x   Blood: x / Protein: x / Nitrite: x   Leuk Esterase: x / RBC: x / WBC x   Sq Epi: x / Non Sq Epi: x / Bacteria: x      Culture Results:   No growth at 24 hours (11-01 @ 02:20)  Culture Results:   No growth at 24 hours (11-01 @ 02:20)

## 2024-11-02 NOTE — PROGRESS NOTE ADULT - SUBJECTIVE AND OBJECTIVE BOX
Was loaded with Dig. Metoprolol increased. HR better controlled.  Still on Lasix gtt.  Good diuresis.    Medications:  acetaminophen     Tablet .. 650 milliGRAM(s) Oral every 6 hours PRN  allopurinol 300 milliGRAM(s) Oral daily  aspirin  chewable 81 milliGRAM(s) Oral daily  chlorhexidine 2% Cloths 1 Application(s) Topical <User Schedule>  collagenase Ointment 1 Application(s) Topical daily  furosemide Infusion 20 mG/Hr IV Continuous <Continuous>  heparin   Injectable 18096 Unit(s) IV Push every 6 hours PRN  heparin   Injectable 5000 Unit(s) IV Push every 6 hours PRN  heparin  Infusion. 1200 Unit(s)/Hr IV Continuous <Continuous>  influenza  Vaccine (HIGH DOSE) 0.5 milliLiter(s) IntraMuscular once  melatonin 6 milliGRAM(s) Oral at bedtime PRN  metoprolol tartrate 50 milliGRAM(s) Oral three times a day  ondansetron Injectable 4 milliGRAM(s) IV Push every 8 hours PRN  rosuvastatin 5 milliGRAM(s) Oral at bedtime  spironolactone 25 milliGRAM(s) Oral daily      Vitals:  Vital Signs Last 24 Hrs  T(C): 36.9 (2024 11:32), Max: 37.1 (2024 23:18)  T(F): 98.4 (2024 11:32), Max: 98.7 (2024 23:18)  HR: 86 (2024 12:00) (77 - 110)  BP: 115/71 (2024 12:00) (94/58 - 144/119)  BP(mean): 84 (2024 12:00) (70 - 127)  RR: 19 (2024 12:11) (15 - 26)  SpO2: 94% (2024 12:11) (90% - 97%)    Parameters below as of 2024 12:11  Patient On (Oxygen Delivery Method): nasal cannula, high flow  O2 Flow (L/min): 50  O2 Concentration (%): 55    Daily     Daily Weight in k.6 (2024 06:00)    I&O's Detail    2024 07:01  -  2024 07:00  --------------------------------------------------------  IN:    Diltiazem: 75 mL    Furosemide: 92.5 mL    Furosemide: 45 mL    Heparin Infusion: 288 mL    IV PiggyBack: 150 mL    Oral Fluid: 1350 mL  Total IN: 2000.5 mL    OUT:    Indwelling Catheter - Urethral (mL): 3105 mL  Total OUT: 3105 mL    Total NET: -1104.5 mL      2024 08:01  -  2024 13:32  --------------------------------------------------------  IN:    Furosemide: 15 mL    Furosemide: 40 mL    Heparin Infusion: 72 mL    Oral Fluid: 320 mL  Total IN: 447 mL    OUT:    Indwelling Catheter - Urethral (mL): 1050 mL  Total OUT: 1050 mL    Total NET: -603 mL          Physical Exam:     General: No distress. Comfortable.  HEENT: EOM intact.  Neck: Neck supple. JVP not elevated. No masses  Chest: Clear to auscultation bilaterally  CV: Normal S1 and S2. No murmurs, rub, or gallops. Radial pulses normal.  Abdomen: Soft, non-distended, non-tender  Skin: No rashes or skin breakdown  Neurology: Alert and oriented times three. Sensation intact  Psych: Affect normal    Labs:                        12.2   8.63  )-----------( 186      ( 2024 04:15 )             40.6         139  |  100  |  28[H]  ----------------------------<  153[H]  4.3   |  33[H]  |  1.06    Ca    8.7      2024 04:15  Phos  4.7       Mg     1.8         TPro  6.6  /  Alb  3.2[L]  /  TBili  0.9  /  DBili  x   /  AST  24  /  ALT  25  /  AlkPhos  55      PT/INR - ( 31 Oct 2024 23:20 )   PT: 18.4 sec;   INR: 1.59 ratio         PTT - ( 2024 04:15 )  PTT:77.6 sec       Was loaded with Dig. Metoprolol increased. HR better controlled.  Still on Lasix gtt.  Good diuresis.  C/o constipation. O/w feels better.    Medications:  acetaminophen     Tablet .. 650 milliGRAM(s) Oral every 6 hours PRN  allopurinol 300 milliGRAM(s) Oral daily  aspirin  chewable 81 milliGRAM(s) Oral daily  chlorhexidine 2% Cloths 1 Application(s) Topical <User Schedule>  collagenase Ointment 1 Application(s) Topical daily  furosemide Infusion 20 mG/Hr IV Continuous <Continuous>  heparin   Injectable 13357 Unit(s) IV Push every 6 hours PRN  heparin   Injectable 5000 Unit(s) IV Push every 6 hours PRN  heparin  Infusion. 1200 Unit(s)/Hr IV Continuous <Continuous>  influenza  Vaccine (HIGH DOSE) 0.5 milliLiter(s) IntraMuscular once  melatonin 6 milliGRAM(s) Oral at bedtime PRN  metoprolol tartrate 50 milliGRAM(s) Oral three times a day  ondansetron Injectable 4 milliGRAM(s) IV Push every 8 hours PRN  rosuvastatin 5 milliGRAM(s) Oral at bedtime  spironolactone 25 milliGRAM(s) Oral daily      Vitals:  Vital Signs Last 24 Hrs  T(C): 36.9 (2024 11:32), Max: 37.1 (2024 23:18)  T(F): 98.4 (2024 11:32), Max: 98.7 (2024 23:18)  HR: 86 (2024 12:00) (77 - 110)  BP: 115/71 (2024 12:00) (94/58 - 144/119)  BP(mean): 84 (2024 12:00) (70 - 127)  RR: 19 (2024 12:11) (15 - 26)  SpO2: 94% (2024 12:11) (90% - 97%)    Parameters below as of 2024 12:11  Patient On (Oxygen Delivery Method): nasal cannula, high flow  O2 Flow (L/min): 50  O2 Concentration (%): 55    Daily     Daily Weight in k.6 (2024 06:00)    I&O's Detail    2024 07:01  -  2024 07:00  --------------------------------------------------------  IN:    Diltiazem: 75 mL    Furosemide: 92.5 mL    Furosemide: 45 mL    Heparin Infusion: 288 mL    IV PiggyBack: 150 mL    Oral Fluid: 1350 mL  Total IN: 2000.5 mL    OUT:    Indwelling Catheter - Urethral (mL): 3105 mL  Total OUT: 3105 mL    Total NET: -1104.5 mL      2024 08:01  -  2024 13:32  --------------------------------------------------------  IN:    Furosemide: 15 mL    Furosemide: 40 mL    Heparin Infusion: 72 mL    Oral Fluid: 320 mL  Total IN: 447 mL    OUT:    Indwelling Catheter - Urethral (mL): 1050 mL  Total OUT: 1050 mL    Total NET: -603 mL          Physical Exam:     General: No distress  Neck: Neck supple. JVP elevated  CV: Irreg irreg, tachy  Abdomen: Soft, non-distended, non-tender  Psych: Affect normal    Labs:                        12.2   8.63  )-----------( 186      ( 2024 04:15 )             40.6     11-02    139  |  100  |  28[H]  ----------------------------<  153[H]  4.3   |  33[H]  |  1.06    Ca    8.7      2024 04:15  Phos  4.7     11-  Mg     1.8         TPro  6.6  /  Alb  3.2[L]  /  TBili  0.9  /  DBili  x   /  AST  24  /  ALT  25  /  AlkPhos  55  11-02    PT/INR - ( 31 Oct 2024 23:20 )   PT: 18.4 sec;   INR: 1.59 ratio         PTT - ( 2024 04:15 )  PTT:77.6 sec

## 2024-11-02 NOTE — PROGRESS NOTE ADULT - ASSESSMENT
69 yo m pmhx obesity, HTN on metoprolol XL and amlodipine, HLD, PVD and gout admitted with new onset JOVAN, acute congestive heart failure, acute hypoxic respiratory failure, component of pulmonary edema, b/l LE edema with ulcerations of right leg.     NEURO: No active issues  CV: rate controlled. Now on digoxin. got loaded yesterday for rapid afib. lopressor 50mg bid. dig daily.  RESP: acute hypoxic respiratory failure now on HFNC. titrate as tolerated. Lasix gtt for volume overload. increase to 20mg. goal net negative. UO slowed down yesterday into overnight.   RENAL: strict I&Os, monitor electrolytes closely given lasix gtt, replace lytes as needed. Creatinine normal/stable.  GI: Dash/TLC Diet  ENDO: No active issues  ID: observing off abx.  HEME: Heparin gtt for ac  DISPO: Full code.  67 yo m pmhx obesity, HTN on metoprolol XL and amlodipine, HLD, PVD and gout admitted with new onset JOVAN, acute congestive heart failure, acute hypoxic respiratory failure, component of pulmonary edema, b/l LE edema with ulcerations of right leg.     NEURO: No active issues  CV: rate controlled. Now on digoxin. got loaded yesterday for rapid afib. lopressor 50mg tid. dig daily.  RESP: acute hypoxic respiratory failure now on HFNC. titrate as tolerated. Lasix gtt for volume overload. increase to 20mg. goal net negative. UO slowed down yesterday into overnight.   RENAL: strict I&Os, monitor electrolytes closely given lasix gtt, replace lytes as needed. Creatinine normal/stable.  GI: Dash/TLC Diet  ENDO: No active issues  ID: observing off abx.  HEME: Heparin gtt for ac  DISPO: Full code.

## 2024-11-03 LAB
ALBUMIN SERPL ELPH-MCNC: 2.9 G/DL — LOW (ref 3.3–5)
ALBUMIN SERPL ELPH-MCNC: 3.1 G/DL — LOW (ref 3.3–5)
ALP SERPL-CCNC: 54 U/L — SIGNIFICANT CHANGE UP (ref 40–120)
ALP SERPL-CCNC: 60 U/L — SIGNIFICANT CHANGE UP (ref 40–120)
ALT FLD-CCNC: 24 U/L — SIGNIFICANT CHANGE UP (ref 12–78)
ALT FLD-CCNC: 26 U/L — SIGNIFICANT CHANGE UP (ref 12–78)
ANION GAP SERPL CALC-SCNC: 4 MMOL/L — LOW (ref 5–17)
ANION GAP SERPL CALC-SCNC: 5 MMOL/L — SIGNIFICANT CHANGE UP (ref 5–17)
ANION GAP SERPL CALC-SCNC: 5 MMOL/L — SIGNIFICANT CHANGE UP (ref 5–17)
APTT BLD: 55.3 SEC — HIGH (ref 24.5–35.6)
APTT BLD: 98.2 SEC — HIGH (ref 24.5–35.6)
AST SERPL-CCNC: 24 U/L — SIGNIFICANT CHANGE UP (ref 15–37)
AST SERPL-CCNC: 27 U/L — SIGNIFICANT CHANGE UP (ref 15–37)
BASE EXCESS BLDA CALC-SCNC: 18.2 MMOL/L — HIGH (ref -2–3)
BILIRUB SERPL-MCNC: 0.8 MG/DL — SIGNIFICANT CHANGE UP (ref 0.2–1.2)
BILIRUB SERPL-MCNC: 0.9 MG/DL — SIGNIFICANT CHANGE UP (ref 0.2–1.2)
BLOOD GAS COMMENTS ARTERIAL: SIGNIFICANT CHANGE UP
BUN SERPL-MCNC: 26 MG/DL — HIGH (ref 7–23)
BUN SERPL-MCNC: 26 MG/DL — HIGH (ref 7–23)
BUN SERPL-MCNC: 27 MG/DL — HIGH (ref 7–23)
CALCIUM SERPL-MCNC: 8.5 MG/DL — SIGNIFICANT CHANGE UP (ref 8.5–10.1)
CALCIUM SERPL-MCNC: 8.5 MG/DL — SIGNIFICANT CHANGE UP (ref 8.5–10.1)
CALCIUM SERPL-MCNC: 8.7 MG/DL — SIGNIFICANT CHANGE UP (ref 8.5–10.1)
CHLORIDE SERPL-SCNC: 94 MMOL/L — LOW (ref 96–108)
CHLORIDE SERPL-SCNC: 95 MMOL/L — LOW (ref 96–108)
CHLORIDE SERPL-SCNC: 97 MMOL/L — SIGNIFICANT CHANGE UP (ref 96–108)
CO2 BLDA-SCNC: 48 MMOL/L — CRITICAL HIGH (ref 19–24)
CO2 SERPL-SCNC: 38 MMOL/L — HIGH (ref 22–31)
CO2 SERPL-SCNC: 38 MMOL/L — HIGH (ref 22–31)
CO2 SERPL-SCNC: 41 MMOL/L — HIGH (ref 22–31)
CREAT SERPL-MCNC: 0.83 MG/DL — SIGNIFICANT CHANGE UP (ref 0.5–1.3)
CREAT SERPL-MCNC: 0.93 MG/DL — SIGNIFICANT CHANGE UP (ref 0.5–1.3)
CREAT SERPL-MCNC: 1 MG/DL — SIGNIFICANT CHANGE UP (ref 0.5–1.3)
EGFR: 81 ML/MIN/1.73M2 — SIGNIFICANT CHANGE UP
EGFR: 89 ML/MIN/1.73M2 — SIGNIFICANT CHANGE UP
EGFR: 95 ML/MIN/1.73M2 — SIGNIFICANT CHANGE UP
GAS PNL BLDA: SIGNIFICANT CHANGE UP
GLUCOSE SERPL-MCNC: 123 MG/DL — HIGH (ref 70–99)
GLUCOSE SERPL-MCNC: 130 MG/DL — HIGH (ref 70–99)
GLUCOSE SERPL-MCNC: 98 MG/DL — SIGNIFICANT CHANGE UP (ref 70–99)
HCO3 BLDA-SCNC: 46 MMOL/L — CRITICAL HIGH (ref 21–28)
HCT VFR BLD CALC: 41.8 % — SIGNIFICANT CHANGE UP (ref 39–50)
HGB BLD-MCNC: 12.7 G/DL — LOW (ref 13–17)
HOROWITZ INDEX BLDA+IHG-RTO: 50 — SIGNIFICANT CHANGE UP
MAGNESIUM SERPL-MCNC: 1.8 MG/DL — SIGNIFICANT CHANGE UP (ref 1.6–2.6)
MAGNESIUM SERPL-MCNC: 1.9 MG/DL — SIGNIFICANT CHANGE UP (ref 1.6–2.6)
MAGNESIUM SERPL-MCNC: 2.1 MG/DL — SIGNIFICANT CHANGE UP (ref 1.6–2.6)
MCHC RBC-ENTMCNC: 19.7 PG — LOW (ref 27–34)
MCHC RBC-ENTMCNC: 30.4 G/DL — LOW (ref 32–36)
MCV RBC AUTO: 64.8 FL — LOW (ref 80–100)
NRBC # BLD: 0 /100 WBCS — SIGNIFICANT CHANGE UP (ref 0–0)
NT-PROBNP SERPL-SCNC: 5436 PG/ML — HIGH (ref 0–125)
PCO2 BLDA: 68 MMHG — HIGH (ref 32–46)
PH BLDA: 7.44 — SIGNIFICANT CHANGE UP (ref 7.35–7.45)
PHOSPHATE SERPL-MCNC: 3.3 MG/DL — SIGNIFICANT CHANGE UP (ref 2.5–4.5)
PHOSPHATE SERPL-MCNC: 3.4 MG/DL — SIGNIFICANT CHANGE UP (ref 2.5–4.5)
PHOSPHATE SERPL-MCNC: 3.5 MG/DL — SIGNIFICANT CHANGE UP (ref 2.5–4.5)
PLATELET # BLD AUTO: 187 K/UL — SIGNIFICANT CHANGE UP (ref 150–400)
PO2 BLDA: 72 MMHG — LOW (ref 83–108)
POTASSIUM SERPL-MCNC: 3.8 MMOL/L — SIGNIFICANT CHANGE UP (ref 3.5–5.3)
POTASSIUM SERPL-MCNC: 4 MMOL/L — SIGNIFICANT CHANGE UP (ref 3.5–5.3)
POTASSIUM SERPL-MCNC: 4.1 MMOL/L — SIGNIFICANT CHANGE UP (ref 3.5–5.3)
POTASSIUM SERPL-SCNC: 3.8 MMOL/L — SIGNIFICANT CHANGE UP (ref 3.5–5.3)
POTASSIUM SERPL-SCNC: 4 MMOL/L — SIGNIFICANT CHANGE UP (ref 3.5–5.3)
POTASSIUM SERPL-SCNC: 4.1 MMOL/L — SIGNIFICANT CHANGE UP (ref 3.5–5.3)
PROT SERPL-MCNC: 6.1 GM/DL — SIGNIFICANT CHANGE UP (ref 6–8.3)
PROT SERPL-MCNC: 6.4 GM/DL — SIGNIFICANT CHANGE UP (ref 6–8.3)
RBC # BLD: 6.45 M/UL — HIGH (ref 4.2–5.8)
RBC # FLD: 17.6 % — HIGH (ref 10.3–14.5)
SAO2 % BLDA: 96.2 % — SIGNIFICANT CHANGE UP (ref 94–98)
SODIUM SERPL-SCNC: 138 MMOL/L — SIGNIFICANT CHANGE UP (ref 135–145)
SODIUM SERPL-SCNC: 139 MMOL/L — SIGNIFICANT CHANGE UP (ref 135–145)
SODIUM SERPL-SCNC: 140 MMOL/L — SIGNIFICANT CHANGE UP (ref 135–145)
WBC # BLD: 11.86 K/UL — HIGH (ref 3.8–10.5)
WBC # FLD AUTO: 11.86 K/UL — HIGH (ref 3.8–10.5)

## 2024-11-03 PROCEDURE — 99291 CRITICAL CARE FIRST HOUR: CPT

## 2024-11-03 PROCEDURE — 71045 X-RAY EXAM CHEST 1 VIEW: CPT | Mod: 26

## 2024-11-03 RX ORDER — MAGNESIUM SULFATE IN 0.9% NACL 2 G/50 ML
2 INTRAVENOUS SOLUTION, PIGGYBACK (ML) INTRAVENOUS ONCE
Refills: 0 | Status: COMPLETED | OUTPATIENT
Start: 2024-11-03 | End: 2024-11-03

## 2024-11-03 RX ORDER — ACETAZOLAMIDE EXTENDED-RELEASE 500 MG/1
500 CAPSULE ORAL ONCE
Refills: 0 | Status: COMPLETED | OUTPATIENT
Start: 2024-11-03 | End: 2024-11-03

## 2024-11-03 RX ORDER — ERYTHROMYCIN 5 MG/G
1 OINTMENT OPHTHALMIC EVERY 8 HOURS
Refills: 0 | Status: DISCONTINUED | OUTPATIENT
Start: 2024-11-03 | End: 2024-11-10

## 2024-11-03 RX ORDER — POTASSIUM CHLORIDE 10 MEQ
20 TABLET, EXTENDED RELEASE ORAL ONCE
Refills: 0 | Status: COMPLETED | OUTPATIENT
Start: 2024-11-03 | End: 2024-11-03

## 2024-11-03 RX ORDER — POLYETHYLENE GLYCOL 3350 17 G/17G
17 POWDER, FOR SOLUTION ORAL DAILY
Refills: 0 | Status: DISCONTINUED | OUTPATIENT
Start: 2024-11-03 | End: 2024-11-14

## 2024-11-03 RX ORDER — GLYCERIN/PROPYLENE GLYCOL 0.6 %-0.6%
1 DROPPERETTE, SINGLE-USE DROP DISPENSER OPHTHALMIC (EYE) EVERY 12 HOURS
Refills: 0 | Status: DISCONTINUED | OUTPATIENT
Start: 2024-11-03 | End: 2024-11-14

## 2024-11-03 RX ORDER — APIXABAN 5 MG/1
5 TABLET, FILM COATED ORAL EVERY 12 HOURS
Refills: 0 | Status: DISCONTINUED | OUTPATIENT
Start: 2024-11-03 | End: 2024-11-14

## 2024-11-03 RX ORDER — ACETAZOLAMIDE EXTENDED-RELEASE 500 MG/1
500 CAPSULE ORAL ONCE
Refills: 0 | Status: DISCONTINUED | OUTPATIENT
Start: 2024-11-03 | End: 2024-11-03

## 2024-11-03 RX ADMIN — Medication 300 MILLIGRAM(S): at 11:00

## 2024-11-03 RX ADMIN — Medication 50 MILLIGRAM(S): at 05:39

## 2024-11-03 RX ADMIN — Medication 1 APPLICATION(S): at 11:01

## 2024-11-03 RX ADMIN — APIXABAN 5 MILLIGRAM(S): 5 TABLET, FILM COATED ORAL at 18:15

## 2024-11-03 RX ADMIN — POLYETHYLENE GLYCOL 3350 17 GRAM(S): 17 POWDER, FOR SOLUTION ORAL at 10:51

## 2024-11-03 RX ADMIN — HEPARIN SODIUM 5000 UNIT(S): 10000 INJECTION INTRAVENOUS; SUBCUTANEOUS at 04:39

## 2024-11-03 RX ADMIN — Medication 650 MILLIGRAM(S): at 09:22

## 2024-11-03 RX ADMIN — HEPARIN SODIUM 1500 UNIT(S)/HR: 10000 INJECTION INTRAVENOUS; SUBCUTANEOUS at 07:10

## 2024-11-03 RX ADMIN — Medication 25 MILLIGRAM(S): at 10:51

## 2024-11-03 RX ADMIN — ERYTHROMYCIN 1 APPLICATION(S): 5 OINTMENT OPHTHALMIC at 14:47

## 2024-11-03 RX ADMIN — Medication 1 DROP(S): at 05:49

## 2024-11-03 RX ADMIN — ACETAZOLAMIDE EXTENDED-RELEASE 500 MILLIGRAM(S): 500 CAPSULE ORAL at 10:51

## 2024-11-03 RX ADMIN — Medication 5 MILLIGRAM(S): at 21:58

## 2024-11-03 RX ADMIN — ERYTHROMYCIN 1 APPLICATION(S): 5 OINTMENT OPHTHALMIC at 22:04

## 2024-11-03 RX ADMIN — HEPARIN SODIUM 1500 UNIT(S)/HR: 10000 INJECTION INTRAVENOUS; SUBCUTANEOUS at 04:34

## 2024-11-03 RX ADMIN — CHLORHEXIDINE GLUCONATE 1 APPLICATION(S): 40 SOLUTION TOPICAL at 05:39

## 2024-11-03 RX ADMIN — Medication 25 GRAM(S): at 18:15

## 2024-11-03 RX ADMIN — ERYTHROMYCIN 1 APPLICATION(S): 5 OINTMENT OPHTHALMIC at 10:50

## 2024-11-03 RX ADMIN — Medication 50 MILLIGRAM(S): at 21:58

## 2024-11-03 RX ADMIN — APIXABAN 5 MILLIGRAM(S): 5 TABLET, FILM COATED ORAL at 11:30

## 2024-11-03 RX ADMIN — Medication 125 MICROGRAM(S): at 05:39

## 2024-11-03 RX ADMIN — Medication 1 DROP(S): at 18:15

## 2024-11-03 RX ADMIN — Medication 2 MILLIGRAM(S): at 22:04

## 2024-11-03 RX ADMIN — Medication 25 GRAM(S): at 05:39

## 2024-11-03 RX ADMIN — Medication 6 MILLIGRAM(S): at 21:59

## 2024-11-03 RX ADMIN — Medication 50 MILLIGRAM(S): at 14:46

## 2024-11-03 RX ADMIN — Medication 650 MILLIGRAM(S): at 09:52

## 2024-11-03 RX ADMIN — Medication 20 MILLIEQUIVALENT(S): at 05:39

## 2024-11-03 RX ADMIN — Medication 1 LOZENGE: at 22:13

## 2024-11-03 RX ADMIN — Medication 81 MILLIGRAM(S): at 11:01

## 2024-11-03 RX ADMIN — Medication 2 TABLET(S): at 21:58

## 2024-11-03 NOTE — CHART NOTE - NSCHARTNOTEFT_GEN_A_CORE
:  KRISHAN [address] dr    Indication:  hypoxemia    PROCEDURE:  [x ] LIMITED ECHO  [x ] LIMITED CHEST  [ ] LIMITED RETROPERITONEAL  [ ] LIMITED ABDOMINAL  [ ] LIMITED DVT  [ ] NEEDLE GUIDANCE VASCULAR  [ ] NEEDLE GUIDANCE THORACENTESIS  [ ] NEEDLE GUIDANCE PARACENTESIS  [ ] NEEDLE GUIDANCE PERICARDIOCENTESIS  [ ] OTHER    FINDINGS:  Chest: scattered B lines bilat, no effusions bilat     ECHO: LV>RV with dilated RV with grossly nl  LV systolic function with no wall motion abnormalities, nor septal bowing/flattening  No pericardial effusion  IVC: plethoric     INTERPRETATION:  lung exam with interstitial pattern  dilated RV but nl LV function    will cont diuresis       images uploaded to [employer]

## 2024-11-03 NOTE — PROGRESS NOTE ADULT - ASSESSMENT
67 yo m pmhx obesity, HTN on metoprolol XL and amlodipine, HLD, PVD and gout admitted with new onset JOVAN, acute congestive heart failure, acute hypoxic respiratory failure, component of pulmonary edema, atelectasis, b/l LE edema with ulcerations of right leg found with RV and grade 3 diastolic heart failure.     NEURO: sleep hygiene  CV: af on digoxin and metoprolol, bumex and spironolactone for diuresis, statin  RESP: actively titrating HFNC settings for spo2 >92%, inh bronchodilators prn  RENAL: Monitor urine output, bun/cr and electrolytes. strict I&Os, aggressive electrolyte repletion.  huynh in place.    GI: Dash/TLC diet, bowel regimen  ENDO: no active issues  ID: Erythromycin ophthalmic ointment for left eye conjunctivitis; collagenase for wound care  HEME: Eliquis for ac  DISPO: full code.      DATE OF ENTRY OF THIS NOTE IS EQUAL TO THE DATE OF SERVICES RENDERED

## 2024-11-03 NOTE — PROGRESS NOTE ADULT - ASSESSMENT
69 yo m pmhx obesity, HTN on metoprolol XL and amlodipine, HLD, PVD and gout admitted with new onset JOVAN, acute congestive heart failure, acute hypoxic respiratory failure, component of pulmonary edema, b/l LE edema with ulcerations of right leg.     NEURO: No active issues  CV: rate controlled. Now on digoxin for rapid afib. lopressor 50mg tid. dig daily.  RESP: acute hypoxic respiratory failure now on HFNC. titrate as tolerated. Lasix gtt d/c overnight for NSVT.  UO improved to -4L overnight on lasix. Will give Diamox and bumex BID. Still overloaded on bedside POCUS IVC/Lung exam.  RENAL: strict I&Os, monitor electrolytes closely given continued diuresis. replace lytes as needed. Creatinine normal/stable. trend BMP.  GI: Dash/TLC Diet  ENDO: No active issues  ID: observing off abx.  HEME: Heparin gtt for ac. will transition to PO.  DISPO: Full code.

## 2024-11-03 NOTE — PROGRESS NOTE ADULT - SUBJECTIVE AND OBJECTIVE BOX
INTERVAL HPI/OVERNIGHT EVENTS:   HPI:  68-year-old obese male with a PMH of HTN, HLD, PVD, gout sent to the ED from PCP’s office to be evaluated for new onset Afib. Endorses 2 weeks history of TRINH, orthopnea, PND, associated with b/l LE edema and increase abdominal size. Upon evaluation, patient became SOB by just moving in the bed, requires 3L-NC. Denies chest pain, palpitation, headache, dizziness, vision changes, fever, chills, abd pain, N/V/D or nay other acute symptoms. Endorses he has changed his diet drastically since recognized increasing the abdomen size. Upon ED arrival patient was found to be in rapid Afib with HR in 150s. Initially received Metoprolol 5mg IVP x3 with minimal improvement, then cardiology consulted, received Cardizem 25mg IV, then placed in Cardizem gtt, Heparin gtt and Lasix 80mg. Labs remarkable for D-Dimer:224, BNP:2339. Normotensive. Afebrile.   CT-Chest: No evidence of main/proximal segmental pulmonary embolus. Left upper lobe peripheral airspace consolidation and 1.2 cm nodular airspace consolidation in the left lower lobe. Small right pleural effusion.   (31 Oct 2024 04:30)      CENTRAL LINE: [ ] YES [ ] NO  LOCATION:   DATE INSERTED:  REMOVE: [ ] YES [ ] NO  EXPLAIN:    BURCH: [ ] YES [ ] NO    DATE INSERTED:  REMOVE:  [ ] YES [ ] NO  EXPLAIN:    A-LINE:  [ ] YES [ ] NO  LOCATION:   DATE INSERTED:  REMOVE:  [ ] YES [ ] NO  EXPLAIN:    PAST MEDICAL & SURGICAL HISTORY:  Hypertension      Gout      Hypercholesteremia      Morbid obesity      H/O colonoscopy          REVIEW OF SYSTEMS:    Negative ROS aside from HPI/Interval events above.    ICU Vital Signs Last 24 Hrs  T(C): 38 (03 Nov 2024 08:31), Max: 38 (03 Nov 2024 08:31)  T(F): 100.4 (03 Nov 2024 08:31), Max: 100.4 (03 Nov 2024 08:31)  HR: 96 (03 Nov 2024 10:00) (76 - 108)  BP: 94/70 (03 Nov 2024 10:00) (92/60 - 118/70)  BP(mean): 78 (03 Nov 2024 10:00) (69 - 91)  ABP: --  ABP(mean): --  RR: 14 (03 Nov 2024 10:00) (14 - 27)  SpO2: 92% (03 Nov 2024 10:00) (90% - 97%)    O2 Parameters below as of 03 Nov 2024 08:14  Patient On (Oxygen Delivery Method): nasal cannula, high flow  O2 Flow (L/min): 50  O2 Concentration (%): 50        ABG - ( 03 Nov 2024 08:37 )  pH, Arterial: 7.44  pH, Blood: x     /  pCO2: 68    /  pO2: 72    / HCO3: 46    / Base Excess: 18.2  /  SaO2: 96.2                I&O's Detail    02 Nov 2024 08:01  -  03 Nov 2024 07:00  --------------------------------------------------------  IN:    Furosemide: 15 mL    Furosemide: 120 mL    Heparin Infusion: 309 mL    IV PiggyBack: 100 mL    Oral Fluid: 420 mL  Total IN: 964 mL    OUT:    Indwelling Catheter - Urethral (mL): 8800 mL  Total OUT: 8800 mL    Total NET: -7836 mL            CAPILLARY BLOOD GLUCOSE            PHYSICAL EXAM:      General: obese adult male. resting comfortably in bed.  HEENT: NC/AT, HFNC. 55%/50L  PULM: diminished b/l  CVS: afib  ABD: large, Soft, nondistended, nontender, +bs  EXT: 2+ pitting edema, nontender, RLE with ulcerations  SKIN: Warm   NEURO: Alert, oriented, interactive. no confusion.      LABS:                        12.7   11.86 )-----------( 187      ( 03 Nov 2024 04:10 )             41.8      11-03    140  |  94[L]  |  26[H]  ----------------------------<  98  3.8   |  41[H]  |  1.00    Ca    8.7      03 Nov 2024 04:10  Phos  3.5     11-03  Mg     1.8     11-03    TPro  6.4  /  Alb  3.1[L]  /  TBili  0.9  /  DBili  x   /  AST  24  /  ALT  26  /  AlkPhos  54  11-03    PTT - ( 03 Nov 2024 04:10 )  PTT:55.3 sec  Urinalysis Basic - ( 03 Nov 2024 04:10 )    Color: x / Appearance: x / SG: x / pH: x  Gluc: 98 mg/dL / Ketone: x  / Bili: x / Urobili: x   Blood: x / Protein: x / Nitrite: x   Leuk Esterase: x / RBC: x / WBC x   Sq Epi: x / Non Sq Epi: x / Bacteria: x      Culture Results:   No growth at 48 Hours (11-01 @ 02:20)  Culture Results:   No growth at 48 Hours (11-01 @ 02:20)  Culture Results:   <10,000 CFU/mL Normal Urogenital [place] (11-01 @ 02:20)

## 2024-11-03 NOTE — PROGRESS NOTE ADULT - SUBJECTIVE AND OBJECTIVE BOX
Patient is a 68y old  Male who presents with a chief complaint of New onset Afib & CHF (03 Nov 2024 10:43)      BRIEF HOSPITAL COURSE:   69 yo m pmhx obesity, HTN on metoprolol XL and amlodipine, HLD, PVD and gout admitted with new onset JOVAN, acute congestive heart failure, acute hypoxic respiratory failure, component of pulmonary edema, b/l LE edema with ulcerations of right leg found with RV and grade 3 diastolic heart failure.     Events last 24 hours:   AF better controlled.  Given Diamox for contraction alkalosis, given Bumex 2mg IVP this evening for further diuresis.  Repeat lytes stable this am. AM labs with new leukocytosis, low grade temps today.  Watch wbc trend and fever curve.  low threshold for abx.  Remains on HFNC 50LPM/50%, discussed importance of incentive spirometry, will discuss getting patient oob to recliner in am with nursing staff    PAST MEDICAL & SURGICAL HISTORY:  Hypertension  Gout  Hypercholesteremia  Morbid obesity  H/O colonoscopy      Allergies  niacin (Other)      FAMILY HISTORY:  Family history of pacemaker (Mother)  FH: diabetes mellitus (Father)      Social History:   from home, , working      Review of Systems:  "My throat hurts"      Physical Examination: Exam limited by body habitus    General: Pleasant obese male, lying in bed, nad    HEENT: NC/AT, HFNC in place    PULM: coarse b/l    CVS: AF    ABD: Soft, nondistended, nontender, +bs    EXT: +edema, RLE with stasis wounds    SKIN: Warm     NEURO: Alert, oriented, interactive, nonfocal      Medications:  buMETAnide Injectable 2 milliGRAM(s) IV Push every 12 hours  digoxin     Tablet 125 MICROGram(s) Oral daily  metoprolol tartrate 50 milliGRAM(s) Oral three times a day  spironolactone 25 milliGRAM(s) Oral daily  acetaminophen     Tablet .. 650 milliGRAM(s) Oral every 6 hours PRN  melatonin 6 milliGRAM(s) Oral at bedtime PRN  ondansetron Injectable 4 milliGRAM(s) IV Push every 8 hours PRN  apixaban 5 milliGRAM(s) Oral every 12 hours  aspirin  chewable 81 milliGRAM(s) Oral daily  polyethylene glycol 3350 17 Gram(s) Oral daily  senna 2 Tablet(s) Oral at bedtime  allopurinol 300 milliGRAM(s) Oral daily  rosuvastatin 5 milliGRAM(s) Oral at bedtime  influenza  Vaccine (HIGH DOSE) 0.5 milliLiter(s) IntraMuscular once  artificial  tears Solution 1 Drop(s) Left EYE every 12 hours  benzocaine/menthol Lozenge 1 Lozenge Oral every 6 hours PRN  chlorhexidine 2% Cloths 1 Application(s) Topical <User Schedule>  collagenase Ointment 1 Application(s) Topical daily  erythromycin   Ointment 1 Application(s) Left EYE every 8 hours      ICU Vital Signs Last 24 Hrs  T(C): 37.2 (03 Nov 2024 23:50), Max: 38 (03 Nov 2024 08:31)  T(F): 98.9 (03 Nov 2024 23:50), Max: 100.4 (03 Nov 2024 08:31)  HR: 94 (04 Nov 2024 00:58) (82 - 115)  BP: 108/73 (04 Nov 2024 00:00) (83/50 - 126/78)  BP(mean): 85 (04 Nov 2024 00:00) (58 - 91)  ABP: --  ABP(mean): --  RR: 22 (04 Nov 2024 00:58) (14 - 26)  SpO2: 93% (04 Nov 2024 00:58) (90% - 95%)    O2 Parameters below as of 04 Nov 2024 00:58  Patient On (Oxygen Delivery Method): nasal cannula, high flow  O2 Flow (L/min): 50  O2 Concentration (%): 50      Vital Signs Last 24 Hrs  T(C): 37.2 (03 Nov 2024 23:50), Max: 38 (03 Nov 2024 08:31)  T(F): 98.9 (03 Nov 2024 23:50), Max: 100.4 (03 Nov 2024 08:31)  HR: 94 (04 Nov 2024 00:58) (82 - 115)  BP: 108/73 (04 Nov 2024 00:00) (83/50 - 126/78)  BP(mean): 85 (04 Nov 2024 00:00) (58 - 91)  RR: 22 (04 Nov 2024 00:58) (14 - 26)  SpO2: 93% (04 Nov 2024 00:58) (90% - 95%)    Parameters below as of 04 Nov 2024 00:58  Patient On (Oxygen Delivery Method): nasal cannula, high flow  O2 Flow (L/min): 50  O2 Concentration (%): 50    ABG - ( 03 Nov 2024 08:37 )  pH, Arterial: 7.44  pH, Blood: x     /  pCO2: 68    /  pO2: 72    / HCO3: 46    / Base Excess: 18.2  /  SaO2: 96.2        I&O's Detail    02 Nov 2024 08:01  -  03 Nov 2024 07:00  --------------------------------------------------------  IN:    Furosemide: 15 mL    Furosemide: 120 mL    Heparin Infusion: 309 mL    IV PiggyBack: 100 mL    Oral Fluid: 420 mL  Total IN: 964 mL    OUT:    Indwelling Catheter - Urethral (mL): 8800 mL  Total OUT: 8800 mL  Total NET: -7836 mL      03 Nov 2024 07:01  -  04 Nov 2024 01:16  --------------------------------------------------------  IN:    Heparin Infusion: 52.5 mL    IV PiggyBack: 50 mL    Oral Fluid: 600 mL  Total IN: 702.5 mL    OUT:    Indwelling Catheter - Urethral (mL): 2750 mL  Total OUT: 2750 mL  Total NET: -2047.5 mL      LABS:                        12.7   11.86 )-----------( 187      ( 03 Nov 2024 04:10 )             41.8     11-03    139  |  97  |  26[H]  ----------------------------<  123[H]  4.1   |  38[H]  |  0.83    Ca    8.5      03 Nov 2024 22:10  Phos  3.4     11-03  Mg     2.1     11-03    TPro  6.1  /  Alb  2.9[L]  /  TBili  0.8  /  DBili  x   /  AST  27  /  ALT  24  /  AlkPhos  60  11-03      PTT - ( 03 Nov 2024 11:00 )  PTT:98.2 sec      Urinalysis Basic - ( 03 Nov 2024 22:10 )  Color: x / Appearance: x / SG: x / pH: x  Gluc: 123 mg/dL / Ketone: x  / Bili: x / Urobili: x   Blood: x / Protein: x / Nitrite: x   Leuk Esterase: x / RBC: x / WBC x   Sq Epi: x / Non Sq Epi: x / Bacteria: x      CULTURES:  Culture Results:   No growth at 48 Hours (11-01 @ 02:20)  Culture Results:   No growth at 48 Hours (11-01 @ 02:20)  Culture Results:   <10,000 CFU/mL Normal Urogenital [place] (11-01 @ 02:20)      RADIOLOGY:   < from: Xray Chest 1 View- PORTABLE-Urgent (Xray Chest 1 View- PORTABLE-Urgent .) (11.01.24 @ 00:06) >    ACC: [id number] EXAM:  XR CHEST PORTABLE URGENT 1V   ORDERED BY: PIETRO TRACEY     PROCEDURE DATE:  11/01/2024      INTERPRETATION:  TIME OF EXAM: November 1, 2024 at 11:26 PM.    CLINICAL INFORMATION: Worsening dyspnea. Acute respiratory distress.    COMPARISON:  AP chest x-ray and CTA of the chest from October 30, 2024.    TECHNIQUE:   AP Portable chest x-ray.    INTERPRETATION:    Heart size and the mediastinum cannot be accurately evaluated on this   projection.  Persistently elevated right hemidiaphragm.  Continued right basilar opacity with poor definition of the right   hemidiaphragm.  Approximate 2.8 cm left midlung masslike opacity again seen.  No right pleural effusion.  No pneumothorax.  There is osteoarthritic degenerative change of the spine.    IMPRESSION:  Persistently elevated right hemidiaphragm.    Continued right basilar opacity, likely a right pleural effusion with   associated passive atelectasis noted on the CT scan.    Left midlung masslike opacity, of uncertain etiology, again seen.    --- End of Report ---    RYAN JACK MD; Attending Radiologist  This document has been electronically signed. Nov 2 2024 12:53PM    < end of copied text >

## 2024-11-04 LAB
ALBUMIN SERPL ELPH-MCNC: 2.9 G/DL — LOW (ref 3.3–5)
ALP SERPL-CCNC: 55 U/L — SIGNIFICANT CHANGE UP (ref 40–120)
ALT FLD-CCNC: 25 U/L — SIGNIFICANT CHANGE UP (ref 12–78)
ANION GAP SERPL CALC-SCNC: 6 MMOL/L — SIGNIFICANT CHANGE UP (ref 5–17)
ANION GAP SERPL CALC-SCNC: 7 MMOL/L — SIGNIFICANT CHANGE UP (ref 5–17)
AST SERPL-CCNC: 28 U/L — SIGNIFICANT CHANGE UP (ref 15–37)
BILIRUB SERPL-MCNC: 0.9 MG/DL — SIGNIFICANT CHANGE UP (ref 0.2–1.2)
BUN SERPL-MCNC: 24 MG/DL — HIGH (ref 7–23)
BUN SERPL-MCNC: 25 MG/DL — HIGH (ref 7–23)
CALCIUM SERPL-MCNC: 8.8 MG/DL — SIGNIFICANT CHANGE UP (ref 8.5–10.1)
CALCIUM SERPL-MCNC: 8.9 MG/DL — SIGNIFICANT CHANGE UP (ref 8.5–10.1)
CHLORIDE SERPL-SCNC: 95 MMOL/L — LOW (ref 96–108)
CHLORIDE SERPL-SCNC: 96 MMOL/L — SIGNIFICANT CHANGE UP (ref 96–108)
CO2 SERPL-SCNC: 36 MMOL/L — HIGH (ref 22–31)
CO2 SERPL-SCNC: 38 MMOL/L — HIGH (ref 22–31)
CREAT SERPL-MCNC: 0.78 MG/DL — SIGNIFICANT CHANGE UP (ref 0.5–1.3)
CREAT SERPL-MCNC: 0.79 MG/DL — SIGNIFICANT CHANGE UP (ref 0.5–1.3)
DIGOXIN SERPL-MCNC: 1.12 NG/ML — SIGNIFICANT CHANGE UP (ref 0.8–2)
EGFR: 96 ML/MIN/1.73M2 — SIGNIFICANT CHANGE UP
EGFR: 97 ML/MIN/1.73M2 — SIGNIFICANT CHANGE UP
GLUCOSE SERPL-MCNC: 108 MG/DL — HIGH (ref 70–99)
GLUCOSE SERPL-MCNC: 111 MG/DL — HIGH (ref 70–99)
HCT VFR BLD CALC: 43.1 % — SIGNIFICANT CHANGE UP (ref 39–50)
HGB BLD-MCNC: 13.1 G/DL — SIGNIFICANT CHANGE UP (ref 13–17)
MAGNESIUM SERPL-MCNC: 1.8 MG/DL — SIGNIFICANT CHANGE UP (ref 1.6–2.6)
MAGNESIUM SERPL-MCNC: 1.9 MG/DL — SIGNIFICANT CHANGE UP (ref 1.6–2.6)
MCHC RBC-ENTMCNC: 19.7 PG — LOW (ref 27–34)
MCHC RBC-ENTMCNC: 30.4 G/DL — LOW (ref 32–36)
MCV RBC AUTO: 64.7 FL — LOW (ref 80–100)
NRBC # BLD: 0 /100 WBCS — SIGNIFICANT CHANGE UP (ref 0–0)
PHOSPHATE SERPL-MCNC: 3.6 MG/DL — SIGNIFICANT CHANGE UP (ref 2.5–4.5)
PHOSPHATE SERPL-MCNC: 3.6 MG/DL — SIGNIFICANT CHANGE UP (ref 2.5–4.5)
PLATELET # BLD AUTO: 185 K/UL — SIGNIFICANT CHANGE UP (ref 150–400)
POTASSIUM SERPL-MCNC: 4 MMOL/L — SIGNIFICANT CHANGE UP (ref 3.5–5.3)
POTASSIUM SERPL-MCNC: 4.1 MMOL/L — SIGNIFICANT CHANGE UP (ref 3.5–5.3)
POTASSIUM SERPL-SCNC: 4 MMOL/L — SIGNIFICANT CHANGE UP (ref 3.5–5.3)
POTASSIUM SERPL-SCNC: 4.1 MMOL/L — SIGNIFICANT CHANGE UP (ref 3.5–5.3)
PROT SERPL-MCNC: 6.4 GM/DL — SIGNIFICANT CHANGE UP (ref 6–8.3)
RBC # BLD: 6.66 M/UL — HIGH (ref 4.2–5.8)
RBC # FLD: 17.6 % — HIGH (ref 10.3–14.5)
SODIUM SERPL-SCNC: 139 MMOL/L — SIGNIFICANT CHANGE UP (ref 135–145)
SODIUM SERPL-SCNC: 139 MMOL/L — SIGNIFICANT CHANGE UP (ref 135–145)
WBC # BLD: 11.7 K/UL — HIGH (ref 3.8–10.5)
WBC # FLD AUTO: 11.7 K/UL — HIGH (ref 3.8–10.5)

## 2024-11-04 PROCEDURE — 99233 SBSQ HOSP IP/OBS HIGH 50: CPT

## 2024-11-04 PROCEDURE — 99291 CRITICAL CARE FIRST HOUR: CPT

## 2024-11-04 RX ORDER — METOPROLOL TARTRATE 50 MG
150 TABLET ORAL DAILY
Refills: 0 | Status: DISCONTINUED | OUTPATIENT
Start: 2024-11-05 | End: 2024-11-11

## 2024-11-04 RX ORDER — MAGNESIUM SULFATE IN 0.9% NACL 2 G/50 ML
2 INTRAVENOUS SOLUTION, PIGGYBACK (ML) INTRAVENOUS ONCE
Refills: 0 | Status: COMPLETED | OUTPATIENT
Start: 2024-11-04 | End: 2024-11-04

## 2024-11-04 RX ORDER — METOPROLOL TARTRATE 50 MG
50 TABLET ORAL ONCE
Refills: 0 | Status: COMPLETED | OUTPATIENT
Start: 2024-11-04 | End: 2024-11-04

## 2024-11-04 RX ADMIN — Medication 50 MILLIGRAM(S): at 05:46

## 2024-11-04 RX ADMIN — Medication 1 DROP(S): at 15:40

## 2024-11-04 RX ADMIN — Medication 300 MILLIGRAM(S): at 15:40

## 2024-11-04 RX ADMIN — ERYTHROMYCIN 1 APPLICATION(S): 5 OINTMENT OPHTHALMIC at 21:42

## 2024-11-04 RX ADMIN — Medication 50 MILLIGRAM(S): at 15:39

## 2024-11-04 RX ADMIN — Medication 25 GRAM(S): at 23:30

## 2024-11-04 RX ADMIN — CHLORHEXIDINE GLUCONATE 1 APPLICATION(S): 40 SOLUTION TOPICAL at 05:47

## 2024-11-04 RX ADMIN — Medication 6 MILLIGRAM(S): at 21:40

## 2024-11-04 RX ADMIN — Medication 1 APPLICATION(S): at 11:36

## 2024-11-04 RX ADMIN — Medication 650 MILLIGRAM(S): at 05:49

## 2024-11-04 RX ADMIN — Medication 81 MILLIGRAM(S): at 11:36

## 2024-11-04 RX ADMIN — ERYTHROMYCIN 1 APPLICATION(S): 5 OINTMENT OPHTHALMIC at 05:47

## 2024-11-04 RX ADMIN — ERYTHROMYCIN 1 APPLICATION(S): 5 OINTMENT OPHTHALMIC at 15:40

## 2024-11-04 RX ADMIN — Medication 132 MILLIGRAM(S): at 23:29

## 2024-11-04 RX ADMIN — Medication 2 MILLIGRAM(S): at 11:35

## 2024-11-04 RX ADMIN — Medication 5 MILLIGRAM(S): at 21:40

## 2024-11-04 RX ADMIN — Medication 25 MILLIGRAM(S): at 05:47

## 2024-11-04 RX ADMIN — Medication 650 MILLIGRAM(S): at 07:02

## 2024-11-04 RX ADMIN — APIXABAN 5 MILLIGRAM(S): 5 TABLET, FILM COATED ORAL at 05:46

## 2024-11-04 RX ADMIN — Medication 1 DROP(S): at 05:47

## 2024-11-04 RX ADMIN — Medication 50 MILLIGRAM(S): at 21:40

## 2024-11-04 RX ADMIN — APIXABAN 5 MILLIGRAM(S): 5 TABLET, FILM COATED ORAL at 16:31

## 2024-11-04 RX ADMIN — Medication 125 MICROGRAM(S): at 05:47

## 2024-11-04 NOTE — PHYSICAL THERAPY INITIAL EVALUATION ADULT - ADDITIONAL COMMENTS
Patient lives c wife and daughter in a pvt house c 4 steps to enter c L rail up, all amenities on 1st floor. Independent c all ADL's and ambulation without device. Pt has own cane.
pt lives with spouse in a private home with 4 steps to enter has left HR, no steps inside, pt PLOF is indep in ADL, transfers and ambulation w/o AD, pt encountered in bed supine, on cardiac monitor and 60% NCO2, pt obese and require min a for bed mob and transfers, pt able to amb with RW x 4ft, unable to amb further sec to edi LE MS and pt connected to high flow nco2.  pt transferred to recliner chair and left comfortably.

## 2024-11-04 NOTE — PROGRESS NOTE ADULT - SUBJECTIVE AND OBJECTIVE BOX
INTERVAL HPI/OVERNIGHT EVENTS:    Increased FiO2 overnight, remains on hiflow nasal cannula.      Patient noted to have net neg -3450.   Daily Weights  11/4 165.5  11/3 169.7  11/2 171.6    CENTRAL LINE: [ ] YES [x ] NO  LOCATION:       BURCH: [x ] YES [ ] NO        A-LINE:  [ ] YES [ x] NO  LOCATION:       GLOBAL ISSUE/BEST PRACTICE:  Analgesia:   Sedation:  HOB elevation: yes  Stress ulcer prophylaxis:   VTE prophylaxis: apixaban 5 milliGRAM(s) Oral every 12 hours  aspirin  chewable 81 milliGRAM(s) Oral daily  Oral Care: Chlorhexidine  Glycemic control:   Nutrition:Diet, DASH/TLC:   Sodium & Cholesterol Restricted (10-31-24 @ 01:39) [Active]    REVIEW OF SYSTEMS:  [x] All other systems negative    CONSTITUTIONAL: No fever, weight loss, or fatigue  EYES: No eye pain, visual disturbances, or discharge  ENMT:  No difficulty hearing, tinnitus, vertigo; No sinus or throat pain  NECK: No pain or stiffness  RESPIRATORY: No cough, wheezing, chills or hemoptysis; No shortness of breath  CARDIOVASCULAR: No chest pain, palpitations, dizziness, or leg swelling  GASTROINTESTINAL: No abdominal or epigastric pain. No nausea, vomiting, or hematemesis; No diarrhea or constipation. No melena or hematochezia.  GENITOURINARY: No dysuria, frequency, hematuria, or incontinence  NEUROLOGICAL: No headaches, memory loss, loss of strength, numbness, or tremors  SKIN: No itching, burning, rashes, or lesions     PHYSICAL EXAM:    GENERAL: Obese, on high flow nasal cannula,   HEENT: NCAT, EOMI, PERRLA, conjunctiva and sclera clear; Moist mucous membranes  NECK: Supple  CHEST/LUNG: CTABL; No rales, rhonchi, wheezing, or rubs  HEART: S1S2, RRR, No murmurs, rubs, or gallops  ABDOMEN: Soft, obese, Nontender, Nondistended; Bowel sounds present  EXTREMITIES:  2+ Peripheral Pulses, 3+edema  NERVOUS SYSTEM:  Alert & Oriented X3, Good concentration; Motor Strength 5/5 B/L upper and lower extremities;    ICU Vital Signs Last 24 Hrs  T(C): 36.4 (04 Nov 2024 04:00), Max: 37.3 (03 Nov 2024 15:37)  T(F): 97.5 (04 Nov 2024 04:00), Max: 99.1 (03 Nov 2024 15:37)  HR: 80 (04 Nov 2024 09:00) (80 - 115)  BP: 92/64 (04 Nov 2024 09:00) (83/50 - 126/78)  BP(mean): 74 (04 Nov 2024 09:00) (58 - 91)  ABP: --  ABP(mean): --  RR: 18 (04 Nov 2024 09:00) (16 - 26)  SpO2: 91% (04 Nov 2024 09:00) (89% - 95%)    O2 Parameters below as of 04 Nov 2024 08:15  Patient On (Oxygen Delivery Method): nasal cannula, high flow  O2 Flow (L/min): 50  O2 Concentration (%): 60      I&O's Detail    03 Nov 2024 07:01  -  04 Nov 2024 07:00  --------------------------------------------------------  IN:    Heparin Infusion: 52.5 mL    IV PiggyBack: 50 mL    Oral Fluid: 600 mL  Total IN: 702.5 mL    OUT:    Indwelling Catheter - Urethral (mL): 4650 mL  Total OUT: 4650 mL    Total NET: -3947.5 mL    MEDICATIONS  NEURO  Meds: acetaminophen     Tablet .. 650 milliGRAM(s) Oral every 6 hours PRN Temp greater or equal to 38C (100.4F), Mild Pain (1 - 3)  melatonin 6 milliGRAM(s) Oral at bedtime PRN Insomnia  ondansetron Injectable 4 milliGRAM(s) IV Push every 8 hours PRN Nausea and/or Vomiting    RESPIRATORY  ABG - ( 03 Nov 2024 08:37 )  pH: 7.44  /  pCO2: 68    /  pO2: 72    / HCO3: 46    / Base Excess: 18.2  /  SaO2: 96.2    Lactate: x                Meds:   CARDIOVASCULAR  Meds: buMETAnide Injectable 2 milliGRAM(s) IV Push every 12 hours  digoxin     Tablet 125 MICROGram(s) Oral daily  metoprolol tartrate 50 milliGRAM(s) Oral three times a day  spironolactone 25 milliGRAM(s) Oral daily    GI/NUTRITION  Meds: polyethylene glycol 3350 17 Gram(s) Oral daily  senna 2 Tablet(s) Oral at bedtime    GENITOURINARY  Meds:   HEMATOLOGIC  Meds: apixaban 5 milliGRAM(s) Oral every 12 hours  aspirin  chewable 81 milliGRAM(s) Oral daily    [x] VTE Prophylaxis  INFECTIOUS DISEASES  Meds: influenza  Vaccine (HIGH DOSE) 0.5 milliLiter(s) IntraMuscular once    ENDOCRINE  CAPILLARY BLOOD GLUCOSE        Meds:   OTHER MEDICATIONS:  artificial  tears Solution 1 Drop(s) Left EYE every 12 hours  benzocaine/menthol Lozenge 1 Lozenge Oral every 6 hours PRN  chlorhexidine 2% Cloths 1 Application(s) Topical <User Schedule>  collagenase Ointment 1 Application(s) Topical daily  erythromycin   Ointment 1 Application(s) Left EYE every 8 hours  :    LABS:                        13.1   11.70 )-----------( 185      ( 04 Nov 2024 05:50 )             43.1      11-04    139  |  96  |  25[H]  ----------------------------<  108[H]  4.1   |  36[H]  |  0.79    Ca    8.8      04 Nov 2024 05:50  Phos  3.6     11-04  Mg     1.9     11-04    TPro  6.4  /  Alb  2.9[L]  /  TBili  0.9  /  DBili  x   /  AST  28  /  ALT  25  /  AlkPhos  55  11-04    PTT - ( 03 Nov 2024 11:00 )  PTT:98.2 sec  Urinalysis Basic - ( 04 Nov 2024 05:50 )    Color: x / Appearance: x / SG: x / pH: x  Gluc: 108 mg/dL / Ketone: x  / Bili: x / Urobili: x   Blood: x / Protein: x / Nitrite: x   Leuk Esterase: x / RBC: x / WBC x   Sq Epi: x / Non Sq Epi: x / Bacteria: x                RADIOLOGY & ADDITIONAL STUDIES:

## 2024-11-04 NOTE — PHYSICAL THERAPY INITIAL EVALUATION ADULT - PERTINENT HX OF CURRENT PROBLEM, REHAB EVAL
68-year-old obese male with a PMH of HTN, HLD, PVD, gout sent to the ED from PCP’s office to be evaluated for new onset Afib. Endorses 2 weeks history of TRINH, orthopnea, PND, associated with b/l LE edema and increase abdominal size. Upon evaluation, patient became SOB by just moving in the bed, requires 3L-NC. Denies chest pain, palpitation, headache, dizziness, vision changes, fever, chills, abd pain, N/V/D or nay other acute symptoms. Endorses he has changed his diet drastically since recognized increasing the abdomen size. Upon ED arrival patient was found to be in rapid Afib with HR in 150s. Initially received Metoprolol 5mg IVP x3 with minimal improvement, then cardiology consulted, received Cardizem 25mg IV, then placed in Cardizem gtt, Heparin gtt and Lasix 80mg. Labs remarkable for D-Dimer:224, BNP:2339. Normotensive. Afebrile.
pt admitted for acute CHF and rapid A-Fib,

## 2024-11-04 NOTE — PHYSICAL THERAPY INITIAL EVALUATION ADULT - GENERAL OBSERVATIONS, REHAB EVAL
Chart (EMR) reviewed. Spoke to CAMILA Moran and stated that patient is not medically clear for PT services at this time. Pt seen for Wound Care Assessment. Received supine c HOB elevated, NAD. +O2 via HFNC 65% 50Lpm, +telemetry, +IV intact, +huynh cath. Alert. Ox4. Able to follow multistep commands.
pt encountered in bed supine, in ICU unit, pt obese, + edi LE  mod edema

## 2024-11-04 NOTE — PROGRESS NOTE ADULT - ASSESSMENT
67 yo m pmhx obesity, HTN on metoprolol XL and amlodipine, HLD, PVD and gout admitted with new onset JOVAN, acute congestive heart failure, acute hypoxic respiratory failure, component of pulmonary edema, b/l LE edema with ulcerations of right leg.     NEURO: No active issues  CV: Atrial fibrillation, now rate controlled with digoxin.    - Continue with Lopressor 50mg tid and digoxin daily.  RESP: Acute hypoxic respiratory failure now on HFNC. Titrate as tolerated.   - continues to diurese well on diamox and bumex BID  - Will continue with Diamox and bumex BID.   - Still overloaded on bedside POCUS IVC/Lung exam.  RENAL: Continue diuresis; strict I&Os, monitor electrolytes. replace lytes as needed. Creatinine normal/stable. trend BMP.  - Noted daily weight trend and net neg I/Os.  GI: Dash/TLC Diet  ENDO: No active issues  ID: observing off abx.  HEME: C/w eliquis fo afib  DISPO: Full code.

## 2024-11-04 NOTE — PROGRESS NOTE ADULT - NS ATTEND AMEND GEN_ALL_CORE FT
Acutely decompensated HFpEF with fluid overload.  AF with RVR. Now better controlled.  Loaded with Dig. Level 1.1. On TID metoprolol.    Excellent diuresis. Still has edema.    -Increase Bumex to 4 mg iv bid.  -Change Lopressor to Toprol 150 mg po qd.  -Wean off HiFlo to nasal cannula when able, will need SHILOH evaluation.  -Should be seen by an EP dr after d/c for consideration of JOSUÉ/DCCV. Cont apixaban. Acutely decompensated HFpEF with fluid overload.  AF with RVR. Now better controlled.  Loaded with Dig. Level 1.1. On TID metoprolol.    Excellent diuresis. Still has edema.    -Increase Bumex to 4 mg iv bid.  -Change Lopressor to Toprol 150 mg po qd.  -Wean off HiFlo to nasal cannula when able, will need SHILOH evaluation.  -Should be seen by an EP dr after d/c for consideration of JOSUÉ/DCCV. Cont apixaban.  -May start on losartan before d/c.

## 2024-11-04 NOTE — PROGRESS NOTE ADULT - ASSESSMENT
67 y/o male with a pmhx of Hypertension, Gout, Hypercholesteremia, and Morbid obesity admitted with Decompensated HF with fluid overload, and AF with RVR.    TTE with EF 45-50%,  grade III DD,  Enlarged right ventricular cavity size and reduced right ventricular systolic function. mod TR  off lasix drip and cardizem drip, HR better controlled now  BNP 5400    -currently on Bumex 2 mg iv bid, aldactone 25 mg daily   -Strict I&O, daily weights   -s/p Diamox dosing 11/3 for elevated bicarb   -Replete Mg to 2, and K+ to 4, monitor renal function closely  -on metoprolol 50 mg tid, dig .125mg po daily  -Currently on HiFlo 60% FIO2, 50 L per/min   -increase activity as tolerated, PT      69 y/o male with a pmhx of Hypertension, Gout, Hypercholesteremia, and Morbid obesity admitted with Decompensated HF with fluid overload, and AF with RVR.    TTE with EF 45-50%,  grade III DD,  Enlarged right ventricular cavity size and reduced right ventricular systolic function. mod TR  off lasix drip and cardizem drip, HR better controlled now  BNP 5400    -currently on Bumex 2 mg iv bid, aldactone 25 mg daily   -Strict I&O, daily weights   -s/p Diamox dosing 11/3 for elevated bicarb   -Replete Mg to 2, and K+ to 4, monitor renal function closely  -on metoprolol 50 mg tid, dig .125mg po daily, Dig level 1.12  -Currently on HiFlo 60% FIO2, 50 L per/min   -increase activity as tolerated, PT

## 2024-11-04 NOTE — PHYSICAL THERAPY INITIAL EVALUATION ADULT - NSPTDISCHREC_GEN_A_CORE
Medical hold for functional assessment at this time. Wound care recommendations made.
Sub-acute Rehab

## 2024-11-04 NOTE — PROGRESS NOTE ADULT - SUBJECTIVE AND OBJECTIVE BOX
Patient is a 68y old  Male who presents with New onset Afib     PAST MEDICAL & SURGICAL HISTORY:  Hypertension    Gout    Hypercholesteremia    PVD    Morbid obesity    INTERVAL HISTORY: mild dyspnea, denies any chest pain or palpitation   	  MEDICATIONS:  MEDICATIONS  (STANDING):  allopurinol 300 milliGRAM(s) Oral daily  apixaban 5 milliGRAM(s) Oral every 12 hours  artificial  tears Solution 1 Drop(s) Left EYE every 12 hours  aspirin  chewable 81 milliGRAM(s) Oral daily  buMETAnide Injectable 2 milliGRAM(s) IV Push every 12 hours  chlorhexidine 2% Cloths 1 Application(s) Topical <User Schedule>  collagenase Ointment 1 Application(s) Topical daily  digoxin     Tablet 125 MICROGram(s) Oral daily  erythromycin   Ointment 1 Application(s) Left EYE every 8 hours  influenza  Vaccine (HIGH DOSE) 0.5 milliLiter(s) IntraMuscular once  metoprolol tartrate 50 milliGRAM(s) Oral three times a day  polyethylene glycol 3350 17 Gram(s) Oral daily  rosuvastatin 5 milliGRAM(s) Oral at bedtime  senna 2 Tablet(s) Oral at bedtime  spironolactone 25 milliGRAM(s) Oral daily    MEDICATIONS  (PRN):  acetaminophen     Tablet .. 650 milliGRAM(s) Oral every 6 hours PRN Temp greater or equal to 38C (100.4F), Mild Pain (1 - 3)  benzocaine/menthol Lozenge 1 Lozenge Oral every 6 hours PRN Sore Throat  melatonin 6 milliGRAM(s) Oral at bedtime PRN Insomnia  ondansetron Injectable 4 milliGRAM(s) IV Push every 8 hours PRN Nausea and/or Vomiting    Vitals:  T(F): 97.5 (11-04-24 @ 04:00), Max: 99.1 (11-03-24 @ 15:37)  HR: 98 (11-04-24 @ 13:00) (79 - 108)  BP: 105/66 (11-04-24 @ 13:00) (83/50 - 126/78)  RR: 20 (11-04-24 @ 13:00) (12 - 26)  SpO2: 88% (11-04-24 @ 13:00) (88% - 95%)    11-03 @ 07:01  -  11-04 @ 07:00  --------------------------------------------------------  IN:    Heparin Infusion: 52.5 mL    IV PiggyBack: 50 mL    Oral Fluid: 600 mL  Total IN: 702.5 mL    OUT:    Indwelling Catheter - Urethral (mL): 4650 mL  Total OUT: 4650 mL    Total NET: -3947.5 mL    PHYSICAL EXAM:  Neuro: Awake, responsive  CV: S1 S2 irreg irregular   Lungs: diminished to bases   GI: Soft, BS +, ND, NT  Extremities: +LE edema    TELEMETRY: afib    RADIOLOGY: < from: Xray Chest 1 View- PORTABLE-Urgent (Xray Chest 1 View- PORTABLE-Urgent .) (11.01.24 @ 00:06) >    IMPRESSION:  Persistently elevated right hemidiaphragm.    Continued right basilar opacity, likely a right pleural effusion with   associated passive atelectasis noted on the CT scan.    Left midlung masslike opacity, of uncertain etiology, again seen.    < end of copied text >  < from: CT Angio Chest PE Protocol w/ IV Cont (10.30.24 @ 23:13) >  Exam limited due to combination of respiratory motion artifact,   suboptimal contrast bolus timing and photon starvation. Within this   constraint:    No evidence of main/proximal segmental pulmonary embolus.    Left upper lobe peripheral airspace consolidation and 1.2 cm nodular   airspace consolidation in the left lower lobe. Differential diagnosis   includes pulmonary infarct, infection, inflammation or less likely   masslike consolidation. Follow-up to resolution recommended.    Small right pleural effusion.    < end of copied text >    DIAGNOSTIC TESTING:    [x ] Echocardiogram: < from: TTE Echo Complete w/ Contrast w/ Doppler (11.01.24 @ 10:28) >     1. Left ventricular systolic function is mildly to moderately decreased   with an ejection fraction visually estimated at 45 to 50 %.   2. There is severe (grade 3) left ventricular diastolic dysfunction.   3. Enlarged right ventricular cavity size and reduced right ventricular   systolic function.   4. Left atrium is mildly dilated.   5. Thickened mitral valve leaflets.   6. Mild mitral valve leaflet calcification.   7. There is mild calcification of the mitral valve annulus.   8. Trace mitral regurgitation.   9. Moderate tricuspid regurgitation.  10. Fibrocalcific aortic valve sclerosis without stenosis.  11. No pericardial effusion seen.    < end of copied text >    LABS:	 	    04 Nov 2024 05:50    139    |  96     |  25     ----------------------------<  108    4.1     |  36     |  0.79   03 Nov 2024 22:10    139    |  97     |  26     ----------------------------<  123    4.1     |  38     |  0.83   03 Nov 2024 15:20    138    |  95     |  27     ----------------------------<  130    4.0     |  38     |  0.93     Ca    8.8        04 Nov 2024 05:50  Phos  3.6       04 Nov 2024 05:50  Mg     1.9       04 Nov 2024 05:50    TPro  6.4    /  Alb  2.9    /  TBili  0.9    /  DBili  x      /  AST  28     /  ALT  25     /  AlkPhos  55     04 Nov 2024 05:50                        13.1   11.70 )-----------( 185      ( 04 Nov 2024 05:50 )             43.1 ,                       12.7   11.86 )-----------( 187      ( 03 Nov 2024 04:10 )             41.8 ,                       12.2   8.63  )-----------( 186      ( 02 Nov 2024 04:15 )             40.6   pro-BNP: Pro-Brain Natriuretic Peptide: 5436 pg/mL (11.03.24 @ 04:10)

## 2024-11-04 NOTE — PHYSICAL THERAPY INITIAL EVALUATION ADULT - PRECAUTIONS/LIMITATIONS, REHAB EVAL
"/70 (BP Location: Right arm)   Pulse 82   Temp 97.9  F (36.6  C) (Oral)   Resp 16   Ht 1.803 m (5' 11\")   Wt 136.7 kg (301 lb 7 oz)   SpO2 98%   BMI 42.04 kg/m      Problem: Pain Chronic (Persistent)  Goal: Acceptable Pain Control and Functional Ability  Outcome: Improving  Patient's pain well controlled with PO Norco.    Problem: Adult Inpatient Plan of Care  Goal: Optimal Comfort and Wellbeing  Outcome: Improving  Patient reports being up to toilet with medium, soft and formed BM.  IV went bad prior to antibiotic start. PICC team needed for new peripheral start.   "
cardiac precautions/fall precautions
fall precautions

## 2024-11-05 LAB
ALBUMIN SERPL ELPH-MCNC: 2.8 G/DL — LOW (ref 3.3–5)
ALP SERPL-CCNC: 57 U/L — SIGNIFICANT CHANGE UP (ref 40–120)
ALT FLD-CCNC: 24 U/L — SIGNIFICANT CHANGE UP (ref 12–78)
ANION GAP SERPL CALC-SCNC: 7 MMOL/L — SIGNIFICANT CHANGE UP (ref 5–17)
AST SERPL-CCNC: 29 U/L — SIGNIFICANT CHANGE UP (ref 15–37)
BILIRUB SERPL-MCNC: 1.1 MG/DL — SIGNIFICANT CHANGE UP (ref 0.2–1.2)
BUN SERPL-MCNC: 24 MG/DL — HIGH (ref 7–23)
CALCIUM SERPL-MCNC: 9.2 MG/DL — SIGNIFICANT CHANGE UP (ref 8.5–10.1)
CHLORIDE SERPL-SCNC: 93 MMOL/L — LOW (ref 96–108)
CO2 SERPL-SCNC: 39 MMOL/L — HIGH (ref 22–31)
CREAT SERPL-MCNC: 0.78 MG/DL — SIGNIFICANT CHANGE UP (ref 0.5–1.3)
EGFR: 97 ML/MIN/1.73M2 — SIGNIFICANT CHANGE UP
GLUCOSE SERPL-MCNC: 109 MG/DL — HIGH (ref 70–99)
HCT VFR BLD CALC: 44.5 % — SIGNIFICANT CHANGE UP (ref 39–50)
HGB BLD-MCNC: 13.5 G/DL — SIGNIFICANT CHANGE UP (ref 13–17)
MAGNESIUM SERPL-MCNC: 1.6 MG/DL — SIGNIFICANT CHANGE UP (ref 1.6–2.6)
MCHC RBC-ENTMCNC: 19.4 PG — LOW (ref 27–34)
MCHC RBC-ENTMCNC: 30.3 G/DL — LOW (ref 32–36)
MCV RBC AUTO: 64 FL — LOW (ref 80–100)
NRBC # BLD: 0 /100 WBCS — SIGNIFICANT CHANGE UP (ref 0–0)
NT-PROBNP SERPL-SCNC: 3247 PG/ML — HIGH (ref 0–125)
PHOSPHATE SERPL-MCNC: 3.3 MG/DL — SIGNIFICANT CHANGE UP (ref 2.5–4.5)
PLATELET # BLD AUTO: 187 K/UL — SIGNIFICANT CHANGE UP (ref 150–400)
POTASSIUM SERPL-MCNC: 3.8 MMOL/L — SIGNIFICANT CHANGE UP (ref 3.5–5.3)
POTASSIUM SERPL-SCNC: 3.8 MMOL/L — SIGNIFICANT CHANGE UP (ref 3.5–5.3)
PROT SERPL-MCNC: 6.5 GM/DL — SIGNIFICANT CHANGE UP (ref 6–8.3)
RBC # BLD: 6.95 M/UL — HIGH (ref 4.2–5.8)
RBC # FLD: 17.4 % — HIGH (ref 10.3–14.5)
SODIUM SERPL-SCNC: 139 MMOL/L — SIGNIFICANT CHANGE UP (ref 135–145)
WBC # BLD: 10.8 K/UL — HIGH (ref 3.8–10.5)
WBC # FLD AUTO: 10.8 K/UL — HIGH (ref 3.8–10.5)

## 2024-11-05 PROCEDURE — 99291 CRITICAL CARE FIRST HOUR: CPT

## 2024-11-05 PROCEDURE — 99233 SBSQ HOSP IP/OBS HIGH 50: CPT

## 2024-11-05 PROCEDURE — 71045 X-RAY EXAM CHEST 1 VIEW: CPT | Mod: 26

## 2024-11-05 RX ORDER — MAGNESIUM SULFATE IN 0.9% NACL 2 G/50 ML
2 INTRAVENOUS SOLUTION, PIGGYBACK (ML) INTRAVENOUS ONCE
Refills: 0 | Status: COMPLETED | OUTPATIENT
Start: 2024-11-05 | End: 2024-11-05

## 2024-11-05 RX ORDER — POTASSIUM CHLORIDE 10 MEQ
40 TABLET, EXTENDED RELEASE ORAL ONCE
Refills: 0 | Status: COMPLETED | OUTPATIENT
Start: 2024-11-05 | End: 2024-11-05

## 2024-11-05 RX ORDER — LOSARTAN POTASSIUM 25 MG/1
25 TABLET ORAL EVERY 24 HOURS
Refills: 0 | Status: DISCONTINUED | OUTPATIENT
Start: 2024-11-05 | End: 2024-11-14

## 2024-11-05 RX ORDER — SPIRONOLACTONE 100 MG
25 TABLET ORAL ONCE
Refills: 0 | Status: COMPLETED | OUTPATIENT
Start: 2024-11-05 | End: 2024-11-05

## 2024-11-05 RX ORDER — SPIRONOLACTONE 100 MG
50 TABLET ORAL DAILY
Refills: 0 | Status: DISCONTINUED | OUTPATIENT
Start: 2024-11-06 | End: 2024-11-14

## 2024-11-05 RX ADMIN — Medication 25 MILLIGRAM(S): at 05:17

## 2024-11-05 RX ADMIN — Medication 125 MICROGRAM(S): at 05:17

## 2024-11-05 RX ADMIN — Medication 650 MILLIGRAM(S): at 04:24

## 2024-11-05 RX ADMIN — ERYTHROMYCIN 1 APPLICATION(S): 5 OINTMENT OPHTHALMIC at 05:18

## 2024-11-05 RX ADMIN — LOSARTAN POTASSIUM 25 MILLIGRAM(S): 25 TABLET ORAL at 13:16

## 2024-11-05 RX ADMIN — Medication 1 DROP(S): at 17:18

## 2024-11-05 RX ADMIN — Medication 1 APPLICATION(S): at 11:08

## 2024-11-05 RX ADMIN — ERYTHROMYCIN 1 APPLICATION(S): 5 OINTMENT OPHTHALMIC at 21:56

## 2024-11-05 RX ADMIN — CHLORHEXIDINE GLUCONATE 1 APPLICATION(S): 40 SOLUTION TOPICAL at 05:29

## 2024-11-05 RX ADMIN — Medication 81 MILLIGRAM(S): at 11:08

## 2024-11-05 RX ADMIN — Medication 300 MILLIGRAM(S): at 11:17

## 2024-11-05 RX ADMIN — Medication 1 DROP(S): at 05:18

## 2024-11-05 RX ADMIN — Medication 5 MILLIGRAM(S): at 21:56

## 2024-11-05 RX ADMIN — Medication 132 MILLIGRAM(S): at 22:43

## 2024-11-05 RX ADMIN — Medication 650 MILLIGRAM(S): at 03:19

## 2024-11-05 RX ADMIN — Medication 25 GRAM(S): at 05:16

## 2024-11-05 RX ADMIN — Medication 150 MILLIGRAM(S): at 06:07

## 2024-11-05 RX ADMIN — Medication 25 MILLIGRAM(S): at 13:24

## 2024-11-05 RX ADMIN — APIXABAN 5 MILLIGRAM(S): 5 TABLET, FILM COATED ORAL at 05:17

## 2024-11-05 RX ADMIN — Medication 132 MILLIGRAM(S): at 11:18

## 2024-11-05 RX ADMIN — APIXABAN 5 MILLIGRAM(S): 5 TABLET, FILM COATED ORAL at 17:16

## 2024-11-05 RX ADMIN — Medication 40 MILLIEQUIVALENT(S): at 05:17

## 2024-11-05 RX ADMIN — Medication 6 MILLIGRAM(S): at 21:56

## 2024-11-05 RX ADMIN — ERYTHROMYCIN 1 APPLICATION(S): 5 OINTMENT OPHTHALMIC at 13:17

## 2024-11-05 NOTE — PROGRESS NOTE ADULT - ASSESSMENT
67 yo m pmhx obesity, HTN on metoprolol XL and amlodipine, HLD, PVD and gout admitted with new onset JOVAN, acute congestive heart failure, acute hypoxic respiratory failure, component of pulmonary edema, b/l LE edema with ulcerations of right leg.     NEURO: No active issues  CV: Atrial fibrillation, now rate controlled with digoxin.    - Continue with Lopressor 50mg tid and digoxin daily.  RESP: Acute hypoxic respiratory failure now on HFNC. Titrate FiO2 as tolerated, remains on 50L 60%.   - continues to diurese well on diamox, increased bumex 4mg BID  - Will continue with Diamox and bumex BID.   - Still overloaded on bedside POCUS IVC/Lung exam.  RENAL: Continues to diurese well with net neg 6900; strict I&Os, monitor electrolytes. replace lytes as needed. Creatinine normal/stable. trend BMP.  - Noted daily weight trend and net neg I/Os.  GI: Dash/TLC Diet  ENDO: No active issues  ID: observing off abx.  HEME: C/w eliquis fo afib  DISPO: Full code.

## 2024-11-05 NOTE — PROGRESS NOTE ADULT - ASSESSMENT
69 y/o male with a pmhx of Hypertension, Gout, Hypercholesteremia, and Morbid obesity admitted with Decompensated HF with fluid overload, and AF with RVR.    TTE with EF 45-50%,  grade III DD,  Enlarged right ventricular cavity size and reduced right ventricular systolic function. mod TR  off lasix drip and cardizem drip, HR 80-100s  BNP 5400 ->3200    -currently on Bumex 4 mg ivpb bid, aldactone 25 mg daily-> will increase to 50/d and losartan 25/d   -Strict I&O, daily weights, diuresing well    -s/p Diamox dosing 11/3 for elevated bicarb   -Replete Mg to 2, and K+ to 4, monitor renal function closely  -cont on Toprol 150/d dig .125mg po daily, Dig level 1.12 (11/4)  -Currently on HiFlo 60% FIO2, 50 L per/min   -increase activity as tolerated, PT  -outpatient follow up with EP cardiology Dr. Leonie Quinn, outpatient sleep study       69 y/o male with a pmhx of Hypertension, Gout, Hypercholesteremia, and Morbid obesity admitted with Decompensated HF with fluid overload, and AF with RVR.    TTE with EF 45-50%,  grade III DD,  Enlarged right ventricular cavity size and reduced right ventricular systolic function. mod TR  off lasix drip and cardizem drip, HR 80-100s  BNP 5400 ->3200    -currently on Bumex 4 mg ivpb bid, aldactone 25 mg daily-> will increase to 50/d and add losartan 25/d   -Strict I&O, daily weights, diuresing well    -s/p Diamox dosing 11/3 for elevated bicarb   -Replete Mg to 2, and K+ to 4, monitor renal function closely  -cont on Toprol 150/d dig .125mg po daily, Dig level 1.12 (11/4)  -cont on Eliquis 5mg bid for AC  -Currently on HiFlo 60% FIO2, 50 L per/min   -increase activity as tolerated, PT  -outpatient follow up with EP cardiology Dr. Leonie Quinn, outpatient sleep study

## 2024-11-05 NOTE — PROGRESS NOTE ADULT - SUBJECTIVE AND OBJECTIVE BOX
INTERVAL HPI/OVERNIGHT EVENTS:      Patient seen and examined this AM.  Pt noted to have -3350 overnight and -6900 in 24 hours.    Daily Weight in k (2024 10:00)    LUNG ULTRASOUND with compressive atelectasis of R base with bilateral b-lines at bases; anteriorly B-lines more on L compared to R side.    with a-line predominant at R anterior chest.     IVC remains plethoric.    CENTRAL LINE: [ ] YES [x ] NO  LOCATION:       BURCH: [x ] YES [ ] NO        A-LINE:  [ ] YES [ x] NO  LOCATION:       GLOBAL ISSUE/BEST PRACTICE:  Analgesia:   Sedation:  HOB elevation: yes  Stress ulcer prophylaxis:   VTE prophylaxis: apixaban 5 milliGRAM(s) Oral every 12 hours  aspirin  chewable 81 milliGRAM(s) Oral daily  Oral Care: Chlorhexidine  Glycemic control:   Nutrition:Diet, DASH/TLC:   Sodium & Cholesterol Restricted (10-31-24 @ 01:39) [Active]    REVIEW OF SYSTEMS:  [x] All other systems negative    CONSTITUTIONAL: No fever, weight loss, or fatigue  EYES: No eye pain, visual disturbances, or discharge  ENMT:  No difficulty hearing, tinnitus, vertigo; No sinus or throat pain  NECK: No pain or stiffness  RESPIRATORY: No cough, wheezing, chills or hemoptysis; No shortness of breath  CARDIOVASCULAR: No chest pain, palpitations, dizziness, or leg swelling  GASTROINTESTINAL: No abdominal or epigastric pain. No nausea, vomiting, or hematemesis; No diarrhea or constipation. No melena or hematochezia.  GENITOURINARY: No dysuria, frequency, hematuria, or incontinence  NEUROLOGICAL: No headaches, memory loss, loss of strength, numbness, or tremors  SKIN: No itching, burning, rashes, or lesions     PHYSICAL EXAM:    GENERAL: Obese, on high flow nasal cannula,   HEENT: NCAT, EOMI, PERRLA, conjunctiva and sclera clear; Moist mucous membranes  NECK: Supple  CHEST/LUNG: CTABL; No rales, rhonchi, wheezing, or rubs  HEART: S1S2, RRR, No murmurs, rubs, or gallops  ABDOMEN: Soft, obese, Nontender, Nondistended; Bowel sounds present  EXTREMITIES:  2+ Peripheral Pulses, 3+edema  NERVOUS SYSTEM:  Alert & Oriented X3, Good concentration; Motor Strength 5/5 B/L upper and lower extremities;      ICU Vital Signs Last 24 Hrs  T(C): 37.1 (2024 07:34), Max: 37.5 (2024 17:00)  T(F): 98.7 (2024 07:34), Max: 99.5 (2024 17:00)  HR: 85 (2024 09:40) (83 - 113)  BP: 110/70 (2024 09:00) (98/68 - 134/83)  BP(mean): 80 (2024 09:00) (74 - 110)  ABP: --  ABP(mean): --  RR: 20 (2024 11:) (14 - 28)  SpO2: 95% (:) (75% - 96%)    O2 Parameters below as of 2024 11:01  Patient On (Oxygen Delivery Method): nasal cannula, high flow  O2 Flow (L/min): 50  O2 Concentration (%): 60      I&O's Detail    2024 07:01  -  2024 07:00  --------------------------------------------------------  IN:    IV PiggyBack: 200 mL  Total IN: 200 mL    OUT:    Indwelling Catheter - Urethral (mL): 7100 mL  Total OUT: 7100 mL    Total NET: -6900 mL      2024 07:01  -  2024 12:23  --------------------------------------------------------  IN:    Oral Fluid: 190 mL  Total IN: 190 mL    OUT:    Indwelling Catheter - Urethral (mL): 700 mL  Total OUT: 700 mL    Total NET: -510 mL        MEDICATIONS  NEURO  Meds: acetaminophen     Tablet .. 650 milliGRAM(s) Oral every 6 hours PRN Temp greater or equal to 38C (100.4F), Mild Pain (1 - 3)  melatonin 6 milliGRAM(s) Oral at bedtime PRN Insomnia  ondansetron Injectable 4 milliGRAM(s) IV Push every 8 hours PRN Nausea and/or Vomiting    RESPIRATORY    Meds:   CARDIOVASCULAR  Meds: buMETAnide IVPB 4 milliGRAM(s) IV Intermittent every 12 hours  digoxin     Tablet 125 MICROGram(s) Oral daily  metoprolol succinate  milliGRAM(s) Oral daily  spironolactone 25 milliGRAM(s) Oral daily    GI/NUTRITION  Meds: polyethylene glycol 3350 17 Gram(s) Oral daily  senna 2 Tablet(s) Oral at bedtime    GENITOURINARY  Meds:   HEMATOLOGIC  Meds: apixaban 5 milliGRAM(s) Oral every 12 hours  aspirin  chewable 81 milliGRAM(s) Oral daily    [x] VTE Prophylaxis  INFECTIOUS DISEASES  Meds: influenza  Vaccine (HIGH DOSE) 0.5 milliLiter(s) IntraMuscular once    ENDOCRINE  CAPILLARY BLOOD GLUCOSE        Meds:   OTHER MEDICATIONS:  artificial  tears Solution 1 Drop(s) Left EYE every 12 hours  benzocaine/menthol Lozenge 1 Lozenge Oral every 6 hours PRN  chlorhexidine 2% Cloths 1 Application(s) Topical <User Schedule>  collagenase Ointment 1 Application(s) Topical daily  erythromycin   Ointment 1 Application(s) Left EYE every 8 hours  :    LABS:                        13.5   10.80 )-----------( 187      ( 2024 03:15 )             44.5      11-05    139  |  93[L]  |  24[H]  ----------------------------<  109[H]  3.8   |  39[H]  |  0.78    Ca    9.2      2024 03:15  Phos  3.3     11-05  Mg     1.6     11-05    TPro  6.5  /  Alb  2.8[L]  /  TBili  1.1  /  DBili  x   /  AST  29  /  ALT  24  /  AlkPhos  57  11-05      Urinalysis Basic - ( 2024 03:15 )    Color: x / Appearance: x / SG: x / pH: x  Gluc: 109 mg/dL / Ketone: x  / Bili: x / Urobili: x   Blood: x / Protein: x / Nitrite: x   Leuk Esterase: x / RBC: x / WBC x   Sq Epi: x / Non Sq Epi: x / Bacteria: x                RADIOLOGY & ADDITIONAL STUDIES:

## 2024-11-05 NOTE — PROGRESS NOTE ADULT - NS ATTEND AMEND GEN_ALL_CORE FT
Acutely decompensated HFpEF with fluid overload.  AF with RVR. Now better controlled.  Loaded with Dig. Level 1.1. On TID metoprolol.    Excellent diuresis. Still has edema.    -Wean off HiFlo to nasal cannula when able, will need SHILOH evaluation.  -Should be seen by an EP dr after d/c for consideration of JOSUÉ/DCCV. Cont apixaban.  -Start losartan 25 mg qd.  -Increase malcom to 50 mg qd.

## 2024-11-05 NOTE — PROGRESS NOTE ADULT - SUBJECTIVE AND OBJECTIVE BOX
Patient is a 68y old  Male who presents with a chief complaint of New onset Afib & CHF (04 Nov 2024 13:25)      PAST MEDICAL & SURGICAL HISTORY:  Hypertension      Gout      Hypercholesteremia      Morbid obesity      H/O colonoscopy        INTERVAL HISTORY:  	  MEDICATIONS:  MEDICATIONS  (STANDING):  allopurinol 300 milliGRAM(s) Oral daily  apixaban 5 milliGRAM(s) Oral every 12 hours  artificial  tears Solution 1 Drop(s) Left EYE every 12 hours  aspirin  chewable 81 milliGRAM(s) Oral daily  buMETAnide IVPB 4 milliGRAM(s) IV Intermittent every 12 hours  chlorhexidine 2% Cloths 1 Application(s) Topical <User Schedule>  collagenase Ointment 1 Application(s) Topical daily  digoxin     Tablet 125 MICROGram(s) Oral daily  erythromycin   Ointment 1 Application(s) Left EYE every 8 hours  influenza  Vaccine (HIGH DOSE) 0.5 milliLiter(s) IntraMuscular once  metoprolol succinate  milliGRAM(s) Oral daily  polyethylene glycol 3350 17 Gram(s) Oral daily  rosuvastatin 5 milliGRAM(s) Oral at bedtime  senna 2 Tablet(s) Oral at bedtime  spironolactone 25 milliGRAM(s) Oral daily    MEDICATIONS  (PRN):  acetaminophen     Tablet .. 650 milliGRAM(s) Oral every 6 hours PRN Temp greater or equal to 38C (100.4F), Mild Pain (1 - 3)  benzocaine/menthol Lozenge 1 Lozenge Oral every 6 hours PRN Sore Throat  melatonin 6 milliGRAM(s) Oral at bedtime PRN Insomnia  ondansetron Injectable 4 milliGRAM(s) IV Push every 8 hours PRN Nausea and/or Vomiting    Vitals:  T(F): 98.7 (11-05-24 @ 07:34), Max: 99.5 (11-04-24 @ 17:00)  HR: 85 (11-05-24 @ 09:40) (79 - 113)  BP: 110/70 (11-05-24 @ 09:00) (98/68 - 134/83)  RR: 28 (11-05-24 @ 09:40) (12 - 28)  SpO2: 90% (11-05-24 @ 09:40) (75% - 96%)    11-04 @ 07:01  -  11-05 @ 07:00  --------------------------------------------------------  IN:    IV PiggyBack: 200 mL  Total IN: 200 mL    OUT:    Indwelling Catheter - Urethral (mL): 7100 mL  Total OUT: 7100 mL    Total NET: -6900 mL    PHYSICAL EXAM:  Neuro: Awake, responsive  CV: S1 S2 irreg irregular   Lungs:   GI: Soft, BS +, ND, NT  Extremities: +LE edema    TELEMETRY: afib    RADIOLOGY: < from: Xray Chest 1 View- PORTABLE-Routine (Xray Chest 1 View- PORTABLE-Routine in AM.) (11.05.24 @ 07:01) >  IMPRESSION:  Continued elevation of right hemidiaphragm with right   basilar opacity, likely a right pleural effusion with associated passive   atelectasis.    Resolution of left midlung masslike opacity.    Patchy left upper, mid, and lower lung opacities.    < end of copied text >  < from: CT Angio Chest PE Protocol w/ IV Cont (10.30.24 @ 23:13) >  Exam limited due to combination of respiratory motion artifact,   suboptimal contrast bolus timing and photon starvation. Within this   constraint:    No evidence of main/proximal segmental pulmonary embolus.    Left upper lobe peripheral airspace consolidation and 1.2 cm nodular   airspace consolidation in the left lower lobe. Differential diagnosis   includes pulmonary infarct, infection, inflammation or less likely   masslike consolidation. Follow-up to resolution recommended.    Small right pleural effusion.    < end of copied text >    DIAGNOSTIC TESTING:    [x ] Echocardiogram: < from: TTE Echo Complete w/ Contrast w/ Doppler (11.01.24 @ 10:28) >   1. Left ventricular systolic function is mildly to moderately decreased   with an ejection fraction visually estimated at 45 to 50 %.   2. There is severe (grade 3) left ventricular diastolic dysfunction.   3. Enlarged right ventricular cavity size and reduced right ventricular   systolic function.   4. Left atrium is mildly dilated.   5. Thickened mitral valve leaflets.   6. Mild mitral valve leaflet calcification.   7. There is mild calcification of the mitral valve annulus.   8. Trace mitral regurgitation.   9. Moderate tricuspid regurgitation.  10. Fibrocalcific aortic valve sclerosis without stenosis.  11. No pericardial effusion seen.    < end of copied text >    LABS:	 	    05 Nov 2024 03:15    139    |  93     |  24     ----------------------------<  109    3.8     |  39     |  0.78   04 Nov 2024 20:10    139    |  95     |  24     ----------------------------<  111    4.0     |  38     |  0.78   04 Nov 2024 05:50    139    |  96     |  25     ----------------------------<  108    4.1     |  36     |  0.79     Ca    9.2        05 Nov 2024 03:15  Phos  3.3       05 Nov 2024 03:15  Mg     1.6       05 Nov 2024 03:15    TPro  6.5    /  Alb  2.8    /  TBili  1.1    /  DBili  x      /  AST  29     /  ALT  24     /  AlkPhos  57     05 Nov 2024 03:15                        13.5   10.80 )-----------( 187      ( 05 Nov 2024 03:15 )             44.5 ,                       13.1   11.70 )-----------( 185      ( 04 Nov 2024 05:50 )             43.1 ,                       12.7   11.86 )-----------( 187      ( 03 Nov 2024 04:10 )             41.8   pro-BNP: Pro-Brain Natriuretic Peptide: 5436 pg/mL (11.03.24 @ 04:10)                     Patient is a 68y old  Male who presents with New onset Afib     PAST MEDICAL & SURGICAL HISTORY:  Hypertension    Gout    Hypercholesteremia    Morbid obesity    INTERVAL HISTORY: in no acute distress, dyspnea improving, denies any chest pain or palpitation   	  MEDICATIONS:  MEDICATIONS  (STANDING):  allopurinol 300 milliGRAM(s) Oral daily  apixaban 5 milliGRAM(s) Oral every 12 hours  artificial  tears Solution 1 Drop(s) Left EYE every 12 hours  aspirin  chewable 81 milliGRAM(s) Oral daily  buMETAnide IVPB 4 milliGRAM(s) IV Intermittent every 12 hours  chlorhexidine 2% Cloths 1 Application(s) Topical <User Schedule>  collagenase Ointment 1 Application(s) Topical daily  digoxin     Tablet 125 MICROGram(s) Oral daily  erythromycin   Ointment 1 Application(s) Left EYE every 8 hours  influenza  Vaccine (HIGH DOSE) 0.5 milliLiter(s) IntraMuscular once  metoprolol succinate  milliGRAM(s) Oral daily  polyethylene glycol 3350 17 Gram(s) Oral daily  rosuvastatin 5 milliGRAM(s) Oral at bedtime  senna 2 Tablet(s) Oral at bedtime  spironolactone 25 milliGRAM(s) Oral daily    MEDICATIONS  (PRN):  acetaminophen     Tablet .. 650 milliGRAM(s) Oral every 6 hours PRN Temp greater or equal to 38C (100.4F), Mild Pain (1 - 3)  benzocaine/menthol Lozenge 1 Lozenge Oral every 6 hours PRN Sore Throat  melatonin 6 milliGRAM(s) Oral at bedtime PRN Insomnia  ondansetron Injectable 4 milliGRAM(s) IV Push every 8 hours PRN Nausea and/or Vomiting    Vitals:  T(F): 98.7 (11-05-24 @ 07:34), Max: 99.5 (11-04-24 @ 17:00)  HR: 85 (11-05-24 @ 09:40) (79 - 113)  BP: 110/70 (11-05-24 @ 09:00) (98/68 - 134/83)  RR: 28 (11-05-24 @ 09:40) (12 - 28)  SpO2: 90% (11-05-24 @ 09:40) (75% - 96%)    11-04 @ 07:01  -  11-05 @ 07:00  --------------------------------------------------------  IN:    IV PiggyBack: 200 mL  Total IN: 200 mL    OUT:    Indwelling Catheter - Urethral (mL): 7100 mL  Total OUT: 7100 mL    Total NET: -6900 mL    PHYSICAL EXAM:  Neuro: Awake, responsive  CV: S1 S2 irreg irregular   Lungs: diminished to bases Rt > Lt   GI: Soft, BS +, ND, NT  Extremities: +LE edema    TELEMETRY: afib    RADIOLOGY: < from: Xray Chest 1 View- PORTABLE-Routine (Xray Chest 1 View- PORTABLE-Routine in AM.) (11.05.24 @ 07:01) >  IMPRESSION:  Continued elevation of right hemidiaphragm with right   basilar opacity, likely a right pleural effusion with associated passive   atelectasis.    Resolution of left midlung masslike opacity.    Patchy left upper, mid, and lower lung opacities.    < end of copied text >  < from: CT Angio Chest PE Protocol w/ IV Cont (10.30.24 @ 23:13) >  Exam limited due to combination of respiratory motion artifact,   suboptimal contrast bolus timing and photon starvation. Within this   constraint:    No evidence of main/proximal segmental pulmonary embolus.    Left upper lobe peripheral airspace consolidation and 1.2 cm nodular   airspace consolidation in the left lower lobe. Differential diagnosis   includes pulmonary infarct, infection, inflammation or less likely   masslike consolidation. Follow-up to resolution recommended.    Small right pleural effusion.    < end of copied text >    DIAGNOSTIC TESTING:    [x ] Echocardiogram: < from: TTE Echo Complete w/ Contrast w/ Doppler (11.01.24 @ 10:28) >   1. Left ventricular systolic function is mildly to moderately decreased   with an ejection fraction visually estimated at 45 to 50 %.   2. There is severe (grade 3) left ventricular diastolic dysfunction.   3. Enlarged right ventricular cavity size and reduced right ventricular   systolic function.   4. Left atrium is mildly dilated.   5. Thickened mitral valve leaflets.   6. Mild mitral valve leaflet calcification.   7. There is mild calcification of the mitral valve annulus.   8. Trace mitral regurgitation.   9. Moderate tricuspid regurgitation.  10. Fibrocalcific aortic valve sclerosis without stenosis.  11. No pericardial effusion seen.    < end of copied text >    LABS:	 	    05 Nov 2024 03:15    139    |  93     |  24     ----------------------------<  109    3.8     |  39     |  0.78   04 Nov 2024 20:10    139    |  95     |  24     ----------------------------<  111    4.0     |  38     |  0.78   04 Nov 2024 05:50    139    |  96     |  25     ----------------------------<  108    4.1     |  36     |  0.79     Ca    9.2        05 Nov 2024 03:15  Phos  3.3       05 Nov 2024 03:15  Mg     1.6       05 Nov 2024 03:15    TPro  6.5    /  Alb  2.8    /  TBili  1.1    /  DBili  x      /  AST  29     /  ALT  24     /  AlkPhos  57     05 Nov 2024 03:15                        13.5   10.80 )-----------( 187      ( 05 Nov 2024 03:15 )             44.5 ,                       13.1   11.70 )-----------( 185      ( 04 Nov 2024 05:50 )             43.1 ,                       12.7   11.86 )-----------( 187      ( 03 Nov 2024 04:10 )             41.8   pro-BNP: Pro-Brain Natriuretic Peptide: 5436 pg/mL (11.03.24 @ 04:10)  Pro-Brain Natriuretic Peptide: 3247 pg/mL (11.05.24 @ 03:15)                         Patient is a 68y old  Male who presents with New onset Afib     PAST MEDICAL & SURGICAL HISTORY:  Hypertension    Gout    Hypercholesteremia    PVD    Morbid obesity    INTERVAL HISTORY: in no acute distress, dyspnea improving, denies any chest pain or palpitation   	  MEDICATIONS:  MEDICATIONS  (STANDING):  allopurinol 300 milliGRAM(s) Oral daily  apixaban 5 milliGRAM(s) Oral every 12 hours  artificial  tears Solution 1 Drop(s) Left EYE every 12 hours  aspirin  chewable 81 milliGRAM(s) Oral daily  buMETAnide IVPB 4 milliGRAM(s) IV Intermittent every 12 hours  chlorhexidine 2% Cloths 1 Application(s) Topical <User Schedule>  collagenase Ointment 1 Application(s) Topical daily  digoxin     Tablet 125 MICROGram(s) Oral daily  erythromycin   Ointment 1 Application(s) Left EYE every 8 hours  influenza  Vaccine (HIGH DOSE) 0.5 milliLiter(s) IntraMuscular once  metoprolol succinate  milliGRAM(s) Oral daily  polyethylene glycol 3350 17 Gram(s) Oral daily  rosuvastatin 5 milliGRAM(s) Oral at bedtime  senna 2 Tablet(s) Oral at bedtime  spironolactone 25 milliGRAM(s) Oral daily    MEDICATIONS  (PRN):  acetaminophen     Tablet .. 650 milliGRAM(s) Oral every 6 hours PRN Temp greater or equal to 38C (100.4F), Mild Pain (1 - 3)  benzocaine/menthol Lozenge 1 Lozenge Oral every 6 hours PRN Sore Throat  melatonin 6 milliGRAM(s) Oral at bedtime PRN Insomnia  ondansetron Injectable 4 milliGRAM(s) IV Push every 8 hours PRN Nausea and/or Vomiting    Vitals:  T(F): 98.7 (11-05-24 @ 07:34), Max: 99.5 (11-04-24 @ 17:00)  HR: 85 (11-05-24 @ 09:40) (79 - 113)  BP: 110/70 (11-05-24 @ 09:00) (98/68 - 134/83)  RR: 28 (11-05-24 @ 09:40) (12 - 28)  SpO2: 90% (11-05-24 @ 09:40) (75% - 96%)    11-04 @ 07:01  -  11-05 @ 07:00  --------------------------------------------------------  IN:    IV PiggyBack: 200 mL  Total IN: 200 mL    OUT:    Indwelling Catheter - Urethral (mL): 7100 mL  Total OUT: 7100 mL    Total NET: -6900 mL    PHYSICAL EXAM:  Neuro: Awake, responsive  CV: S1 S2 irreg irregular   Lungs: diminished to bases Rt > Lt   GI: Soft, BS +, ND, NT  Extremities: +LE edema    TELEMETRY: afib    RADIOLOGY: < from: Xray Chest 1 View- PORTABLE-Routine (Xray Chest 1 View- PORTABLE-Routine in AM.) (11.05.24 @ 07:01) >  IMPRESSION:  Continued elevation of right hemidiaphragm with right   basilar opacity, likely a right pleural effusion with associated passive   atelectasis.    Resolution of left midlung masslike opacity.    Patchy left upper, mid, and lower lung opacities.    < end of copied text >  < from: CT Angio Chest PE Protocol w/ IV Cont (10.30.24 @ 23:13) >  Exam limited due to combination of respiratory motion artifact,   suboptimal contrast bolus timing and photon starvation. Within this   constraint:    No evidence of main/proximal segmental pulmonary embolus.    Left upper lobe peripheral airspace consolidation and 1.2 cm nodular   airspace consolidation in the left lower lobe. Differential diagnosis   includes pulmonary infarct, infection, inflammation or less likely   masslike consolidation. Follow-up to resolution recommended.    Small right pleural effusion.    < end of copied text >    DIAGNOSTIC TESTING:    [x ] Echocardiogram: < from: TTE Echo Complete w/ Contrast w/ Doppler (11.01.24 @ 10:28) >   1. Left ventricular systolic function is mildly to moderately decreased   with an ejection fraction visually estimated at 45 to 50 %.   2. There is severe (grade 3) left ventricular diastolic dysfunction.   3. Enlarged right ventricular cavity size and reduced right ventricular   systolic function.   4. Left atrium is mildly dilated.   5. Thickened mitral valve leaflets.   6. Mild mitral valve leaflet calcification.   7. There is mild calcification of the mitral valve annulus.   8. Trace mitral regurgitation.   9. Moderate tricuspid regurgitation.  10. Fibrocalcific aortic valve sclerosis without stenosis.  11. No pericardial effusion seen.    < end of copied text >    LABS:	 	    05 Nov 2024 03:15    139    |  93     |  24     ----------------------------<  109    3.8     |  39     |  0.78   04 Nov 2024 20:10    139    |  95     |  24     ----------------------------<  111    4.0     |  38     |  0.78   04 Nov 2024 05:50    139    |  96     |  25     ----------------------------<  108    4.1     |  36     |  0.79     Ca    9.2        05 Nov 2024 03:15  Phos  3.3       05 Nov 2024 03:15  Mg     1.6       05 Nov 2024 03:15    TPro  6.5    /  Alb  2.8    /  TBili  1.1    /  DBili  x      /  AST  29     /  ALT  24     /  AlkPhos  57     05 Nov 2024 03:15                        13.5   10.80 )-----------( 187      ( 05 Nov 2024 03:15 )             44.5 ,                       13.1   11.70 )-----------( 185      ( 04 Nov 2024 05:50 )             43.1 ,                       12.7   11.86 )-----------( 187      ( 03 Nov 2024 04:10 )             41.8   pro-BNP: Pro-Brain Natriuretic Peptide: 5436 pg/mL (11.03.24 @ 04:10)  Pro-Brain Natriuretic Peptide: 3247 pg/mL (11.05.24 @ 03:15)

## 2024-11-06 LAB
ALBUMIN SERPL ELPH-MCNC: 2.8 G/DL — LOW (ref 3.3–5)
ALP SERPL-CCNC: 62 U/L — SIGNIFICANT CHANGE UP (ref 40–120)
ALT FLD-CCNC: 28 U/L — SIGNIFICANT CHANGE UP (ref 12–78)
ANION GAP SERPL CALC-SCNC: 8 MMOL/L — SIGNIFICANT CHANGE UP (ref 5–17)
AST SERPL-CCNC: 28 U/L — SIGNIFICANT CHANGE UP (ref 15–37)
BILIRUB SERPL-MCNC: 1.1 MG/DL — SIGNIFICANT CHANGE UP (ref 0.2–1.2)
BUN SERPL-MCNC: 24 MG/DL — HIGH (ref 7–23)
CALCIUM SERPL-MCNC: 9.4 MG/DL — SIGNIFICANT CHANGE UP (ref 8.5–10.1)
CHLORIDE SERPL-SCNC: 92 MMOL/L — LOW (ref 96–108)
CO2 SERPL-SCNC: 38 MMOL/L — HIGH (ref 22–31)
CREAT SERPL-MCNC: 0.78 MG/DL — SIGNIFICANT CHANGE UP (ref 0.5–1.3)
CULTURE RESULTS: SIGNIFICANT CHANGE UP
CULTURE RESULTS: SIGNIFICANT CHANGE UP
EGFR: 97 ML/MIN/1.73M2 — SIGNIFICANT CHANGE UP
GLUCOSE SERPL-MCNC: 104 MG/DL — HIGH (ref 70–99)
HCT VFR BLD CALC: 45.8 % — SIGNIFICANT CHANGE UP (ref 39–50)
HGB BLD-MCNC: 14 G/DL — SIGNIFICANT CHANGE UP (ref 13–17)
MAGNESIUM SERPL-MCNC: 1.8 MG/DL — SIGNIFICANT CHANGE UP (ref 1.6–2.6)
MCHC RBC-ENTMCNC: 19.5 PG — LOW (ref 27–34)
MCHC RBC-ENTMCNC: 30.6 G/DL — LOW (ref 32–36)
MCV RBC AUTO: 63.9 FL — LOW (ref 80–100)
NRBC # BLD: 0 /100 WBCS — SIGNIFICANT CHANGE UP (ref 0–0)
PHOSPHATE SERPL-MCNC: 3.4 MG/DL — SIGNIFICANT CHANGE UP (ref 2.5–4.5)
PLATELET # BLD AUTO: 205 K/UL — SIGNIFICANT CHANGE UP (ref 150–400)
POTASSIUM SERPL-MCNC: 3.9 MMOL/L — SIGNIFICANT CHANGE UP (ref 3.5–5.3)
POTASSIUM SERPL-SCNC: 3.9 MMOL/L — SIGNIFICANT CHANGE UP (ref 3.5–5.3)
PROT SERPL-MCNC: 6.7 GM/DL — SIGNIFICANT CHANGE UP (ref 6–8.3)
RBC # BLD: 7.17 M/UL — HIGH (ref 4.2–5.8)
RBC # FLD: 17.4 % — HIGH (ref 10.3–14.5)
SODIUM SERPL-SCNC: 138 MMOL/L — SIGNIFICANT CHANGE UP (ref 135–145)
SPECIMEN SOURCE: SIGNIFICANT CHANGE UP
SPECIMEN SOURCE: SIGNIFICANT CHANGE UP
WBC # BLD: 10.31 K/UL — SIGNIFICANT CHANGE UP (ref 3.8–10.5)
WBC # FLD AUTO: 10.31 K/UL — SIGNIFICANT CHANGE UP (ref 3.8–10.5)

## 2024-11-06 PROCEDURE — 99233 SBSQ HOSP IP/OBS HIGH 50: CPT

## 2024-11-06 PROCEDURE — 99291 CRITICAL CARE FIRST HOUR: CPT | Mod: GC

## 2024-11-06 RX ORDER — MAGNESIUM SULFATE IN 0.9% NACL 2 G/50 ML
2 INTRAVENOUS SOLUTION, PIGGYBACK (ML) INTRAVENOUS ONCE
Refills: 0 | Status: COMPLETED | OUTPATIENT
Start: 2024-11-06 | End: 2024-11-06

## 2024-11-06 RX ORDER — ACETAZOLAMIDE EXTENDED-RELEASE 500 MG/1
250 CAPSULE ORAL EVERY 12 HOURS
Refills: 0 | Status: COMPLETED | OUTPATIENT
Start: 2024-11-06 | End: 2024-11-06

## 2024-11-06 RX ADMIN — POLYETHYLENE GLYCOL 3350 17 GRAM(S): 17 POWDER, FOR SOLUTION ORAL at 13:05

## 2024-11-06 RX ADMIN — ACETAZOLAMIDE EXTENDED-RELEASE 250 MILLIGRAM(S): 500 CAPSULE ORAL at 22:10

## 2024-11-06 RX ADMIN — Medication 125 MICROGRAM(S): at 05:13

## 2024-11-06 RX ADMIN — ERYTHROMYCIN 1 APPLICATION(S): 5 OINTMENT OPHTHALMIC at 13:25

## 2024-11-06 RX ADMIN — Medication 6 MILLIGRAM(S): at 21:54

## 2024-11-06 RX ADMIN — CHLORHEXIDINE GLUCONATE 1 APPLICATION(S): 40 SOLUTION TOPICAL at 05:14

## 2024-11-06 RX ADMIN — APIXABAN 5 MILLIGRAM(S): 5 TABLET, FILM COATED ORAL at 05:13

## 2024-11-06 RX ADMIN — ACETAZOLAMIDE EXTENDED-RELEASE 250 MILLIGRAM(S): 500 CAPSULE ORAL at 13:01

## 2024-11-06 RX ADMIN — Medication 1 DROP(S): at 18:07

## 2024-11-06 RX ADMIN — ERYTHROMYCIN 1 APPLICATION(S): 5 OINTMENT OPHTHALMIC at 21:55

## 2024-11-06 RX ADMIN — APIXABAN 5 MILLIGRAM(S): 5 TABLET, FILM COATED ORAL at 18:06

## 2024-11-06 RX ADMIN — Medication 1 DROP(S): at 05:14

## 2024-11-06 RX ADMIN — Medication 25 GRAM(S): at 05:13

## 2024-11-06 RX ADMIN — Medication 132 MILLIGRAM(S): at 13:01

## 2024-11-06 RX ADMIN — Medication 50 MILLIGRAM(S): at 05:13

## 2024-11-06 RX ADMIN — Medication 132 MILLIGRAM(S): at 23:41

## 2024-11-06 RX ADMIN — Medication 150 MILLIGRAM(S): at 05:12

## 2024-11-06 RX ADMIN — Medication 81 MILLIGRAM(S): at 13:05

## 2024-11-06 RX ADMIN — ERYTHROMYCIN 1 APPLICATION(S): 5 OINTMENT OPHTHALMIC at 06:25

## 2024-11-06 RX ADMIN — Medication 1 APPLICATION(S): at 13:15

## 2024-11-06 RX ADMIN — Medication 300 MILLIGRAM(S): at 15:03

## 2024-11-06 RX ADMIN — Medication 5 MILLIGRAM(S): at 21:55

## 2024-11-06 NOTE — PROGRESS NOTE ADULT - SUBJECTIVE AND OBJECTIVE BOX
Patient is a 68y old  Male who presents with New onset Afib     PAST MEDICAL & SURGICAL HISTORY:  Hypertension    Gout    Hypercholesteremia    PVD    Morbid obesity    INTERVAL HISTORY:  	  MEDICATIONS:  MEDICATIONS  (STANDING):  allopurinol 300 milliGRAM(s) Oral daily  apixaban 5 milliGRAM(s) Oral every 12 hours  artificial  tears Solution 1 Drop(s) Left EYE every 12 hours  aspirin  chewable 81 milliGRAM(s) Oral daily  buMETAnide IVPB 4 milliGRAM(s) IV Intermittent every 12 hours  chlorhexidine 2% Cloths 1 Application(s) Topical <User Schedule>  collagenase Ointment 1 Application(s) Topical daily  digoxin     Tablet 125 MICROGram(s) Oral daily  erythromycin   Ointment 1 Application(s) Left EYE every 8 hours  losartan 25 milliGRAM(s) Oral every 24 hours  metoprolol succinate  milliGRAM(s) Oral daily  polyethylene glycol 3350 17 Gram(s) Oral daily  rosuvastatin 5 milliGRAM(s) Oral at bedtime  senna 2 Tablet(s) Oral at bedtime  spironolactone 50 milliGRAM(s) Oral daily    MEDICATIONS  (PRN):  acetaminophen     Tablet .. 650 milliGRAM(s) Oral every 6 hours PRN Temp greater or equal to 38C (100.4F), Mild Pain (1 - 3)  benzocaine/menthol Lozenge 1 Lozenge Oral every 6 hours PRN Sore Throat  melatonin 6 milliGRAM(s) Oral at bedtime PRN Insomnia  ondansetron Injectable 4 milliGRAM(s) IV Push every 8 hours PRN Nausea and/or Vomiting    Vitals:  T(F): 97.7 (11-06-24 @ 07:15), Max: 98.4 (11-05-24 @ 16:00)  HR: 81 (11-06-24 @ 08:36) (76 - 112)  BP: 104/60 (11-06-24 @ 08:00) (96/70 - 134/84)  RR: 20 (11-06-24 @ 08:36) (0 - 28)  SpO2: 98% (11-06-24 @ 08:36) (88% - 98%)  Wt(kg): --153.5    11-05 @ 07:01  -  11-06 @ 07:00  --------------------------------------------------------  IN:    IV PiggyBack: 150 mL    Oral Fluid: 290 mL  Total IN: 440 mL    OUT:    Indwelling Catheter - Urethral (mL): 5565 mL  Total OUT: 5565 mL    Total NET: -5125 mL    PHYSICAL EXAM:  Neuro: Awake, responsive  CV: S1 S2 irreg irregular   Lungs:   GI: Soft, BS +, ND, NT  Extremities: +LE edema    TELEMETRY: afib    RADIOLOGY: < from: Xray Chest 1 View- PORTABLE-Routine (Xray Chest 1 View- PORTABLE-Routine in AM.) (11.05.24 @ 07:01) >  IMPRESSION:  Continued elevation of right hemidiaphragm with right   basilar opacity, likely a right pleural effusion with associated passive   atelectasis.    Resolution of left midlung masslike opacity.    Patchy left upper, mid, and lower lung opacities.    < end of copied text >  < from: CT Angio Chest PE Protocol w/ IV Cont (10.30.24 @ 23:13) >  Exam limited due to combination of respiratory motion artifact,   suboptimal contrast bolus timing and photon starvation. Within this   constraint:    No evidence of main/proximal segmental pulmonary embolus.    Left upper lobe peripheral airspace consolidation and 1.2 cm nodular   airspace consolidation in the left lower lobe. Differential diagnosis   includes pulmonary infarct, infection, inflammation or less likely   masslike consolidation. Follow-up to resolution recommended.    Small right pleural effusion.    < end of copied text >    DIAGNOSTIC TESTING:    [x ] Echocardiogram: < from: TTE Echo Complete w/ Contrast w/ Doppler (11.01.24 @ 10:28) >   1. Left ventricular systolic function is mildly to moderately decreased   with an ejection fraction visually estimated at 45 to 50 %.   2. There is severe (grade 3) left ventricular diastolic dysfunction.   3. Enlarged right ventricular cavity size and reduced right ventricular   systolic function.   4. Left atrium is mildly dilated.   5. Thickened mitral valve leaflets.   6. Mild mitral valve leaflet calcification.   7. There is mild calcification of the mitral valve annulus.   8. Trace mitral regurgitation.   9. Moderate tricuspid regurgitation.  10. Fibrocalcific aortic valve sclerosis without stenosis.  11. No pericardial effusion seen.    < end of copied text >    LABS:	 	    06 Nov 2024 02:50    138    |  92     |  24     ----------------------------<  104    3.9     |  38     |  0.78   05 Nov 2024 03:15    139    |  93     |  24     ----------------------------<  109    3.8     |  39     |  0.78   04 Nov 2024 20:10    139    |  95     |  24     ----------------------------<  111    4.0     |  38     |  0.78     Ca    9.4        06 Nov 2024 02:50  Phos  3.4       06 Nov 2024 02:50  Mg     1.8       06 Nov 2024 02:50    TPro  6.7    /  Alb  2.8    /  TBili  1.1    /  DBili  x      /  AST  28     /  ALT  28     /  AlkPhos  62     06 Nov 2024 02:50                        14.0   10.31 )-----------( 205      ( 06 Nov 2024 02:50 )             45.8 ,                       13.5   10.80 )-----------( 187      ( 05 Nov 2024 03:15 )             44.5 ,                       13.1   11.70 )-----------( 185      ( 04 Nov 2024 05:50 )             43.1   pro-BNP: Pro-Brain Natriuretic Peptide: 3247 pg/mL (11.05.24 @ 03:15)    Thyroid Stimulating Hormone, Serum: 1.580 uIU/mL (10.31.24 @ 08:45)                     Patient is a 68y old  Male who presents with New onset Afib     PAST MEDICAL & SURGICAL HISTORY:  Hypertension    Gout    Hypercholesteremia    PVD    Morbid obesity    INTERVAL HISTORY: feeling better, denies any chest pain or palpitation   	  MEDICATIONS:  MEDICATIONS  (STANDING):  allopurinol 300 milliGRAM(s) Oral daily  apixaban 5 milliGRAM(s) Oral every 12 hours  artificial  tears Solution 1 Drop(s) Left EYE every 12 hours  aspirin  chewable 81 milliGRAM(s) Oral daily  buMETAnide IVPB 4 milliGRAM(s) IV Intermittent every 12 hours  chlorhexidine 2% Cloths 1 Application(s) Topical <User Schedule>  collagenase Ointment 1 Application(s) Topical daily  digoxin     Tablet 125 MICROGram(s) Oral daily  erythromycin   Ointment 1 Application(s) Left EYE every 8 hours  losartan 25 milliGRAM(s) Oral every 24 hours  metoprolol succinate  milliGRAM(s) Oral daily  polyethylene glycol 3350 17 Gram(s) Oral daily  rosuvastatin 5 milliGRAM(s) Oral at bedtime  senna 2 Tablet(s) Oral at bedtime  spironolactone 50 milliGRAM(s) Oral daily    MEDICATIONS  (PRN):  acetaminophen     Tablet .. 650 milliGRAM(s) Oral every 6 hours PRN Temp greater or equal to 38C (100.4F), Mild Pain (1 - 3)  benzocaine/menthol Lozenge 1 Lozenge Oral every 6 hours PRN Sore Throat  melatonin 6 milliGRAM(s) Oral at bedtime PRN Insomnia  ondansetron Injectable 4 milliGRAM(s) IV Push every 8 hours PRN Nausea and/or Vomiting    Vitals:  T(F): 97.7 (11-06-24 @ 07:15), Max: 98.4 (11-05-24 @ 16:00)  HR: 81 (11-06-24 @ 08:36) (76 - 112)  BP: 104/60 (11-06-24 @ 08:00) (96/70 - 134/84)  RR: 20 (11-06-24 @ 08:36) (0 - 28)  SpO2: 98% (11-06-24 @ 08:36) (88% - 98%)  Wt(kg): --153.5    11-05 @ 07:01  -  11-06 @ 07:00  --------------------------------------------------------  IN:    IV PiggyBack: 150 mL    Oral Fluid: 290 mL  Total IN: 440 mL    OUT:    Indwelling Catheter - Urethral (mL): 5565 mL  Total OUT: 5565 mL    Total NET: -5125 mL    PHYSICAL EXAM:  Neuro: Awake, responsive  CV: S1 S2 irreg irregular   Lungs: diminished to bases   GI: Soft, BS +, ND, NT  Extremities: +LE edema    TELEMETRY: afib    RADIOLOGY: < from: Xray Chest 1 View- PORTABLE-Routine (Xray Chest 1 View- PORTABLE-Routine in AM.) (11.05.24 @ 07:01) >  IMPRESSION:  Continued elevation of right hemidiaphragm with right   basilar opacity, likely a right pleural effusion with associated passive   atelectasis.    Resolution of left midlung masslike opacity.    Patchy left upper, mid, and lower lung opacities.    < end of copied text >  < from: CT Angio Chest PE Protocol w/ IV Cont (10.30.24 @ 23:13) >  Exam limited due to combination of respiratory motion artifact,   suboptimal contrast bolus timing and photon starvation. Within this   constraint:    No evidence of main/proximal segmental pulmonary embolus.    Left upper lobe peripheral airspace consolidation and 1.2 cm nodular   airspace consolidation in the left lower lobe. Differential diagnosis   includes pulmonary infarct, infection, inflammation or less likely   masslike consolidation. Follow-up to resolution recommended.    Small right pleural effusion.    < end of copied text >    DIAGNOSTIC TESTING:    [x ] Echocardiogram: < from: TTE Echo Complete w/ Contrast w/ Doppler (11.01.24 @ 10:28) >   1. Left ventricular systolic function is mildly to moderately decreased   with an ejection fraction visually estimated at 45 to 50 %.   2. There is severe (grade 3) left ventricular diastolic dysfunction.   3. Enlarged right ventricular cavity size and reduced right ventricular   systolic function.   4. Left atrium is mildly dilated.   5. Thickened mitral valve leaflets.   6. Mild mitral valve leaflet calcification.   7. There is mild calcification of the mitral valve annulus.   8. Trace mitral regurgitation.   9. Moderate tricuspid regurgitation.  10. Fibrocalcific aortic valve sclerosis without stenosis.  11. No pericardial effusion seen.    < end of copied text >    LABS:	 	    06 Nov 2024 02:50    138    |  92     |  24     ----------------------------<  104    3.9     |  38     |  0.78   05 Nov 2024 03:15    139    |  93     |  24     ----------------------------<  109    3.8     |  39     |  0.78   04 Nov 2024 20:10    139    |  95     |  24     ----------------------------<  111    4.0     |  38     |  0.78     Ca    9.4        06 Nov 2024 02:50  Phos  3.4       06 Nov 2024 02:50  Mg     1.8       06 Nov 2024 02:50    TPro  6.7    /  Alb  2.8    /  TBili  1.1    /  DBili  x      /  AST  28     /  ALT  28     /  AlkPhos  62     06 Nov 2024 02:50                        14.0   10.31 )-----------( 205      ( 06 Nov 2024 02:50 )             45.8 ,                       13.5   10.80 )-----------( 187      ( 05 Nov 2024 03:15 )             44.5 ,                       13.1   11.70 )-----------( 185      ( 04 Nov 2024 05:50 )             43.1   pro-BNP: Pro-Brain Natriuretic Peptide: 3247 pg/mL (11.05.24 @ 03:15)    Thyroid Stimulating Hormone, Serum: 1.580 uIU/mL (10.31.24 @ 08:45)

## 2024-11-06 NOTE — PROGRESS NOTE ADULT - SUBJECTIVE AND OBJECTIVE BOX
INTERVAL HPI/OVERNIGHT EVENTS:    Patient noted to be net negative 5 L.     CENTRAL LINE: [ ] YES [x ] NO  LOCATION:       BURCH: [x ] YES [ ] NO        A-LINE:  [ ] YES [ x] NO  LOCATION:       GLOBAL ISSUE/BEST PRACTICE:  Analgesia:   Sedation:  HOB elevation: yes  Stress ulcer prophylaxis:   VTE prophylaxis: apixaban 5 milliGRAM(s) Oral every 12 hours  aspirin  chewable 81 milliGRAM(s) Oral daily  Oral Care: Chlorhexidine  Glycemic control:   Nutrition:Diet, DASH/TLC:   Sodium & Cholesterol Restricted (10-31-24 @ 01:39) [Active]    REVIEW OF SYSTEMS:  [x] All other systems negative    CONSTITUTIONAL: No fever, weight loss, or fatigue  EYES: No eye pain, visual disturbances, or discharge  ENMT:  No difficulty hearing, tinnitus, vertigo; No sinus or throat pain  NECK: No pain or stiffness  RESPIRATORY: No cough, wheezing, chills or hemoptysis; No shortness of breath  CARDIOVASCULAR: No chest pain, palpitations, dizziness, or leg swelling  GASTROINTESTINAL: No abdominal or epigastric pain. No nausea, vomiting, or hematemesis; No diarrhea or constipation. No melena or hematochezia.  GENITOURINARY: No dysuria, frequency, hematuria, or incontinence  NEUROLOGICAL: No headaches, memory loss, loss of strength, numbness, or tremors  SKIN: No itching, burning, rashes, or lesions     PHYSICAL EXAM:    GENERAL: Obese, on high flow nasal cannula,   HEENT: NCAT, EOMI, PERRLA, conjunctiva and sclera clear; Moist mucous membranes  NECK: Supple  CHEST/LUNG: CTABL; No rales, rhonchi, wheezing, or rubs  HEART: S1S2, RRR, No murmurs, rubs, or gallops  ABDOMEN: Soft, obese, Nontender, Nondistended; Bowel sounds present  EXTREMITIES:  2+ Peripheral Pulses, 3+edema  NERVOUS SYSTEM:  Alert & Oriented X3, Good concentration; Motor Strength 5/5 B/L upper and lower extremities;      ICU Vital Signs Last 24 Hrs  T(C): 36.5 (06 Nov 2024 07:15), Max: 36.9 (05 Nov 2024 16:00)  T(F): 97.7 (06 Nov 2024 07:15), Max: 98.4 (05 Nov 2024 16:00)  HR: 102 (06 Nov 2024 09:00) (76 - 112)  BP: 112/53 (06 Nov 2024 09:00) (96/70 - 134/84)  BP(mean): 69 (06 Nov 2024 09:00) (69 - 102)  ABP: --  ABP(mean): --  RR: 26 (06 Nov 2024 09:00) (0 - 26)  SpO2: 100% (06 Nov 2024 09:00) (88% - 100%)    O2 Parameters below as of 06 Nov 2024 08:36  Patient On (Oxygen Delivery Method): nasal cannula, high flow  O2 Flow (L/min): 60  O2 Concentration (%): 100      I&O's Detail    05 Nov 2024 07:01  -  06 Nov 2024 07:00  --------------------------------------------------------  IN:    IV PiggyBack: 150 mL    Oral Fluid: 290 mL  Total IN: 440 mL    OUT:    Indwelling Catheter - Urethral (mL): 5665 mL  Total OUT: 5665 mL    Total NET: -5225 mL      06 Nov 2024 07:01  -  06 Nov 2024 10:43  --------------------------------------------------------  IN:  Total IN: 0 mL    OUT:    Indwelling Catheter - Urethral (mL): 125 mL  Total OUT: 125 mL    Total NET: -125 mL        MEDICATIONS  NEURO  Meds: acetaminophen     Tablet .. 650 milliGRAM(s) Oral every 6 hours PRN Temp greater or equal to 38C (100.4F), Mild Pain (1 - 3)  melatonin 6 milliGRAM(s) Oral at bedtime PRN Insomnia  ondansetron Injectable 4 milliGRAM(s) IV Push every 8 hours PRN Nausea and/or Vomiting    RESPIRATORY    Meds:   CARDIOVASCULAR  Meds: buMETAnide IVPB 4 milliGRAM(s) IV Intermittent every 12 hours  digoxin     Tablet 125 MICROGram(s) Oral daily  losartan 25 milliGRAM(s) Oral every 24 hours  metoprolol succinate  milliGRAM(s) Oral daily  spironolactone 50 milliGRAM(s) Oral daily    GI/NUTRITION  Meds: polyethylene glycol 3350 17 Gram(s) Oral daily  senna 2 Tablet(s) Oral at bedtime    GENITOURINARY  Meds:   HEMATOLOGIC  Meds: apixaban 5 milliGRAM(s) Oral every 12 hours  aspirin  chewable 81 milliGRAM(s) Oral daily    [x] VTE Prophylaxis  INFECTIOUS DISEASES  Meds: influenza  Vaccine (HIGH DOSE) 0.5 milliLiter(s) IntraMuscular once    ENDOCRINE  CAPILLARY BLOOD GLUCOSE        Meds:   OTHER MEDICATIONS:  artificial  tears Solution 1 Drop(s) Left EYE every 12 hours  benzocaine/menthol Lozenge 1 Lozenge Oral every 6 hours PRN  chlorhexidine 2% Cloths 1 Application(s) Topical <User Schedule>  collagenase Ointment 1 Application(s) Topical daily  erythromycin   Ointment 1 Application(s) Left EYE every 8 hours  :    LABS:                        14.0   10.31 )-----------( 205      ( 06 Nov 2024 02:50 )             45.8      11-06    138  |  92[L]  |  24[H]  ----------------------------<  104[H]  3.9   |  38[H]  |  0.78    Ca    9.4      06 Nov 2024 02:50  Phos  3.4     11-06  Mg     1.8     11-06    TPro  6.7  /  Alb  2.8[L]  /  TBili  1.1  /  DBili  x   /  AST  28  /  ALT  28  /  AlkPhos  62  11-06      Urinalysis Basic - ( 06 Nov 2024 02:50 )    Color: x / Appearance: x / SG: x / pH: x  Gluc: 104 mg/dL / Ketone: x  / Bili: x / Urobili: x   Blood: x / Protein: x / Nitrite: x   Leuk Esterase: x / RBC: x / WBC x   Sq Epi: x / Non Sq Epi: x / Bacteria: x                RADIOLOGY & ADDITIONAL STUDIES:

## 2024-11-06 NOTE — PROGRESS NOTE ADULT - NS ATTEND AMEND GEN_ALL_CORE FT
Acutely decompensated HFpEF with fluid overload.  AF with RVR. Now better controlled.  Loaded with Dig. Level 1.1. On Toprol.    Excellent diuresis. Edema sign decreased.    -Wean off HiFlo to nasal cannula when able, will need SHILOH evaluation.  -Should be seen by an EP dr after d/c for consideration of JOSUÉ/DCCV. Cont apixaban.  -Can likely switch Bumex to 3 mg po bid by Friday.

## 2024-11-06 NOTE — PROGRESS NOTE ADULT - ASSESSMENT
69 yo m pmhx obesity, HTN on metoprolol XL and amlodipine, HLD, PVD and gout admitted with new onset JOVAN, acute congestive heart failure, acute hypoxic respiratory failure, component of pulmonary edema, b/l LE edema with ulcerations of right leg.     NEURO: No active issues  CV: Atrial fibrillation, now rate controlled with digoxin.    - Continue with Lopressor 50mg tid and digoxin daily.  RESP: Acute hypoxic respiratory failure now on HFNC. Titrate FiO2 as tolerated, remains on 50L 70%.   - continues to diurese well on diamox, increased bumex 4mg BID  - Will continue with Diamox and bumex BID.   - Still overloaded on bedside POCUS IVC/Lung exam.  RENAL: Continues to diurese well with net neg 5500; strict I&Os, monitor electrolytes. replace lytes as needed. Creatinine normal/stable. trend BMP.  - Noted daily weight trend and net neg I/Os.  GI: Dash/TLC Diet  ENDO: No active issues  ID: observing off abx.  HEME: C/w eliquis fo afib  DISPO: Full code.

## 2024-11-06 NOTE — PROGRESS NOTE ADULT - ASSESSMENT
69 y/o male with a pmhx of Hypertension, Gout, Hypercholesteremia, and Morbid obesity admitted with Decompensated HF with fluid overload, and AF with RVR.    TTE with EF 45-50%,  grade III DD,  Enlarged right ventricular cavity size and reduced right ventricular systolic function. mod TR  off lasix drip and cardizem drip, HR 80-100s  BNP 5400 ->3200    -currently on Bumex 4 mg ivpb bid, aldactone 50 mg daily  -cont losartan 25/d   -Strict I&O, daily weights, diuresing well    -s/p Diamox dosing 11/3 for elevated bicarb   -Replete Mg to 2, and K+ to 4, monitor renal function closely  -cont on Toprol 150/d dig .125mg po daily, Dig level 1.12 (11/4)  -cont on Eliquis 5mg bid for AC  -Currently on HiFlo 70% FIO2, 50 L per/min   -increase activity as tolerated, PT  -outpatient follow up with EP cardiology Dr. Leonie Quinn, outpatient sleep study

## 2024-11-07 LAB
ALBUMIN SERPL ELPH-MCNC: 2.6 G/DL — LOW (ref 3.3–5)
ALBUMIN SERPL ELPH-MCNC: 2.8 G/DL — LOW (ref 3.3–5)
ALBUMIN SERPL ELPH-MCNC: 2.9 G/DL — LOW (ref 3.3–5)
ALP SERPL-CCNC: 66 U/L — SIGNIFICANT CHANGE UP (ref 40–120)
ALP SERPL-CCNC: 66 U/L — SIGNIFICANT CHANGE UP (ref 40–120)
ALP SERPL-CCNC: 73 U/L — SIGNIFICANT CHANGE UP (ref 40–120)
ALT FLD-CCNC: 33 U/L — SIGNIFICANT CHANGE UP (ref 12–78)
ALT FLD-CCNC: 35 U/L — SIGNIFICANT CHANGE UP (ref 12–78)
ALT FLD-CCNC: 40 U/L — SIGNIFICANT CHANGE UP (ref 12–78)
ANION GAP SERPL CALC-SCNC: 7 MMOL/L — SIGNIFICANT CHANGE UP (ref 5–17)
ANION GAP SERPL CALC-SCNC: 8 MMOL/L — SIGNIFICANT CHANGE UP (ref 5–17)
ANION GAP SERPL CALC-SCNC: 9 MMOL/L — SIGNIFICANT CHANGE UP (ref 5–17)
AST SERPL-CCNC: 38 U/L — HIGH (ref 15–37)
AST SERPL-CCNC: 50 U/L — HIGH (ref 15–37)
AST SERPL-CCNC: 50 U/L — HIGH (ref 15–37)
BILIRUB SERPL-MCNC: 0.8 MG/DL — SIGNIFICANT CHANGE UP (ref 0.2–1.2)
BILIRUB SERPL-MCNC: 0.9 MG/DL — SIGNIFICANT CHANGE UP (ref 0.2–1.2)
BILIRUB SERPL-MCNC: 0.9 MG/DL — SIGNIFICANT CHANGE UP (ref 0.2–1.2)
BUN SERPL-MCNC: 26 MG/DL — HIGH (ref 7–23)
BUN SERPL-MCNC: 28 MG/DL — HIGH (ref 7–23)
BUN SERPL-MCNC: 30 MG/DL — HIGH (ref 7–23)
CALCIUM SERPL-MCNC: 8.9 MG/DL — SIGNIFICANT CHANGE UP (ref 8.5–10.1)
CALCIUM SERPL-MCNC: 9.1 MG/DL — SIGNIFICANT CHANGE UP (ref 8.5–10.1)
CALCIUM SERPL-MCNC: 9.2 MG/DL — SIGNIFICANT CHANGE UP (ref 8.5–10.1)
CHLORIDE SERPL-SCNC: 92 MMOL/L — LOW (ref 96–108)
CHLORIDE SERPL-SCNC: 94 MMOL/L — LOW (ref 96–108)
CHLORIDE SERPL-SCNC: 95 MMOL/L — LOW (ref 96–108)
CO2 SERPL-SCNC: 33 MMOL/L — HIGH (ref 22–31)
CO2 SERPL-SCNC: 36 MMOL/L — HIGH (ref 22–31)
CO2 SERPL-SCNC: 40 MMOL/L — HIGH (ref 22–31)
CREAT SERPL-MCNC: 0.9 MG/DL — SIGNIFICANT CHANGE UP (ref 0.5–1.3)
CREAT SERPL-MCNC: 1.08 MG/DL — SIGNIFICANT CHANGE UP (ref 0.5–1.3)
CREAT SERPL-MCNC: 1.13 MG/DL — SIGNIFICANT CHANGE UP (ref 0.5–1.3)
EGFR: 70 ML/MIN/1.73M2 — SIGNIFICANT CHANGE UP
EGFR: 74 ML/MIN/1.73M2 — SIGNIFICANT CHANGE UP
EGFR: 92 ML/MIN/1.73M2 — SIGNIFICANT CHANGE UP
ELLIPTOCYTES BLD QL SMEAR: SLIGHT — SIGNIFICANT CHANGE UP
GLUCOSE SERPL-MCNC: 108 MG/DL — HIGH (ref 70–99)
GLUCOSE SERPL-MCNC: 116 MG/DL — HIGH (ref 70–99)
GLUCOSE SERPL-MCNC: 184 MG/DL — HIGH (ref 70–99)
HCT VFR BLD CALC: 46.7 % — SIGNIFICANT CHANGE UP (ref 39–50)
HGB BLD-MCNC: 14.2 G/DL — SIGNIFICANT CHANGE UP (ref 13–17)
MACROCYTES BLD QL: SLIGHT — SIGNIFICANT CHANGE UP
MAGNESIUM SERPL-MCNC: 1.9 MG/DL — SIGNIFICANT CHANGE UP (ref 1.6–2.6)
MAGNESIUM SERPL-MCNC: 2 MG/DL — SIGNIFICANT CHANGE UP (ref 1.6–2.6)
MAGNESIUM SERPL-MCNC: 2 MG/DL — SIGNIFICANT CHANGE UP (ref 1.6–2.6)
MANUAL SMEAR VERIFICATION: SIGNIFICANT CHANGE UP
MCHC RBC-ENTMCNC: 19.1 PG — LOW (ref 27–34)
MCHC RBC-ENTMCNC: 30.4 G/DL — LOW (ref 32–36)
MCV RBC AUTO: 62.9 FL — LOW (ref 80–100)
MICROCYTES BLD QL: SLIGHT — SIGNIFICANT CHANGE UP
NRBC # BLD: 0 /100 WBCS — SIGNIFICANT CHANGE UP (ref 0–0)
NT-PROBNP SERPL-SCNC: 1654 PG/ML — HIGH (ref 0–125)
OVALOCYTES BLD QL SMEAR: SLIGHT — SIGNIFICANT CHANGE UP
PHOSPHATE SERPL-MCNC: 3.6 MG/DL — SIGNIFICANT CHANGE UP (ref 2.5–4.5)
PHOSPHATE SERPL-MCNC: 3.7 MG/DL — SIGNIFICANT CHANGE UP (ref 2.5–4.5)
PHOSPHATE SERPL-MCNC: 3.8 MG/DL — SIGNIFICANT CHANGE UP (ref 2.5–4.5)
PLAT MORPH BLD: NORMAL — SIGNIFICANT CHANGE UP
PLATELET # BLD AUTO: 219 K/UL — SIGNIFICANT CHANGE UP (ref 150–400)
POTASSIUM SERPL-MCNC: 3.6 MMOL/L — SIGNIFICANT CHANGE UP (ref 3.5–5.3)
POTASSIUM SERPL-MCNC: 3.6 MMOL/L — SIGNIFICANT CHANGE UP (ref 3.5–5.3)
POTASSIUM SERPL-MCNC: 4 MMOL/L — SIGNIFICANT CHANGE UP (ref 3.5–5.3)
POTASSIUM SERPL-SCNC: 3.6 MMOL/L — SIGNIFICANT CHANGE UP (ref 3.5–5.3)
POTASSIUM SERPL-SCNC: 3.6 MMOL/L — SIGNIFICANT CHANGE UP (ref 3.5–5.3)
POTASSIUM SERPL-SCNC: 4 MMOL/L — SIGNIFICANT CHANGE UP (ref 3.5–5.3)
PROT SERPL-MCNC: 6.7 GM/DL — SIGNIFICANT CHANGE UP (ref 6–8.3)
PROT SERPL-MCNC: 6.9 GM/DL — SIGNIFICANT CHANGE UP (ref 6–8.3)
PROT SERPL-MCNC: 7.2 GM/DL — SIGNIFICANT CHANGE UP (ref 6–8.3)
RBC # BLD: 7.42 M/UL — HIGH (ref 4.2–5.8)
RBC # FLD: 18.1 % — HIGH (ref 10.3–14.5)
RBC BLD AUTO: ABNORMAL
SODIUM SERPL-SCNC: 136 MMOL/L — SIGNIFICANT CHANGE UP (ref 135–145)
SODIUM SERPL-SCNC: 138 MMOL/L — SIGNIFICANT CHANGE UP (ref 135–145)
SODIUM SERPL-SCNC: 140 MMOL/L — SIGNIFICANT CHANGE UP (ref 135–145)
STOMATOCYTES BLD QL SMEAR: SLIGHT — SIGNIFICANT CHANGE UP
WBC # BLD: 9.64 K/UL — SIGNIFICANT CHANGE UP (ref 3.8–10.5)
WBC # FLD AUTO: 9.64 K/UL — SIGNIFICANT CHANGE UP (ref 3.8–10.5)

## 2024-11-07 PROCEDURE — 99291 CRITICAL CARE FIRST HOUR: CPT

## 2024-11-07 PROCEDURE — 99232 SBSQ HOSP IP/OBS MODERATE 35: CPT

## 2024-11-07 RX ORDER — ACETAZOLAMIDE EXTENDED-RELEASE 500 MG/1
500 CAPSULE ORAL EVERY 12 HOURS
Refills: 0 | Status: COMPLETED | OUTPATIENT
Start: 2024-11-07 | End: 2024-11-07

## 2024-11-07 RX ORDER — POTASSIUM CHLORIDE 10 MEQ
40 TABLET, EXTENDED RELEASE ORAL ONCE
Refills: 0 | Status: COMPLETED | OUTPATIENT
Start: 2024-11-07 | End: 2024-11-07

## 2024-11-07 RX ADMIN — Medication 125 MICROGRAM(S): at 05:20

## 2024-11-07 RX ADMIN — Medication 5 MILLIGRAM(S): at 21:56

## 2024-11-07 RX ADMIN — ACETAZOLAMIDE EXTENDED-RELEASE 500 MILLIGRAM(S): 500 CAPSULE ORAL at 09:37

## 2024-11-07 RX ADMIN — Medication 1 DROP(S): at 17:31

## 2024-11-07 RX ADMIN — Medication 50 MILLIGRAM(S): at 05:19

## 2024-11-07 RX ADMIN — Medication 150 MILLIGRAM(S): at 06:35

## 2024-11-07 RX ADMIN — Medication 1 DROP(S): at 05:21

## 2024-11-07 RX ADMIN — ACETAZOLAMIDE EXTENDED-RELEASE 500 MILLIGRAM(S): 500 CAPSULE ORAL at 21:56

## 2024-11-07 RX ADMIN — ERYTHROMYCIN 1 APPLICATION(S): 5 OINTMENT OPHTHALMIC at 14:25

## 2024-11-07 RX ADMIN — APIXABAN 5 MILLIGRAM(S): 5 TABLET, FILM COATED ORAL at 17:31

## 2024-11-07 RX ADMIN — Medication 1 APPLICATION(S): at 12:09

## 2024-11-07 RX ADMIN — Medication 40 MILLIEQUIVALENT(S): at 09:37

## 2024-11-07 RX ADMIN — Medication 300 MILLIGRAM(S): at 12:17

## 2024-11-07 RX ADMIN — Medication 132 MILLIGRAM(S): at 12:04

## 2024-11-07 RX ADMIN — CHLORHEXIDINE GLUCONATE 1 APPLICATION(S): 40 SOLUTION TOPICAL at 12:12

## 2024-11-07 RX ADMIN — POLYETHYLENE GLYCOL 3350 17 GRAM(S): 17 POWDER, FOR SOLUTION ORAL at 12:04

## 2024-11-07 RX ADMIN — ERYTHROMYCIN 1 APPLICATION(S): 5 OINTMENT OPHTHALMIC at 23:18

## 2024-11-07 RX ADMIN — Medication 2 TABLET(S): at 21:57

## 2024-11-07 RX ADMIN — Medication 81 MILLIGRAM(S): at 12:04

## 2024-11-07 RX ADMIN — APIXABAN 5 MILLIGRAM(S): 5 TABLET, FILM COATED ORAL at 05:20

## 2024-11-07 RX ADMIN — Medication 6 MILLIGRAM(S): at 21:56

## 2024-11-07 RX ADMIN — Medication 40 MILLIEQUIVALENT(S): at 14:22

## 2024-11-07 RX ADMIN — ERYTHROMYCIN 1 APPLICATION(S): 5 OINTMENT OPHTHALMIC at 05:21

## 2024-11-07 NOTE — PROGRESS NOTE ADULT - CRITICAL CARE ATTENDING COMMENT
Remains on hiflow 65%; continue to titrate FiO2.
Actively diuresing; remains on high flow O2 for acute hypoxic respiratory failure, actively titrating FiO2.
Advancement Flap (Double) Text: The defect edges were debeveled with a #15 scalpel blade.  Given the location of the defect and the proximity to free margins a double advancement flap was deemed most appropriate.  Using a sterile surgical marker, the appropriate advancement flaps were drawn incorporating the defect and placing the expected incisions within the relaxed skin tension lines where possible.    The area thus outlined was incised deep to adipose tissue with a #15 scalpel blade.  The skin margins were undermined to an appropriate distance in all directions utilizing iris scissors.

## 2024-11-07 NOTE — PROGRESS NOTE ADULT - ASSESSMENT
69 y/o male with a pmhx of Hypertension, Gout, Hypercholesteremia, and Morbid obesity admitted with Decompensated HF with fluid overload, and AF with RVR.    TTE with EF 45-50%,  grade III DD,  Enlarged right ventricular cavity size and reduced right ventricular systolic function. mod TR  off lasix drip and cardizem drip, HR 80-100s  BNP 5400 ->3200->1600    -currently on Bumex 4 mg ivpb bid, aldactone 50 mg daily  -losartan 25/d   -Strict I&O, daily weights, diuresing well with significant weight loss  -Replete Mg to 2, and K+ to 4, monitor renal function closely, creat .9 today  -s/p Diamox dosing 11/6, bicarb 40 today   -cont on Toprol 150/d dig .125mg po daily, Dig level 1.12 (11/4)  -cont on Eliquis 5mg bid for AC  -Currently on HiFlo 70% FIO2, 50 L per/min   -increase activity as tolerated, PT. Incentive spirometry   -outpatient follow up with EP cardiology Dr. Leonie Quinn, outpatient sleep study

## 2024-11-07 NOTE — PROGRESS NOTE ADULT - ASSESSMENT
67 yo M with obesity, HTN on metoprolol XL and amlodipine, HLD, PVD and gout admitted with new onset JOVAN, acute congestive heart failure, acute hypoxic respiratory failure, component of pulmonary edema, b/l LE edema with ulcerations of right leg.     NEURO: No active issues  CV: Atrial fibrillation, now rate controlled with digoxin.    - Continue with Lopressor 50mg tid and digoxin daily.  RESP: Acute hypoxic respiratory failure now on HFNC. Titrate FiO2 as tolerated, remains on 50L 70%.   - continues to diurese well on diamox, increased bumex 4mg BID  - Will continue with Diamox (increased to 500mg BID) and bumex BID.   - improving edema  RENAL: Continues to diurese well with net neg 4160 (31 L for stay) strict I&Os, monitor electrolytes. replace lytes as needed. Creatinine normal/stable. Trend BMP.  - Noted daily weight trend and net neg I/Os.  GI: Dash/TLC Diet  ENDO: No active issues  ID: observing off abx.  HEME: C/w eliquis fo afib  DISPO: Full code.

## 2024-11-07 NOTE — PROGRESS NOTE ADULT - SUBJECTIVE AND OBJECTIVE BOX
INTERVAL HPI/OVERNIGHT EVENTS:    Patient out of bed this AM.  Noted to have improved breathing.      FiO2 down to 65% today, remains 50L.      Daily     Daily Weight in k.1 (2024 06:00)    CENTRAL LINE: [ ] YES [x ] NO  LOCATION:       BURCH: [x ] YES [ ] NO        A-LINE:  [ ] YES [ x] NO  LOCATION:       GLOBAL ISSUE/BEST PRACTICE:  Analgesia:   Sedation:  HOB elevation: yes  Stress ulcer prophylaxis:   VTE prophylaxis: apixaban 5 milliGRAM(s) Oral every 12 hours  aspirin  chewable 81 milliGRAM(s) Oral daily  Oral Care: Chlorhexidine  Glycemic control:   Nutrition:Diet, DASH/TLC:   Sodium & Cholesterol Restricted (10-31-24 @ 01:39) [Active]    REVIEW OF SYSTEMS:  [x] All other systems negative    PHYSICAL EXAM:    GENERAL: Obese, on high flow nasal cannula,   HEENT: NCAT, EOMI, PERRLA, conjunctiva and sclera clear; Moist mucous membranes  NECK: Supple  CHEST/LUNG: CTABL; No rales, rhonchi, wheezing, or rubs  HEART: S1S2, RRR, No murmurs, rubs, or gallops  ABDOMEN: Soft, obese, Nontender, Nondistended; Bowel sounds present  EXTREMITIES:  2+ Peripheral Pulses, 2+edema  NERVOUS SYSTEM:  Alert & Oriented X3, Good concentration; Motor Strength 5/5 B/L upper and lower extremities;      ICU Vital Signs Last 24 Hrs  T(C): 35.7 (2024 07:17), Max: 37.7 (2024 19:03)  T(F): 96.3 (2024 07:17), Max: 99.8 (2024 19:03)  HR: 88 (2024 11:00) (80 - 108)  BP: 97/69 (2024 11:00) (87/54 - 131/77)  BP(mean): 79 (2024 11:00) (65 - 94)  ABP: --  ABP(mean): --  RR: 24 (2024 11:00) (11 - 28)  SpO2: 92% (2024 11:00) (90% - 99%)    O2 Parameters below as of 2024 08:50  Patient On (Oxygen Delivery Method): nasal cannula, high flow, 31  O2 Flow (L/min): 50  O2 Concentration (%): 65      I&O's Detail    2024 07:01  -  2024 07:00  --------------------------------------------------------  IN:    IV PiggyBack: 100 mL    Oral Fluid: 820 mL  Total IN: 920 mL    OUT:    Indwelling Catheter - Urethral (mL): 5080 mL  Total OUT: 5080 mL    Total NET: -4160 mL      2024 07:01  -  2024 12:41  --------------------------------------------------------  IN:    Oral Fluid: 240 mL  Total IN: 240 mL    OUT:    Indwelling Catheter - Urethral (mL): 275 mL  Total OUT: 275 mL    Total NET: -35 mL        MEDICATIONS  NEURO  Meds: acetaminophen     Tablet .. 650 milliGRAM(s) Oral every 6 hours PRN Temp greater or equal to 38C (100.4F), Mild Pain (1 - 3)  melatonin 6 milliGRAM(s) Oral at bedtime PRN Insomnia  ondansetron Injectable 4 milliGRAM(s) IV Push every 8 hours PRN Nausea and/or Vomiting    RESPIRATORY    Meds:   CARDIOVASCULAR  Meds: acetaZOLAMIDE Injectable 500 milliGRAM(s) IV Push every 12 hours  buMETAnide IVPB 4 milliGRAM(s) IV Intermittent every 12 hours  digoxin     Tablet 125 MICROGram(s) Oral daily  losartan 25 milliGRAM(s) Oral every 24 hours  metoprolol succinate  milliGRAM(s) Oral daily  spironolactone 50 milliGRAM(s) Oral daily    GI/NUTRITION  Meds: polyethylene glycol 3350 17 Gram(s) Oral daily  senna 2 Tablet(s) Oral at bedtime    GENITOURINARY  Meds:   HEMATOLOGIC  Meds: apixaban 5 milliGRAM(s) Oral every 12 hours  aspirin  chewable 81 milliGRAM(s) Oral daily    [x] VTE Prophylaxis  INFECTIOUS DISEASES  Meds: influenza  Vaccine (HIGH DOSE) 0.5 milliLiter(s) IntraMuscular once    ENDOCRINE  CAPILLARY BLOOD GLUCOSE        Meds:   OTHER MEDICATIONS:  artificial  tears Solution 1 Drop(s) Left EYE every 12 hours  benzocaine/menthol Lozenge 1 Lozenge Oral every 6 hours PRN  chlorhexidine 2% Cloths 1 Application(s) Topical <User Schedule>  collagenase Ointment 1 Application(s) Topical daily  erythromycin   Ointment 1 Application(s) Left EYE every 8 hours  :    LABS:                        14.2   9.64  )-----------( 219      ( 2024 03:30 )             46.7      11-07    140  |  92[L]  |  26[H]  ----------------------------<  108[H]  3.6   |  40[H]  |  0.90    Ca    9.1      2024 03:30  Phos  3.6     11-07  Mg     1.9     11-    TPro  6.7  /  Alb  2.8[L]  /  TBili  0.9  /  DBili  x   /  AST  38[H]  /  ALT  33  /  AlkPhos  66  11-07      Urinalysis Basic - ( 2024 03:30 )    Color: x / Appearance: x / SG: x / pH: x  Gluc: 108 mg/dL / Ketone: x  / Bili: x / Urobili: x   Blood: x / Protein: x / Nitrite: x   Leuk Esterase: x / RBC: x / WBC x   Sq Epi: x / Non Sq Epi: x / Bacteria: x    RADIOLOGY & ADDITIONAL STUDIES:

## 2024-11-07 NOTE — PROGRESS NOTE ADULT - SUBJECTIVE AND OBJECTIVE BOX
Patient is a 68y old  Male who presents with New onset Afib     PAST MEDICAL & SURGICAL HISTORY:  Hypertension    Gout    Hypercholesteremia    PVD    Morbid obesity    INTERVAL HISTORY:  	  MEDICATIONS:  MEDICATIONS  (STANDING):  acetaZOLAMIDE Injectable 500 milliGRAM(s) IV Push every 12 hours  allopurinol 300 milliGRAM(s) Oral daily  apixaban 5 milliGRAM(s) Oral every 12 hours  artificial  tears Solution 1 Drop(s) Left EYE every 12 hours  aspirin  chewable 81 milliGRAM(s) Oral daily  buMETAnide IVPB 4 milliGRAM(s) IV Intermittent every 12 hours  chlorhexidine 2% Cloths 1 Application(s) Topical <User Schedule>  collagenase Ointment 1 Application(s) Topical daily  digoxin     Tablet 125 MICROGram(s) Oral daily  erythromycin   Ointment 1 Application(s) Left EYE every 8 hours  influenza  Vaccine (HIGH DOSE) 0.5 milliLiter(s) IntraMuscular once  losartan 25 milliGRAM(s) Oral every 24 hours  metoprolol succinate  milliGRAM(s) Oral daily  polyethylene glycol 3350 17 Gram(s) Oral daily  potassium chloride    Tablet ER 40 milliEquivalent(s) Oral once  rosuvastatin 5 milliGRAM(s) Oral at bedtime  senna 2 Tablet(s) Oral at bedtime  spironolactone 50 milliGRAM(s) Oral daily    MEDICATIONS  (PRN):  acetaminophen     Tablet .. 650 milliGRAM(s) Oral every 6 hours PRN Temp greater or equal to 38C (100.4F), Mild Pain (1 - 3)  benzocaine/menthol Lozenge 1 Lozenge Oral every 6 hours PRN Sore Throat  melatonin 6 milliGRAM(s) Oral at bedtime PRN Insomnia  ondansetron Injectable 4 milliGRAM(s) IV Push every 8 hours PRN Nausea and/or Vomiting    Vitals:  T(F): 96.3 (11-07-24 @ 07:17), Max: 99.8 (11-06-24 @ 19:03)  HR: 85 (11-07-24 @ 08:50) (82 - 108)  BP: 100/69 (11-07-24 @ 08:00) (85/71 - 131/77)  RR: 14 (11-07-24 @ 08:50) (11 - 28)  SpO2: 92% (11-07-24 @ 08:50) (90% - 99%)  Wt(kg): --150.1    11-06 @ 07:01  -  11-07 @ 07:00  --------------------------------------------------------  IN:    IV PiggyBack: 100 mL    Oral Fluid: 820 mL  Total IN: 920 mL    OUT:    Indwelling Catheter - Urethral (mL): 5080 mL  Total OUT: 5080 mL    Total NET: -4160 mL    PHYSICAL EXAM:  Neuro: Awake, responsive  CV: S1 S2 irreg irregular   Lungs: CTABL  GI: Soft, BS +, ND, NT  Extremities: +LE edema    TELEMETRY: afib    RADIOLOGY: < from: Xray Chest 1 View- PORTABLE-Routine (Xray Chest 1 View- PORTABLE-Routine in AM.) (11.05.24 @ 07:01) >  IMPRESSION:  Continued elevation of right hemidiaphragm with right   basilar opacity, likely a right pleural effusion with associated passive   atelectasis.    Resolution of left midlung masslike opacity.    Patchy left upper, mid, and lower lung opacities.    < end of copied text >    DIAGNOSTIC TESTING:    [x ] Echocardiogram: < from: TTE Echo Complete w/ Contrast w/ Doppler (11.01.24 @ 10:28) >   1. Left ventricular systolic function is mildly to moderately decreased   with an ejection fraction visually estimated at 45 to 50 %.   2. There is severe (grade 3) left ventricular diastolic dysfunction.   3. Enlarged right ventricular cavity size and reduced right ventricular   systolic function.   4. Left atrium is mildly dilated.   5. Thickened mitral valve leaflets.   6. Mild mitral valve leaflet calcification.   7. There is mild calcification of the mitral valve annulus.   8. Trace mitral regurgitation.   9. Moderate tricuspid regurgitation.  10. Fibrocalcific aortic valve sclerosis without stenosis.  11. No pericardial effusion seen.    < end of copied text >    LABS:	 	    07 Nov 2024 03:30    140    |  92     |  26     ----------------------------<  108    3.6     |  40     |  0.90   06 Nov 2024 02:50    138    |  92     |  24     ----------------------------<  104    3.9     |  38     |  0.78   05 Nov 2024 03:15    139    |  93     |  24     ----------------------------<  109    3.8     |  39     |  0.78     Ca    9.1        07 Nov 2024 03:30  Phos  3.6       07 Nov 2024 03:30  Mg     1.9       07 Nov 2024 03:30    TPro  6.7    /  Alb  2.8    /  TBili  0.9    /  DBili  x      /  AST  38     /  ALT  33     /  AlkPhos  66     07 Nov 2024 03:30                        14.2   9.64  )-----------( 219      ( 07 Nov 2024 03:30 )             46.7 ,                       14.0   10.31 )-----------( 205      ( 06 Nov 2024 02:50 )             45.8 ,                       13.5   10.80 )-----------( 187      ( 05 Nov 2024 03:15 )             44.5   pro-BNP: Pro-Brain Natriuretic Peptide: 3247 pg/mL (11.05.24 @ 03:15)                     Patient is a 68y old  Male who presents with New onset Afib     PAST MEDICAL & SURGICAL HISTORY:  Hypertension    Gout    Hypercholesteremia    PVD    Morbid obesity    INTERVAL HISTORY: in no acute distress, denies any chest pain, breathing comfortably on HFNC @70%  	  MEDICATIONS:  MEDICATIONS  (STANDING):  acetaZOLAMIDE Injectable 500 milliGRAM(s) IV Push every 12 hours  allopurinol 300 milliGRAM(s) Oral daily  apixaban 5 milliGRAM(s) Oral every 12 hours  artificial  tears Solution 1 Drop(s) Left EYE every 12 hours  aspirin  chewable 81 milliGRAM(s) Oral daily  buMETAnide IVPB 4 milliGRAM(s) IV Intermittent every 12 hours  chlorhexidine 2% Cloths 1 Application(s) Topical <User Schedule>  collagenase Ointment 1 Application(s) Topical daily  digoxin     Tablet 125 MICROGram(s) Oral daily  erythromycin   Ointment 1 Application(s) Left EYE every 8 hours  influenza  Vaccine (HIGH DOSE) 0.5 milliLiter(s) IntraMuscular once  losartan 25 milliGRAM(s) Oral every 24 hours  metoprolol succinate  milliGRAM(s) Oral daily  polyethylene glycol 3350 17 Gram(s) Oral daily  potassium chloride    Tablet ER 40 milliEquivalent(s) Oral once  rosuvastatin 5 milliGRAM(s) Oral at bedtime  senna 2 Tablet(s) Oral at bedtime  spironolactone 50 milliGRAM(s) Oral daily    MEDICATIONS  (PRN):  acetaminophen     Tablet .. 650 milliGRAM(s) Oral every 6 hours PRN Temp greater or equal to 38C (100.4F), Mild Pain (1 - 3)  benzocaine/menthol Lozenge 1 Lozenge Oral every 6 hours PRN Sore Throat  melatonin 6 milliGRAM(s) Oral at bedtime PRN Insomnia  ondansetron Injectable 4 milliGRAM(s) IV Push every 8 hours PRN Nausea and/or Vomiting    Vitals:  T(F): 96.3 (11-07-24 @ 07:17), Max: 99.8 (11-06-24 @ 19:03)  HR: 85 (11-07-24 @ 08:50) (82 - 108)  BP: 100/69 (11-07-24 @ 08:00) (85/71 - 131/77)  RR: 14 (11-07-24 @ 08:50) (11 - 28)  SpO2: 92% (11-07-24 @ 08:50) (90% - 99%)  Wt(kg): --150.1    11-06 @ 07:01  -  11-07 @ 07:00  --------------------------------------------------------  IN:    IV PiggyBack: 100 mL    Oral Fluid: 820 mL  Total IN: 920 mL    OUT:    Indwelling Catheter - Urethral (mL): 5080 mL  Total OUT: 5080 mL    Total NET: -4160 mL    PHYSICAL EXAM:  Neuro: Awake, responsive  CV: S1 S2 irreg irregular   Lungs: diminished to bases   GI: Soft, BS +, ND, NT  Extremities: +LE edema    TELEMETRY: afib    RADIOLOGY: < from: Xray Chest 1 View- PORTABLE-Routine (Xray Chest 1 View- PORTABLE-Routine in AM.) (11.05.24 @ 07:01) >  IMPRESSION:  Continued elevation of right hemidiaphragm with right   basilar opacity, likely a right pleural effusion with associated passive   atelectasis.    Resolution of left midlung masslike opacity.    Patchy left upper, mid, and lower lung opacities.    < end of copied text >    DIAGNOSTIC TESTING:    [x ] Echocardiogram: < from: TTE Echo Complete w/ Contrast w/ Doppler (11.01.24 @ 10:28) >   1. Left ventricular systolic function is mildly to moderately decreased   with an ejection fraction visually estimated at 45 to 50 %.   2. There is severe (grade 3) left ventricular diastolic dysfunction.   3. Enlarged right ventricular cavity size and reduced right ventricular   systolic function.   4. Left atrium is mildly dilated.   5. Thickened mitral valve leaflets.   6. Mild mitral valve leaflet calcification.   7. There is mild calcification of the mitral valve annulus.   8. Trace mitral regurgitation.   9. Moderate tricuspid regurgitation.  10. Fibrocalcific aortic valve sclerosis without stenosis.  11. No pericardial effusion seen.    < end of copied text >    LABS:	 	    07 Nov 2024 03:30    140    |  92     |  26     ----------------------------<  108    3.6     |  40     |  0.90   06 Nov 2024 02:50    138    |  92     |  24     ----------------------------<  104    3.9     |  38     |  0.78   05 Nov 2024 03:15    139    |  93     |  24     ----------------------------<  109    3.8     |  39     |  0.78     Ca    9.1        07 Nov 2024 03:30  Phos  3.6       07 Nov 2024 03:30  Mg     1.9       07 Nov 2024 03:30    TPro  6.7    /  Alb  2.8    /  TBili  0.9    /  DBili  x      /  AST  38     /  ALT  33     /  AlkPhos  66     07 Nov 2024 03:30                        14.2   9.64  )-----------( 219      ( 07 Nov 2024 03:30 )             46.7 ,                       14.0   10.31 )-----------( 205      ( 06 Nov 2024 02:50 )             45.8 ,                       13.5   10.80 )-----------( 187      ( 05 Nov 2024 03:15 )             44.5   pro-BNP: Pro-Brain Natriuretic Peptide: 3247 pg/mL (11.05.24 @ 03:15)  Pro-Brain Natriuretic Peptide: 1654 pg/mL (11.07.24 @ 09:57)

## 2024-11-07 NOTE — PROGRESS NOTE ADULT - NS ATTEND AMEND GEN_ALL_CORE FT
Acutely decompensated HFpEF with fluid overload.  AF with RVR. Now better controlled.  Loaded with Dig. Level 1.1. On Toprol.    Excellent diuresis. Edema sign decreased.  SCr increasing.    -Wean off HiFlo to nasal cannula when able, will need SHILOH evaluation.  -Should be seen by an EP dr after d/c for consideration of JOSUÉ/DCCV. Cont apixaban.  -Can switch Bumex 4 mg po qd. Acutely decompensated HFpEF with fluid overload.  AF with RVR. Now better controlled.  Loaded with Dig. Level 1.1. On Toprol.    Excellent diuresis. Edema sign decreased.  SCr increasing.    -Wean off HiFlo to nasal cannula when able, will need SHILOH evaluation.  -Should be seen by an EP dr after d/c for consideration of JOSUÉ/DCCV. Cont apixaban.  -Can switch Bumex 4 mg po qd.  -Repeat CXR.

## 2024-11-08 LAB
ALBUMIN SERPL ELPH-MCNC: 2.7 G/DL — LOW (ref 3.3–5)
ALP SERPL-CCNC: 65 U/L — SIGNIFICANT CHANGE UP (ref 40–120)
ALT FLD-CCNC: 40 U/L — SIGNIFICANT CHANGE UP (ref 12–78)
ANION GAP SERPL CALC-SCNC: 6 MMOL/L — SIGNIFICANT CHANGE UP (ref 5–17)
AST SERPL-CCNC: 52 U/L — HIGH (ref 15–37)
BASOPHILS # BLD AUTO: 0.08 K/UL — SIGNIFICANT CHANGE UP (ref 0–0.2)
BASOPHILS NFR BLD AUTO: 0.9 % — SIGNIFICANT CHANGE UP (ref 0–2)
BILIRUB SERPL-MCNC: 0.8 MG/DL — SIGNIFICANT CHANGE UP (ref 0.2–1.2)
BUN SERPL-MCNC: 33 MG/DL — HIGH (ref 7–23)
CALCIUM SERPL-MCNC: 9.1 MG/DL — SIGNIFICANT CHANGE UP (ref 8.5–10.1)
CHLORIDE SERPL-SCNC: 99 MMOL/L — SIGNIFICANT CHANGE UP (ref 96–108)
CO2 SERPL-SCNC: 33 MMOL/L — HIGH (ref 22–31)
CREAT SERPL-MCNC: 0.96 MG/DL — SIGNIFICANT CHANGE UP (ref 0.5–1.3)
DIGOXIN SERPL-MCNC: 0.71 NG/ML — LOW (ref 0.8–2)
EGFR: 86 ML/MIN/1.73M2 — SIGNIFICANT CHANGE UP
EOSINOPHIL # BLD AUTO: 0.56 K/UL — HIGH (ref 0–0.5)
EOSINOPHIL NFR BLD AUTO: 6.1 % — HIGH (ref 0–6)
GLUCOSE SERPL-MCNC: 103 MG/DL — HIGH (ref 70–99)
HCT VFR BLD CALC: 47.3 % — SIGNIFICANT CHANGE UP (ref 39–50)
HGB BLD-MCNC: 14.6 G/DL — SIGNIFICANT CHANGE UP (ref 13–17)
IMM GRANULOCYTES NFR BLD AUTO: 0.8 % — SIGNIFICANT CHANGE UP (ref 0–0.9)
LYMPHOCYTES # BLD AUTO: 2.29 K/UL — SIGNIFICANT CHANGE UP (ref 1–3.3)
LYMPHOCYTES # BLD AUTO: 24.8 % — SIGNIFICANT CHANGE UP (ref 13–44)
MAGNESIUM SERPL-MCNC: 2 MG/DL — SIGNIFICANT CHANGE UP (ref 1.6–2.6)
MCHC RBC-ENTMCNC: 19.4 PG — LOW (ref 27–34)
MCHC RBC-ENTMCNC: 30.9 G/DL — LOW (ref 32–36)
MCV RBC AUTO: 62.8 FL — LOW (ref 80–100)
MONOCYTES # BLD AUTO: 1.3 K/UL — HIGH (ref 0–0.9)
MONOCYTES NFR BLD AUTO: 14.1 % — HIGH (ref 2–14)
NEUTROPHILS # BLD AUTO: 4.92 K/UL — SIGNIFICANT CHANGE UP (ref 1.8–7.4)
NEUTROPHILS NFR BLD AUTO: 53.3 % — SIGNIFICANT CHANGE UP (ref 43–77)
NRBC # BLD: 0 /100 WBCS — SIGNIFICANT CHANGE UP (ref 0–0)
PHOSPHATE SERPL-MCNC: 3.6 MG/DL — SIGNIFICANT CHANGE UP (ref 2.5–4.5)
PLATELET # BLD AUTO: 212 K/UL — SIGNIFICANT CHANGE UP (ref 150–400)
POTASSIUM SERPL-MCNC: 4.2 MMOL/L — SIGNIFICANT CHANGE UP (ref 3.5–5.3)
POTASSIUM SERPL-SCNC: 4.2 MMOL/L — SIGNIFICANT CHANGE UP (ref 3.5–5.3)
PROT SERPL-MCNC: 7 GM/DL — SIGNIFICANT CHANGE UP (ref 6–8.3)
RBC # BLD: 7.53 M/UL — HIGH (ref 4.2–5.8)
RBC # FLD: 18.3 % — HIGH (ref 10.3–14.5)
SODIUM SERPL-SCNC: 138 MMOL/L — SIGNIFICANT CHANGE UP (ref 135–145)
WBC # BLD: 9.22 K/UL — SIGNIFICANT CHANGE UP (ref 3.8–10.5)
WBC # FLD AUTO: 9.22 K/UL — SIGNIFICANT CHANGE UP (ref 3.8–10.5)

## 2024-11-08 PROCEDURE — 71045 X-RAY EXAM CHEST 1 VIEW: CPT | Mod: 26

## 2024-11-08 PROCEDURE — 99291 CRITICAL CARE FIRST HOUR: CPT

## 2024-11-08 PROCEDURE — 99232 SBSQ HOSP IP/OBS MODERATE 35: CPT

## 2024-11-08 RX ADMIN — Medication 50 MILLIGRAM(S): at 05:17

## 2024-11-08 RX ADMIN — CHLORHEXIDINE GLUCONATE 1 APPLICATION(S): 40 SOLUTION TOPICAL at 05:18

## 2024-11-08 RX ADMIN — Medication 1 DROP(S): at 18:17

## 2024-11-08 RX ADMIN — Medication 300 MILLIGRAM(S): at 13:00

## 2024-11-08 RX ADMIN — Medication 5 MILLIGRAM(S): at 21:50

## 2024-11-08 RX ADMIN — APIXABAN 5 MILLIGRAM(S): 5 TABLET, FILM COATED ORAL at 18:17

## 2024-11-08 RX ADMIN — APIXABAN 5 MILLIGRAM(S): 5 TABLET, FILM COATED ORAL at 05:17

## 2024-11-08 RX ADMIN — Medication 1 DROP(S): at 05:18

## 2024-11-08 RX ADMIN — LOSARTAN POTASSIUM 25 MILLIGRAM(S): 25 TABLET ORAL at 13:00

## 2024-11-08 RX ADMIN — Medication 125 MICROGRAM(S): at 05:18

## 2024-11-08 RX ADMIN — Medication 6 MILLIGRAM(S): at 21:50

## 2024-11-08 RX ADMIN — Medication 2 TABLET(S): at 21:50

## 2024-11-08 RX ADMIN — Medication 1 APPLICATION(S): at 13:01

## 2024-11-08 RX ADMIN — ERYTHROMYCIN 1 APPLICATION(S): 5 OINTMENT OPHTHALMIC at 05:17

## 2024-11-08 RX ADMIN — Medication 4 MILLIGRAM(S): at 05:18

## 2024-11-08 RX ADMIN — POLYETHYLENE GLYCOL 3350 17 GRAM(S): 17 POWDER, FOR SOLUTION ORAL at 13:00

## 2024-11-08 RX ADMIN — ERYTHROMYCIN 1 APPLICATION(S): 5 OINTMENT OPHTHALMIC at 21:51

## 2024-11-08 RX ADMIN — ERYTHROMYCIN 1 APPLICATION(S): 5 OINTMENT OPHTHALMIC at 13:55

## 2024-11-08 RX ADMIN — Medication 81 MILLIGRAM(S): at 13:00

## 2024-11-08 NOTE — PROGRESS NOTE ADULT - NS ATTEND AMEND GEN_ALL_CORE FT
Acutely decompensated HFpEF with fluid overload.  AF with RVR. Now better controlled.  Loaded with Dig. On Toprol.    Excellent diuresis. Edema sign decreased.  Now on PO Bumex.    -Wean off HiFlo to nasal cannula when able; will need SHILOH evaluation.  -Should be seen by an EP dr after d/c for consideration of JOSUÉ/DCCV. Cont apixaban.  -F/u CXR report.

## 2024-11-08 NOTE — PROGRESS NOTE ADULT - ASSESSMENT
68M morbidly obese w/ HTN, HLD, PVD. Admitted w/ new onset Rapid Afib, decompensated HF, and acute hypoxemic respiratory failure. Pt is now 30L negative, improving significantly but still has LE edema. Remains on HFNC.     #Neuro - no active issues  #CV - decompensated heart failure now improving w/ aggressive diuresis, pt is >30L negative but he still has LE edema at his thighs, continue bumex 4 mg PO qd and spironolactone; no need for diamox at this time; continue losartan and metoprolol for BP control; rapid afib now rate controlled w/ digoxin  #Pulm - acute hypoxemic respiratory failure in setting of decompensated HF, remains on HFNC; it has been difficult to wean off O2, however pt relates that he has been told his SPO2 runs low, target SPO2 >88% and titrate down FiO2 as tolerated  #ID- no active infectious issues at this time; monitor off abx  #Renal/metabolic - normal renal function; monitor I/Os and electrolytes; keep net negative  #GI- PO diet, fluid restriction; continue bowel regimen  #Heme - no active issues  #Endo - BG acceptable  #PPx - apixiban for Afib  #Dispo- remains in ICU while on HFNC; prognosis guarded; full code    Plan of care discussed w/ pt and wife at bedside

## 2024-11-08 NOTE — CHART NOTE - NSCHARTNOTEFT_GEN_A_CORE
Assessment:  Pt seen in ICU, sitting out of bed in recliner, on HFNC, reports good appetite, c/o thirst.  Per pt., he was drinking more than a gallon of water, diet PTA: low sodium.  Pt adm c diagnosis of acute CHF, rapid Afib, c acute hypoxemic respiratory failure, significant LE edema.  Pt is full code.  PMH include morbid obesity, HTN, gout, HLD,   Pt provided c written and verbal education on heart failure nutrition therapy, fluid limitations and wt. monitoring guidelines.  Pt was able to teach back, but requesting increase in fluid allowance.    Factors impacting intake: [x ] none [ ] nausea  [ ] vomiting [ ] diarrhea [ ] constipation  [ ]chewing problems [ ] swallowing issues  [ ] other:     Diet Prescription: Diet, DASH/TLC:   Sodium & Cholesterol Restricted  1000mL Fluid Restriction ([id number]) (11-06-24 @ 09:41)    Intake: >75% of meals    Current Weight: 11/08, 149.3 LBS, 11/01, 170.8 kg, c wt. loss of 21.5 kg  ? wt. 10/31, 128.9 kg-> Per pt., his wt. PTA was 375 LBS(170.5 kg)  % Weight Change: 12.5%  11/08, 2+(mild) generalized edema noted  I/O: 1050/3090(-2040)    Pertinent Medications: MEDICATIONS  (STANDING):  allopurinol 300 milliGRAM(s) Oral daily  apixaban 5 milliGRAM(s) Oral every 12 hours  artificial  tears Solution 1 Drop(s) Left EYE every 12 hours  aspirin  chewable 81 milliGRAM(s) Oral daily  buMETAnide 4 milliGRAM(s) Oral daily  chlorhexidine 2% Cloths 1 Application(s) Topical <User Schedule>  collagenase Ointment 1 Application(s) Topical daily  digoxin     Tablet 125 MICROGram(s) Oral daily  erythromycin   Ointment 1 Application(s) Left EYE every 8 hours  influenza  Vaccine (HIGH DOSE) 0.5 milliLiter(s) IntraMuscular once  losartan 25 milliGRAM(s) Oral every 24 hours  metoprolol succinate  milliGRAM(s) Oral daily  polyethylene glycol 3350 17 Gram(s) Oral daily  rosuvastatin 5 milliGRAM(s) Oral at bedtime  senna 2 Tablet(s) Oral at bedtime  spironolactone 50 milliGRAM(s) Oral daily    MEDICATIONS  (PRN):  acetaminophen     Tablet .. 650 milliGRAM(s) Oral every 6 hours PRN Temp greater or equal to 38C (100.4F), Mild Pain (1 - 3)  benzocaine/menthol Lozenge 1 Lozenge Oral every 6 hours PRN Sore Throat  melatonin 6 milliGRAM(s) Oral at bedtime PRN Insomnia  ondansetron Injectable 4 milliGRAM(s) IV Push every 8 hours PRN Nausea and/or Vomiting    Pertinent Labs: 11-08 Na138 mmol/L Glu 103 mg/dL[H] K+ 4.2 mmol/L Cr  0.96 mg/dL BUN 33 mg/dL[H] 11-08 Phos 3.6 mg/dL 11-08 Alb 2.7 g/dL[L] 10-31 Chol 87 mg/dL LDL --    HDL 24 mg/dL[L] Trig 50 mg/dL11-08 ALT 40 U/L AST 52 U/L[H] Alkaline Phosphatase 65 U/L  10-31-24 @ 08:45 a1c 5.9<H-pre-diabetic range>   CAPILLARY BLOOD GLUCOSE    Skin:   No pressure ulcers noted  right leg cellulitis noted    Estimated Needs:   [ x] no change since previous assessment(11/01, except for fluid for acute CHF)  IBW: 75. 5 kg  Estimated Fluid Needs: ~9964-0843 ml(25-30 ml/kg IBW)  [ ] recalculated:     Previous Nutrition Diagnosis: (11/01)  Other: Decreased nutrient needs (sodium)  Etiology: physiological cause (heart failure, fluid retention) requiring decreased sodium intake  Signs/Symptoms: heart failure, edema  Goal/Expected Outcome: Pt to consume sodium controlled diet during LOS and to verbalize need for lifestyle change during LOS; met  Nutrition Diagnosis is [ x] ongoing  [ ] resolved [ ] not applicable       New Nutrition Diagnosis: [ x] not applicable     Interventions:   Recommend  [ x] Change Diet To: DASH/ TLC, consider increase fluids to 1500 ml/day   [ ] Nutrition Supplement  [ ] Nutrition Support  [ ] Other:     Monitoring and Evaluation:   [ x] PO intake [ x ] Tolerance to diet prescription [ x ] weights [ x ] labs[ x ] follow up per protocol  [ ] other:

## 2024-11-08 NOTE — PROGRESS NOTE ADULT - SUBJECTIVE AND OBJECTIVE BOX
Patient is a 68y old  Male who presents with New onset Afib & CHF    PAST MEDICAL & SURGICAL HISTORY:  Hypertension    Gout    Hypercholesteremia    Morbid obesity    PVD    INTERVAL HISTORY:  	  MEDICATIONS:  MEDICATIONS  (STANDING):  allopurinol 300 milliGRAM(s) Oral daily  apixaban 5 milliGRAM(s) Oral every 12 hours  artificial  tears Solution 1 Drop(s) Left EYE every 12 hours  aspirin  chewable 81 milliGRAM(s) Oral daily  buMETAnide 4 milliGRAM(s) Oral daily  chlorhexidine 2% Cloths 1 Application(s) Topical <User Schedule>  collagenase Ointment 1 Application(s) Topical daily  digoxin     Tablet 125 MICROGram(s) Oral daily  erythromycin   Ointment 1 Application(s) Left EYE every 8 hours  influenza  Vaccine (HIGH DOSE) 0.5 milliLiter(s) IntraMuscular once  losartan 25 milliGRAM(s) Oral every 24 hours  metoprolol succinate  milliGRAM(s) Oral daily  polyethylene glycol 3350 17 Gram(s) Oral daily  rosuvastatin 5 milliGRAM(s) Oral at bedtime  senna 2 Tablet(s) Oral at bedtime  spironolactone 50 milliGRAM(s) Oral daily    MEDICATIONS  (PRN):  acetaminophen     Tablet .. 650 milliGRAM(s) Oral every 6 hours PRN Temp greater or equal to 38C (100.4F), Mild Pain (1 - 3)  benzocaine/menthol Lozenge 1 Lozenge Oral every 6 hours PRN Sore Throat  melatonin 6 milliGRAM(s) Oral at bedtime PRN Insomnia  ondansetron Injectable 4 milliGRAM(s) IV Push every 8 hours PRN Nausea and/or Vomiting    Vitals:  T(F): 96.8 (11-08-24 @ 07:32), Max: 98.7 (11-07-24 @ 16:55)  HR: 103 (11-08-24 @ 09:00) (77 - 114)  BP: 123/70 (11-08-24 @ 09:00) (83/60 - 123/70)  RR: 18 (11-08-24 @ 09:00) (13 - 26)  SpO2: 95% (11-08-24 @ 09:00) (86% - 98%)  Wt(kg): --149.3    11-07 @ 07:01  -  11-08 @ 07:00  --------------------------------------------------------  IN:    IV PiggyBack: 50 mL    Oral Fluid: 1000 mL  Total IN: 1050 mL    OUT:    Indwelling Catheter - Urethral (mL): 3690 mL  Total OUT: 3690 mL    Total NET: -2640 mL    11-08 @ 07:01  -  11-08 @ 09:56  --------------------------------------------------------  IN:  Total IN: 0 mL    OUT:    Indwelling Catheter - Urethral (mL): 850 mL  Total OUT: 850 mL    Total NET: -850 mL    PHYSICAL EXAM:  Neuro: Awake, responsive  CV: S1 S2 irreg irregular   Lungs: CTABL  GI: Soft, BS +, ND, NT  Extremities: No edema    TELEMETRY: afib    RADIOLOGY: < from: Xray Chest 1 View- PORTABLE-Routine (Xray Chest 1 View- PORTABLE-Routine in AM.) (11.05.24 @ 07:01) >  IMPRESSION:  Continued elevation of right hemidiaphragm with right   basilar opacity, likely a right pleural effusion with associated passive   atelectasis.    Resolution of left midlung masslike opacity.    Patchy left upper, mid, and lower lung opacities.    < end of copied text >    DIAGNOSTIC TESTING:    [x ] Echocardiogram: < from: TTE Echo Complete w/ Contrast w/ Doppler (11.01.24 @ 10:28) >     1. Left ventricular systolic function is mildly to moderately decreased   with an ejection fraction visually estimated at 45 to 50 %.   2. There is severe (grade 3) left ventricular diastolic dysfunction.   3. Enlarged right ventricular cavity size and reduced right ventricular   systolic function.   4. Left atrium is mildly dilated.   5. Thickened mitral valve leaflets.   6. Mild mitral valve leaflet calcification.   7. There is mild calcification of the mitral valve annulus.   8. Trace mitral regurgitation.   9. Moderate tricuspid regurgitation.  10. Fibrocalcific aortic valve sclerosis without stenosis.  11. No pericardial effusion seen.    < end of copied text >    LABS:	 	    08 Nov 2024 03:20    138    |  99     |  33     ----------------------------<  103    4.2     |  33     |  0.96   07 Nov 2024 17:15    138    |  95     |  30     ----------------------------<  116    4.0     |  36     |  1.08   07 Nov 2024 09:57    136    |  94     |  28     ----------------------------<  184    3.6     |  33     |  1.13     Ca    9.1        08 Nov 2024 03:20  Phos  3.6       08 Nov 2024 03:20  Mg     2.0       08 Nov 2024 03:20    TPro  7.0    /  Alb  2.7    /  TBili  0.8    /  DBili  x      /  AST  52     /  ALT  40     /  AlkPhos  65     08 Nov 2024 03:20                        14.6   9.22  )-----------( 212      ( 08 Nov 2024 03:20 )             47.3 ,                       14.2   9.64  )-----------( 219      ( 07 Nov 2024 03:30 )             46.7 ,                       14.0   10.31 )-----------( 205      ( 06 Nov 2024 02:50 )             45.8   pro-BNP: Pro-Brain Natriuretic Peptide: 1654 pg/mL (11.07.24 @ 09:57)                     Patient is a 68y old  Male who presents with New onset Afib & CHF    PAST MEDICAL & SURGICAL HISTORY:  Hypertension    Gout    Hypercholesteremia    Morbid obesity    PVD    INTERVAL HISTORY: in no acute distress, denies any chest pain or sob    	  MEDICATIONS:  MEDICATIONS  (STANDING):  allopurinol 300 milliGRAM(s) Oral daily  apixaban 5 milliGRAM(s) Oral every 12 hours  artificial  tears Solution 1 Drop(s) Left EYE every 12 hours  aspirin  chewable 81 milliGRAM(s) Oral daily  buMETAnide 4 milliGRAM(s) Oral daily  chlorhexidine 2% Cloths 1 Application(s) Topical <User Schedule>  collagenase Ointment 1 Application(s) Topical daily  digoxin     Tablet 125 MICROGram(s) Oral daily  erythromycin   Ointment 1 Application(s) Left EYE every 8 hours  influenza  Vaccine (HIGH DOSE) 0.5 milliLiter(s) IntraMuscular once  losartan 25 milliGRAM(s) Oral every 24 hours  metoprolol succinate  milliGRAM(s) Oral daily  polyethylene glycol 3350 17 Gram(s) Oral daily  rosuvastatin 5 milliGRAM(s) Oral at bedtime  senna 2 Tablet(s) Oral at bedtime  spironolactone 50 milliGRAM(s) Oral daily    MEDICATIONS  (PRN):  acetaminophen     Tablet .. 650 milliGRAM(s) Oral every 6 hours PRN Temp greater or equal to 38C (100.4F), Mild Pain (1 - 3)  benzocaine/menthol Lozenge 1 Lozenge Oral every 6 hours PRN Sore Throat  melatonin 6 milliGRAM(s) Oral at bedtime PRN Insomnia  ondansetron Injectable 4 milliGRAM(s) IV Push every 8 hours PRN Nausea and/or Vomiting    Vitals:  T(F): 96.8 (11-08-24 @ 07:32), Max: 98.7 (11-07-24 @ 16:55)  HR: 103 (11-08-24 @ 09:00) (77 - 114)  BP: 123/70 (11-08-24 @ 09:00) (83/60 - 123/70)  RR: 18 (11-08-24 @ 09:00) (13 - 26)  SpO2: 95% (11-08-24 @ 09:00) (86% - 98%)  Wt(kg): --149.3    11-07 @ 07:01  -  11-08 @ 07:00  --------------------------------------------------------  IN:    IV PiggyBack: 50 mL    Oral Fluid: 1000 mL  Total IN: 1050 mL    OUT:    Indwelling Catheter - Urethral (mL): 3690 mL  Total OUT: 3690 mL    Total NET: -2640 mL    11-08 @ 07:01  -  11-08 @ 09:56  --------------------------------------------------------  IN:  Total IN: 0 mL    OUT:    Indwelling Catheter - Urethral (mL): 850 mL  Total OUT: 850 mL    Total NET: -850 mL    PHYSICAL EXAM:  Neuro: Awake, responsive  CV: S1 S2 irreg irregular   Lungs: diminished to bases   GI: Soft, BS +, ND, NT  Extremities: LE edema improving, Rt LE wound with dsg intact     TELEMETRY: afib    RADIOLOGY: < from: Xray Chest 1 View- PORTABLE-Routine (Xray Chest 1 View- PORTABLE-Routine in AM.) (11.05.24 @ 07:01) >  IMPRESSION:  Continued elevation of right hemidiaphragm with right   basilar opacity, likely a right pleural effusion with associated passive   atelectasis.    Resolution of left midlung masslike opacity.    Patchy left upper, mid, and lower lung opacities.    < end of copied text >    DIAGNOSTIC TESTING:    [x ] Echocardiogram: < from: TTE Echo Complete w/ Contrast w/ Doppler (11.01.24 @ 10:28) >     1. Left ventricular systolic function is mildly to moderately decreased   with an ejection fraction visually estimated at 45 to 50 %.   2. There is severe (grade 3) left ventricular diastolic dysfunction.   3. Enlarged right ventricular cavity size and reduced right ventricular   systolic function.   4. Left atrium is mildly dilated.   5. Thickened mitral valve leaflets.   6. Mild mitral valve leaflet calcification.   7. There is mild calcification of the mitral valve annulus.   8. Trace mitral regurgitation.   9. Moderate tricuspid regurgitation.  10. Fibrocalcific aortic valve sclerosis without stenosis.  11. No pericardial effusion seen.    < end of copied text >    LABS:	 	    08 Nov 2024 03:20    138    |  99     |  33     ----------------------------<  103    4.2     |  33     |  0.96   07 Nov 2024 17:15    138    |  95     |  30     ----------------------------<  116    4.0     |  36     |  1.08   07 Nov 2024 09:57    136    |  94     |  28     ----------------------------<  184    3.6     |  33     |  1.13     Ca    9.1        08 Nov 2024 03:20  Phos  3.6       08 Nov 2024 03:20  Mg     2.0       08 Nov 2024 03:20    TPro  7.0    /  Alb  2.7    /  TBili  0.8    /  DBili  x      /  AST  52     /  ALT  40     /  AlkPhos  65     08 Nov 2024 03:20                        14.6   9.22  )-----------( 212      ( 08 Nov 2024 03:20 )             47.3 ,                       14.2   9.64  )-----------( 219      ( 07 Nov 2024 03:30 )             46.7 ,                       14.0   10.31 )-----------( 205      ( 06 Nov 2024 02:50 )             45.8   pro-BNP: Pro-Brain Natriuretic Peptide: 1654 pg/mL (11.07.24 @ 09:57)

## 2024-11-08 NOTE — PROGRESS NOTE ADULT - SUBJECTIVE AND OBJECTIVE BOX
CHIEF COMPLAINT:    Interval Events:    REVIEW OF SYSTEMS:  Constitutional: [ ] fevers [ ] chills [ ] weight loss [ ] weight gain  HEENT: [ ] dry eyes [ ] eye irritation [ ] postnasal drip [ ] nasal congestion  CV: [ ] chest pain [ ] orthopnea [ ] palpitations [ ] murmur  Resp: [ ] cough [ ] shortness of breath [ ] dyspnea [ ] wheezing [ ] sputum [ ] hemoptysis  GI: [ ] nausea [ ] vomiting [ ] diarrhea [ ] constipation [ ] abd pain [ ] dysphagia   : [ ] dysuria [ ] nocturia [ ] hematuria [ ] increased urinary frequency  Musculoskeletal: [ ] back pain [ ] myalgias [ ] arthralgias [ ] fracture  Skin: [ ] rash [ ] itch  Neurological: [ ] headache [ ] dizziness [ ] syncope [ ] weakness [ ] numbness  Hematologic/Lymphatic: [ ] anemia [ ] bleeding problem  Allergic/Immunologic: [ ] itchy eyes [ ] nasal discharge [ ] hives [ ] angioedema  [ ] All other systems negative  [ ] Unable to assess ROS because ________    OBJECTIVE:  ICU Vital Signs Last 24 Hrs  T(C): 36 (08 Nov 2024 07:32), Max: 37.1 (07 Nov 2024 16:55)  T(F): 96.8 (08 Nov 2024 07:32), Max: 98.7 (07 Nov 2024 16:55)  HR: 100 (08 Nov 2024 07:02) (77 - 103)  BP: 113/75 (08 Nov 2024 07:02) (83/60 - 115/70)  BP(mean): 86 (08 Nov 2024 07:02) (67 - 93)  ABP: --  ABP(mean): --  RR: 24 (08 Nov 2024 07:02) (13 - 26)  SpO2: 93% (08 Nov 2024 07:02) (86% - 98%)    O2 Parameters below as of 08 Nov 2024 03:27  Patient On (Oxygen Delivery Method): nasal cannula, high flow  O2 Flow (L/min): 50  O2 Concentration (%): 65          11-07 @ 07:01  -  11-08 @ 07:00  --------------------------------------------------------  IN: 1050 mL / OUT: 3090 mL / NET: -2040 mL      CAPILLARY BLOOD GLUCOSE          PHYSICAL EXAM:  General:   HEENT:   Neck:   Respiratory:   Cardiovascular:   Abdomen:   Extremities:   Skin:   Neurological:  Psychiatry:    LINES:    HOSPITAL MEDICATIONS:  MEDICATIONS  (STANDING):  allopurinol 300 milliGRAM(s) Oral daily  apixaban 5 milliGRAM(s) Oral every 12 hours  artificial  tears Solution 1 Drop(s) Left EYE every 12 hours  aspirin  chewable 81 milliGRAM(s) Oral daily  buMETAnide 4 milliGRAM(s) Oral daily  chlorhexidine 2% Cloths 1 Application(s) Topical <User Schedule>  collagenase Ointment 1 Application(s) Topical daily  digoxin     Tablet 125 MICROGram(s) Oral daily  erythromycin   Ointment 1 Application(s) Left EYE every 8 hours  influenza  Vaccine (HIGH DOSE) 0.5 milliLiter(s) IntraMuscular once  losartan 25 milliGRAM(s) Oral every 24 hours  metoprolol succinate  milliGRAM(s) Oral daily  polyethylene glycol 3350 17 Gram(s) Oral daily  rosuvastatin 5 milliGRAM(s) Oral at bedtime  senna 2 Tablet(s) Oral at bedtime  spironolactone 50 milliGRAM(s) Oral daily    MEDICATIONS  (PRN):  acetaminophen     Tablet .. 650 milliGRAM(s) Oral every 6 hours PRN Temp greater or equal to 38C (100.4F), Mild Pain (1 - 3)  benzocaine/menthol Lozenge 1 Lozenge Oral every 6 hours PRN Sore Throat  melatonin 6 milliGRAM(s) Oral at bedtime PRN Insomnia  ondansetron Injectable 4 milliGRAM(s) IV Push every 8 hours PRN Nausea and/or Vomiting      LABS:                        14.6   9.22  )-----------( 212      ( 08 Nov 2024 03:20 )             47.3     Hgb Trend: 14.6<--, 14.2<--, 14.0<--, 13.5<--, 13.1<--  11-08    138  |  99  |  33[H]  ----------------------------<  103[H]  4.2   |  33[H]  |  0.96    Ca    9.1      08 Nov 2024 03:20  Phos  3.6     11-08  Mg     2.0     11-08    TPro  7.0  /  Alb  2.7[L]  /  TBili  0.8  /  DBili  x   /  AST  52[H]  /  ALT  40  /  AlkPhos  65  11-08      Urinalysis Basic - ( 08 Nov 2024 03:20 )    Color: x / Appearance: x / SG: x / pH: x  Gluc: 103 mg/dL / Ketone: x  / Bili: x / Urobili: x   Blood: x / Protein: x / Nitrite: x   Leuk Esterase: x / RBC: x / WBC x   Sq Epi: x / Non Sq Epi: x / Bacteria: x            MICROBIOLOGY:     RADIOLOGY:  [ ] Reviewed and interpreted by me CHIEF COMPLAINT:    Interval Events:  remains on HFNC 50L, 60%  wore BiPAP for a few hours  transitioned to oral diuretics yesterday    REVIEW OF SYSTEMS:  Constitutional: [- ] fevers [ -] chills [ ] weight loss [ ] weight gain  HEENT: [ ] dry eyes [ ] eye irritation [ ] postnasal drip [ ] nasal congestion  CV: [ -] chest pain [- ] orthopnea [- ] palpitations [ ] murmur  Resp: [ -] cough [ -] shortness of breath [- ] dyspnea [ -] wheezing [- ] sputum [ ] hemoptysis  GI: [ -] nausea [ -] vomiting [- ] diarrhea [- ] constipation [- ] abd pain [ ] dysphagia   : [ ] dysuria [ ] nocturia [ ] hematuria [ ] increased urinary frequency  Musculoskeletal: [- ] back pain [- ] myalgias [- ] arthralgias [ ] fracture  Skin: [ ] rash [ ] itch  Neurological: [ ] headache [ ] dizziness [ ] syncope [ ] weakness [ ] numbness  Hematologic/Lymphatic: [ ] anemia [ ] bleeding problem  Allergic/Immunologic: [ ] itchy eyes [ ] nasal discharge [ ] hives [ ] angioedema  [x ] All other systems negative    OBJECTIVE:  ICU Vital Signs Last 24 Hrs  T(C): 36 (08 Nov 2024 07:32), Max: 37.1 (07 Nov 2024 16:55)  T(F): 96.8 (08 Nov 2024 07:32), Max: 98.7 (07 Nov 2024 16:55)  HR: 100 (08 Nov 2024 07:02) (77 - 103)  BP: 113/75 (08 Nov 2024 07:02) (83/60 - 115/70)  BP(mean): 86 (08 Nov 2024 07:02) (67 - 93)  ABP: --  ABP(mean): --  RR: 24 (08 Nov 2024 07:02) (13 - 26)  SpO2: 93% (08 Nov 2024 07:02) (86% - 98%)    O2 Parameters below as of 08 Nov 2024 03:27  Patient On (Oxygen Delivery Method): nasal cannula, high flow  O2 Flow (L/min): 50  O2 Concentration (%): 65          11-07 @ 07:01  -  11-08 @ 07:00  --------------------------------------------------------  IN: 1050 mL / OUT: 3090 mL / NET: -2040 mL      CAPILLARY BLOOD GLUCOSE          PHYSICAL EXAM:  General: NAD, non toxic appearing  HEENT: dry MM, EOMI  Neck: supple  Respiratory: poor inspiratory effort   Cardiovascular: s1s2 RRR  Abdomen: soft, non tender, non distended  Extremities: warm, +1 pitting edema  Skin: RLE wound dressing CDI  Neurological: no focal deficits  Psychiatry: Tania fitzgerald    LINES:  [facility] MEDICATIONS:  MEDICATIONS  (STANDING):  allopurinol 300 milliGRAM(s) Oral daily  apixaban 5 milliGRAM(s) Oral every 12 hours  artificial  tears Solution 1 Drop(s) Left EYE every 12 hours  aspirin  chewable 81 milliGRAM(s) Oral daily  buMETAnide 4 milliGRAM(s) Oral daily  chlorhexidine 2% Cloths 1 Application(s) Topical <User Schedule>  collagenase Ointment 1 Application(s) Topical daily  digoxin     Tablet 125 MICROGram(s) Oral daily  erythromycin   Ointment 1 Application(s) Left EYE every 8 hours  influenza  Vaccine (HIGH DOSE) 0.5 milliLiter(s) IntraMuscular once  losartan 25 milliGRAM(s) Oral every 24 hours  metoprolol succinate  milliGRAM(s) Oral daily  polyethylene glycol 3350 17 Gram(s) Oral daily  rosuvastatin 5 milliGRAM(s) Oral at bedtime  senna 2 Tablet(s) Oral at bedtime  spironolactone 50 milliGRAM(s) Oral daily    MEDICATIONS  (PRN):  acetaminophen     Tablet .. 650 milliGRAM(s) Oral every 6 hours PRN Temp greater or equal to 38C (100.4F), Mild Pain (1 - 3)  benzocaine/menthol Lozenge 1 Lozenge Oral every 6 hours PRN Sore Throat  melatonin 6 milliGRAM(s) Oral at bedtime PRN Insomnia  ondansetron Injectable 4 milliGRAM(s) IV Push every 8 hours PRN Nausea and/or Vomiting      LABS:                        14.6   9.22  )-----------( 212      ( 08 Nov 2024 03:20 )             47.3     Hgb Trend: 14.6<--, 14.2<--, 14.0<--, 13.5<--, 13.1<--  11-08    138  |  99  |  33[H]  ----------------------------<  103[H]  4.2   |  33[H]  |  0.96    Ca    9.1      08 Nov 2024 03:20  Phos  3.6     11-08  Mg     2.0     11-08    TPro  7.0  /  Alb  2.7[L]  /  TBili  0.8  /  DBili  x   /  AST  52[H]  /  ALT  40  /  AlkPhos  65  11-08      Urinalysis Basic - ( 08 Nov 2024 03:20 )    Color: x / Appearance: x / SG: x / pH: x  Gluc: 103 mg/dL / Ketone: x  / Bili: x / Urobili: x   Blood: x / Protein: x / Nitrite: x   Leuk Esterase: x / RBC: x / WBC x   Sq Epi: x / Non Sq Epi: x / Bacteria: x            MICROBIOLOGY:     RADIOLOGY:  [ ] Reviewed and interpreted by me    < from: TTE Echo Complete w/ Contrast w/ Doppler (11.01.24 @ 10:28) >  CONCLUSIONS:     1. Left ventricular systolic function is mildly to moderately decreased   with an ejection fraction visually estimated at 45 to 50 %.   2. There is severe (grade 3) left ventricular diastolic dysfunction.   3. Enlarged right ventricular cavity size and reduced right ventricular   systolic function.   4. Left atrium is mildly dilated.   5. Thickened mitral valve leaflets.   6. Mild mitral valve leaflet calcification.   7. There is mild calcification of the mitral valve annulus.   8. Trace mitral regurgitation.   9. Moderate tricuspid regurgitation.  10. Fibrocalcific aortic valve sclerosis without stenosis.  11. No pericardial effusion seen.    < end of copied text >

## 2024-11-08 NOTE — PROGRESS NOTE ADULT - ASSESSMENT
69 y/o male with a pmhx of Hypertension, Gout, Hypercholesteremia, and Morbid obesity admitted with Decompensated HF with fluid overload, and AF with RVR.    TTE with EF 45-50%,  grade III DD,  Enlarged right ventricular cavity size and reduced right ventricular systolic function. mod TR  off lasix drip and cardizem drip, HR 80-110s  BNP 5400 ->3200->1600    -currently on Bumex 4 mg  po daily, aldactone 50 mg daily  -losartan 25 daily   -Strict I&O, daily weights, diuresing well with significant weight loss  -Replete Mg to 2, and K+ to 4, monitor renal function closely, creat .96 today  -s/p Diamox dosing periodically for elevated bicarb   -cont on Toprol 150/d dig .125mg po daily, Dig level .71 today   -cont on Eliquis 5mg bid for AC  -Currently on HiFlo 50% FIO2, 50 L per/min   -increase activity as tolerated, PT. Incentive spirometry   -outpatient follow up with EP cardiology Dr. Leonie Quinn, outpatient sleep study       69 y/o male with a pmhx of Hypertension, Gout, Hypercholesteremia, and Morbid obesity admitted with Decompensated HF with fluid overload, and AF with RVR.    TTE with EF 45-50%,  grade III DD,  Enlarged right ventricular cavity size and reduced right ventricular systolic function. mod TR  off lasix drip and cardizem drip, HR 80-110s  BNP 5400 ->3200->1600    -currently on Bumex 4 mg  po daily, aldactone 50 mg daily  -losartan 25 daily   -Strict I&O, daily weights, diuresing well with significant weight loss  -Replete Mg to 2, and K+ to 4, monitor renal function closely, creat .96 today  -s/p Diamox dosing periodically for elevated bicarb   -cont on Toprol 150/d dig .125mg po daily, Dig level .71 today   -cont on Eliquis 5mg bid for AC  -Currently on HFNC 50% FIO2, 50 L per/min, pt states the he has been told his SPO2 runs low, titrate down FiO2 as tolerated  -increase activity as tolerated, PT. Incentive spirometry   -outpatient follow up with EP cardiology Dr. Leonie Quinn, outpatient sleep study

## 2024-11-09 LAB
ALBUMIN SERPL ELPH-MCNC: 2.8 G/DL — LOW (ref 3.3–5)
ALBUMIN SERPL ELPH-MCNC: 2.8 G/DL — LOW (ref 3.3–5)
ALP SERPL-CCNC: 63 U/L — SIGNIFICANT CHANGE UP (ref 40–120)
ALP SERPL-CCNC: 74 U/L — SIGNIFICANT CHANGE UP (ref 40–120)
ALT FLD-CCNC: 44 U/L — SIGNIFICANT CHANGE UP (ref 12–78)
ALT FLD-CCNC: 51 U/L — SIGNIFICANT CHANGE UP (ref 12–78)
ANION GAP SERPL CALC-SCNC: 5 MMOL/L — SIGNIFICANT CHANGE UP (ref 5–17)
ANION GAP SERPL CALC-SCNC: 7 MMOL/L — SIGNIFICANT CHANGE UP (ref 5–17)
AST SERPL-CCNC: 47 U/L — HIGH (ref 15–37)
AST SERPL-CCNC: 62 U/L — HIGH (ref 15–37)
BASOPHILS # BLD AUTO: 0.08 K/UL — SIGNIFICANT CHANGE UP (ref 0–0.2)
BASOPHILS NFR BLD AUTO: 0.8 % — SIGNIFICANT CHANGE UP (ref 0–2)
BILIRUB SERPL-MCNC: 0.8 MG/DL — SIGNIFICANT CHANGE UP (ref 0.2–1.2)
BILIRUB SERPL-MCNC: 0.8 MG/DL — SIGNIFICANT CHANGE UP (ref 0.2–1.2)
BUN SERPL-MCNC: 34 MG/DL — HIGH (ref 7–23)
BUN SERPL-MCNC: 34 MG/DL — HIGH (ref 7–23)
CALCIUM SERPL-MCNC: 8.8 MG/DL — SIGNIFICANT CHANGE UP (ref 8.5–10.1)
CALCIUM SERPL-MCNC: 8.8 MG/DL — SIGNIFICANT CHANGE UP (ref 8.5–10.1)
CHLORIDE SERPL-SCNC: 100 MMOL/L — SIGNIFICANT CHANGE UP (ref 96–108)
CHLORIDE SERPL-SCNC: 99 MMOL/L — SIGNIFICANT CHANGE UP (ref 96–108)
CO2 SERPL-SCNC: 32 MMOL/L — HIGH (ref 22–31)
CO2 SERPL-SCNC: 33 MMOL/L — HIGH (ref 22–31)
CREAT SERPL-MCNC: 0.89 MG/DL — SIGNIFICANT CHANGE UP (ref 0.5–1.3)
CREAT SERPL-MCNC: 1.06 MG/DL — SIGNIFICANT CHANGE UP (ref 0.5–1.3)
EGFR: 76 ML/MIN/1.73M2 — SIGNIFICANT CHANGE UP
EGFR: 93 ML/MIN/1.73M2 — SIGNIFICANT CHANGE UP
EOSINOPHIL # BLD AUTO: 0.45 K/UL — SIGNIFICANT CHANGE UP (ref 0–0.5)
EOSINOPHIL NFR BLD AUTO: 4.6 % — SIGNIFICANT CHANGE UP (ref 0–6)
GLUCOSE SERPL-MCNC: 107 MG/DL — HIGH (ref 70–99)
GLUCOSE SERPL-MCNC: 126 MG/DL — HIGH (ref 70–99)
HCT VFR BLD CALC: 46.6 % — SIGNIFICANT CHANGE UP (ref 39–50)
HGB BLD-MCNC: 14.4 G/DL — SIGNIFICANT CHANGE UP (ref 13–17)
IMM GRANULOCYTES NFR BLD AUTO: 1.9 % — HIGH (ref 0–0.9)
LYMPHOCYTES # BLD AUTO: 2.21 K/UL — SIGNIFICANT CHANGE UP (ref 1–3.3)
LYMPHOCYTES # BLD AUTO: 22.7 % — SIGNIFICANT CHANGE UP (ref 13–44)
MAGNESIUM SERPL-MCNC: 1.9 MG/DL — SIGNIFICANT CHANGE UP (ref 1.6–2.6)
MAGNESIUM SERPL-MCNC: 2.2 MG/DL — SIGNIFICANT CHANGE UP (ref 1.6–2.6)
MCHC RBC-ENTMCNC: 19.4 PG — LOW (ref 27–34)
MCHC RBC-ENTMCNC: 30.9 G/DL — LOW (ref 32–36)
MCV RBC AUTO: 62.6 FL — LOW (ref 80–100)
MONOCYTES # BLD AUTO: 1.28 K/UL — HIGH (ref 0–0.9)
MONOCYTES NFR BLD AUTO: 13.2 % — SIGNIFICANT CHANGE UP (ref 2–14)
NEUTROPHILS # BLD AUTO: 5.52 K/UL — SIGNIFICANT CHANGE UP (ref 1.8–7.4)
NEUTROPHILS NFR BLD AUTO: 56.8 % — SIGNIFICANT CHANGE UP (ref 43–77)
NRBC # BLD: 0 /100 WBCS — SIGNIFICANT CHANGE UP (ref 0–0)
PHOSPHATE SERPL-MCNC: 3.2 MG/DL — SIGNIFICANT CHANGE UP (ref 2.5–4.5)
PHOSPHATE SERPL-MCNC: 3.6 MG/DL — SIGNIFICANT CHANGE UP (ref 2.5–4.5)
PLATELET # BLD AUTO: 253 K/UL — SIGNIFICANT CHANGE UP (ref 150–400)
POTASSIUM SERPL-MCNC: 3.5 MMOL/L — SIGNIFICANT CHANGE UP (ref 3.5–5.3)
POTASSIUM SERPL-MCNC: 4 MMOL/L — SIGNIFICANT CHANGE UP (ref 3.5–5.3)
POTASSIUM SERPL-SCNC: 3.5 MMOL/L — SIGNIFICANT CHANGE UP (ref 3.5–5.3)
POTASSIUM SERPL-SCNC: 4 MMOL/L — SIGNIFICANT CHANGE UP (ref 3.5–5.3)
PROT SERPL-MCNC: 7 GM/DL — SIGNIFICANT CHANGE UP (ref 6–8.3)
PROT SERPL-MCNC: 7.3 GM/DL — SIGNIFICANT CHANGE UP (ref 6–8.3)
RBC # BLD: 7.44 M/UL — HIGH (ref 4.2–5.8)
RBC # FLD: 18.1 % — HIGH (ref 10.3–14.5)
SODIUM SERPL-SCNC: 138 MMOL/L — SIGNIFICANT CHANGE UP (ref 135–145)
SODIUM SERPL-SCNC: 138 MMOL/L — SIGNIFICANT CHANGE UP (ref 135–145)
WBC # BLD: 9.72 K/UL — SIGNIFICANT CHANGE UP (ref 3.8–10.5)
WBC # FLD AUTO: 9.72 K/UL — SIGNIFICANT CHANGE UP (ref 3.8–10.5)

## 2024-11-09 PROCEDURE — 99233 SBSQ HOSP IP/OBS HIGH 50: CPT

## 2024-11-09 RX ORDER — MAGNESIUM SULFATE IN 0.9% NACL 2 G/50 ML
1 INTRAVENOUS SOLUTION, PIGGYBACK (ML) INTRAVENOUS ONCE
Refills: 0 | Status: COMPLETED | OUTPATIENT
Start: 2024-11-09 | End: 2024-11-09

## 2024-11-09 RX ORDER — POTASSIUM CHLORIDE 10 MEQ
40 TABLET, EXTENDED RELEASE ORAL ONCE
Refills: 0 | Status: COMPLETED | OUTPATIENT
Start: 2024-11-09 | End: 2024-11-09

## 2024-11-09 RX ADMIN — Medication 125 MICROGRAM(S): at 06:01

## 2024-11-09 RX ADMIN — ERYTHROMYCIN 1 APPLICATION(S): 5 OINTMENT OPHTHALMIC at 22:32

## 2024-11-09 RX ADMIN — Medication 40 MILLIEQUIVALENT(S): at 09:32

## 2024-11-09 RX ADMIN — APIXABAN 5 MILLIGRAM(S): 5 TABLET, FILM COATED ORAL at 17:34

## 2024-11-09 RX ADMIN — APIXABAN 5 MILLIGRAM(S): 5 TABLET, FILM COATED ORAL at 06:01

## 2024-11-09 RX ADMIN — Medication 81 MILLIGRAM(S): at 12:10

## 2024-11-09 RX ADMIN — POLYETHYLENE GLYCOL 3350 17 GRAM(S): 17 POWDER, FOR SOLUTION ORAL at 12:09

## 2024-11-09 RX ADMIN — Medication 2 TABLET(S): at 22:31

## 2024-11-09 RX ADMIN — LOSARTAN POTASSIUM 25 MILLIGRAM(S): 25 TABLET ORAL at 12:10

## 2024-11-09 RX ADMIN — Medication 1 APPLICATION(S): at 12:10

## 2024-11-09 RX ADMIN — Medication 50 MILLIGRAM(S): at 06:01

## 2024-11-09 RX ADMIN — CHLORHEXIDINE GLUCONATE 1 APPLICATION(S): 40 SOLUTION TOPICAL at 06:01

## 2024-11-09 RX ADMIN — Medication 300 MILLIGRAM(S): at 12:10

## 2024-11-09 RX ADMIN — ERYTHROMYCIN 1 APPLICATION(S): 5 OINTMENT OPHTHALMIC at 15:07

## 2024-11-09 RX ADMIN — Medication 4 MILLIGRAM(S): at 06:00

## 2024-11-09 RX ADMIN — Medication 5 MILLIGRAM(S): at 22:32

## 2024-11-09 RX ADMIN — ERYTHROMYCIN 1 APPLICATION(S): 5 OINTMENT OPHTHALMIC at 06:02

## 2024-11-09 RX ADMIN — Medication 100 GRAM(S): at 09:32

## 2024-11-09 RX ADMIN — Medication 1 DROP(S): at 06:02

## 2024-11-09 RX ADMIN — Medication 6 MILLIGRAM(S): at 22:31

## 2024-11-09 NOTE — PROGRESS NOTE ADULT - ASSESSMENT
68M morbidly obese w/ HTN, HLD, PVD. Admitted w/ new onset Rapid Afib, decompensated HF, and acute hypoxemic respiratory failure. Pt is now 30L negative, improving significantly but still has LE edema. Remains on HFNC.     #Neuro - no active issues  #CV - decompensated heart failure now improving w/ aggressive diuresis, pt is >30L negative but he still has LE edema at his thighs, continue bumex 4 mg PO qd and spironolactone; no need for diamox at this time; continue losartan and metoprolol for BP control; rapid afib now rate controlled w/ digoxin  #Pulm - acute hypoxemic respiratory failure in setting of decompensated HF, remains on HFNC; maintain O2 sat >88%, titrate down FiO2 as tolerated  #ID- no active infectious issues at this time; monitor off abx  #Renal/metabolic - normal renal function; hypoMg, HypoK repleted; monitor I/Os and electrolytes  #GI- PO diet, fluid restriction; continue bowel regimen  #Heme - no active issues  #Endo - BG acceptable  #PPx - apixiban for Afib  #Dispo- remains in ICU while on HFNC; prognosis guarded; full code    Plan of care discussed w/ pt at bedside

## 2024-11-09 NOTE — PROGRESS NOTE ADULT - SUBJECTIVE AND OBJECTIVE BOX
CHIEF COMPLAINT:    Interval Events:    REVIEW OF SYSTEMS:  Constitutional: [ ] fevers [ ] chills [ ] weight loss [ ] weight gain  HEENT: [ ] dry eyes [ ] eye irritation [ ] postnasal drip [ ] nasal congestion  CV: [ ] chest pain [ ] orthopnea [ ] palpitations [ ] murmur  Resp: [ ] cough [ ] shortness of breath [ ] dyspnea [ ] wheezing [ ] sputum [ ] hemoptysis  GI: [ ] nausea [ ] vomiting [ ] diarrhea [ ] constipation [ ] abd pain [ ] dysphagia   : [ ] dysuria [ ] nocturia [ ] hematuria [ ] increased urinary frequency  Musculoskeletal: [ ] back pain [ ] myalgias [ ] arthralgias [ ] fracture  Skin: [ ] rash [ ] itch  Neurological: [ ] headache [ ] dizziness [ ] syncope [ ] weakness [ ] numbness  Hematologic/Lymphatic: [ ] anemia [ ] bleeding problem  Allergic/Immunologic: [ ] itchy eyes [ ] nasal discharge [ ] hives [ ] angioedema  [ ] All other systems negative  [ ] Unable to assess ROS because ________    OBJECTIVE:  ICU Vital Signs Last 24 Hrs  T(C): 36.3 (09 Nov 2024 07:05), Max: 37.1 (08 Nov 2024 23:08)  T(F): 97.4 (09 Nov 2024 07:05), Max: 98.8 (08 Nov 2024 23:08)  HR: 90 (09 Nov 2024 07:10) (82 - 114)  BP: 108/71 (09 Nov 2024 07:00) (95/71 - 133/82)  BP(mean): 84 (09 Nov 2024 07:00) (67 - 95)  ABP: --  ABP(mean): --  RR: 17 (09 Nov 2024 07:10) (13 - 30)  SpO2: 92% (09 Nov 2024 07:10) (84% - 96%)    O2 Parameters below as of 09 Nov 2024 05:42  Patient On (Oxygen Delivery Method): nasal cannula, high flow, temp31%  O2 Flow (L/min): 50  O2 Concentration (%): 50          11-08 @ 07:01  -  11-09 @ 07:00  --------------------------------------------------------  IN: 1000 mL / OUT: 2990 mL / NET: -1990 mL      CAPILLARY BLOOD GLUCOSE          PHYSICAL EXAM:  General:   HEENT:   Neck:   Respiratory:   Cardiovascular:   Abdomen:   Extremities:   Skin:   Neurological:  Psychiatry:    LINES:    HOSPITAL MEDICATIONS:  MEDICATIONS  (STANDING):  allopurinol 300 milliGRAM(s) Oral daily  apixaban 5 milliGRAM(s) Oral every 12 hours  artificial  tears Solution 1 Drop(s) Left EYE every 12 hours  aspirin  chewable 81 milliGRAM(s) Oral daily  buMETAnide 4 milliGRAM(s) Oral daily  chlorhexidine 2% Cloths 1 Application(s) Topical <User Schedule>  collagenase Ointment 1 Application(s) Topical daily  digoxin     Tablet 125 MICROGram(s) Oral daily  erythromycin   Ointment 1 Application(s) Left EYE every 8 hours  influenza  Vaccine (HIGH DOSE) 0.5 milliLiter(s) IntraMuscular once  losartan 25 milliGRAM(s) Oral every 24 hours  metoprolol succinate  milliGRAM(s) Oral daily  polyethylene glycol 3350 17 Gram(s) Oral daily  rosuvastatin 5 milliGRAM(s) Oral at bedtime  senna 2 Tablet(s) Oral at bedtime  spironolactone 50 milliGRAM(s) Oral daily    MEDICATIONS  (PRN):  acetaminophen     Tablet .. 650 milliGRAM(s) Oral every 6 hours PRN Temp greater or equal to 38C (100.4F), Mild Pain (1 - 3)  benzocaine/menthol Lozenge 1 Lozenge Oral every 6 hours PRN Sore Throat  melatonin 6 milliGRAM(s) Oral at bedtime PRN Insomnia  ondansetron Injectable 4 milliGRAM(s) IV Push every 8 hours PRN Nausea and/or Vomiting      LABS:                        14.4   9.72  )-----------( 253      ( 09 Nov 2024 03:50 )             46.6     Hgb Trend: 14.4<--, 14.6<--, 14.2<--, 14.0<--, 13.5<--  11-09    138  |  99  |  34[H]  ----------------------------<  107[H]  3.5   |  32[H]  |  0.89    Ca    8.8      09 Nov 2024 03:50  Phos  3.6     11-09  Mg     1.9     11-09    TPro  7.0  /  Alb  2.8[L]  /  TBili  0.8  /  DBili  x   /  AST  47[H]  /  ALT  44  /  AlkPhos  63  11-09      Urinalysis Basic - ( 09 Nov 2024 03:50 )    Color: x / Appearance: x / SG: x / pH: x  Gluc: 107 mg/dL / Ketone: x  / Bili: x / Urobili: x   Blood: x / Protein: x / Nitrite: x   Leuk Esterase: x / RBC: x / WBC x   Sq Epi: x / Non Sq Epi: x / Bacteria: x            MICROBIOLOGY:     RADIOLOGY:  [ ] Reviewed and interpreted by me CHIEF COMPLAINT:    Interval Events:  remains on 50L, 50%  no other o/n events    REVIEW OF SYSTEMS:  Constitutional: [- ] fevers [ -] chills [ ] weight loss [ ] weight gain  HEENT: [ ] dry eyes [ ] eye irritation [ ] postnasal drip [ ] nasal congestion  CV: [ -] chest pain [- ] orthopnea [- ] palpitations [ ] murmur  Resp: [ -] cough [ -] shortness of breath [- ] dyspnea [ -] wheezing [- ] sputum [ ] hemoptysis  GI: [ -] nausea [ -] vomiting [- ] diarrhea [- ] constipation [- ] abd pain [ ] dysphagia   : [ ] dysuria [ ] nocturia [ ] hematuria [ ] increased urinary frequency  Musculoskeletal: [- ] back pain [- ] myalgias [- ] arthralgias [ ] fracture  Skin: [ ] rash [ ] itch  Neurological: [ ] headache [ ] dizziness [ ] syncope [ ] weakness [ ] numbness  Hematologic/Lymphatic: [ ] anemia [ ] bleeding problem  Allergic/Immunologic: [ ] itchy eyes [ ] nasal discharge [ ] hives [ ] angioedema  [x ] All other systems negative    OBJECTIVE:  ICU Vital Signs Last 24 Hrs  T(C): 36.3 (09 Nov 2024 07:05), Max: 37.1 (08 Nov 2024 23:08)  T(F): 97.4 (09 Nov 2024 07:05), Max: 98.8 (08 Nov 2024 23:08)  HR: 90 (09 Nov 2024 07:10) (82 - 114)  BP: 108/71 (09 Nov 2024 07:00) (95/71 - 133/82)  BP(mean): 84 (09 Nov 2024 07:00) (67 - 95)  ABP: --  ABP(mean): --  RR: 17 (09 Nov 2024 07:10) (13 - 30)  SpO2: 92% (09 Nov 2024 07:10) (84% - 96%)    O2 Parameters below as of 09 Nov 2024 05:42  Patient On (Oxygen Delivery Method): nasal cannula, high flow, temp31%  O2 Flow (L/min): 50  O2 Concentration (%): 50          11-08 @ 07:01  -  11-09 @ 07:00  --------------------------------------------------------  IN: 1000 mL / OUT: 2990 mL / NET: -1990 mL      CAPILLARY BLOOD GLUCOSE          PHYSICAL EXAM:  General: NAD, non toxic appearing  HEENT: dry MM, EOMI  Neck: supple  Respiratory: poor inspiratory effort   Cardiovascular: s1s2 RRR  Abdomen: soft, non tender, non distended  Extremities: warm, +1 pitting edema  Skin: RLE wound dressing CDI  Neurological: no focal deficits  Psychiatry: lia, Tania    LINES:  [facility] MEDICATIONS:  MEDICATIONS  (STANDING):  allopurinol 300 milliGRAM(s) Oral daily  apixaban 5 milliGRAM(s) Oral every 12 hours  artificial  tears Solution 1 Drop(s) Left EYE every 12 hours  aspirin  chewable 81 milliGRAM(s) Oral daily  buMETAnide 4 milliGRAM(s) Oral daily  chlorhexidine 2% Cloths 1 Application(s) Topical <User Schedule>  collagenase Ointment 1 Application(s) Topical daily  digoxin     Tablet 125 MICROGram(s) Oral daily  erythromycin   Ointment 1 Application(s) Left EYE every 8 hours  influenza  Vaccine (HIGH DOSE) 0.5 milliLiter(s) IntraMuscular once  losartan 25 milliGRAM(s) Oral every 24 hours  metoprolol succinate  milliGRAM(s) Oral daily  polyethylene glycol 3350 17 Gram(s) Oral daily  rosuvastatin 5 milliGRAM(s) Oral at bedtime  senna 2 Tablet(s) Oral at bedtime  spironolactone 50 milliGRAM(s) Oral daily    MEDICATIONS  (PRN):  acetaminophen     Tablet .. 650 milliGRAM(s) Oral every 6 hours PRN Temp greater or equal to 38C (100.4F), Mild Pain (1 - 3)  benzocaine/menthol Lozenge 1 Lozenge Oral every 6 hours PRN Sore Throat  melatonin 6 milliGRAM(s) Oral at bedtime PRN Insomnia  ondansetron Injectable 4 milliGRAM(s) IV Push every 8 hours PRN Nausea and/or Vomiting      LABS:                        14.4   9.72  )-----------( 253      ( 09 Nov 2024 03:50 )             46.6     Hgb Trend: 14.4<--, 14.6<--, 14.2<--, 14.0<--, 13.5<--  11-09    138  |  99  |  34[H]  ----------------------------<  107[H]  3.5   |  32[H]  |  0.89    Ca    8.8      09 Nov 2024 03:50  Phos  3.6     11-09  Mg     1.9     11-09    TPro  7.0  /  Alb  2.8[L]  /  TBili  0.8  /  DBili  x   /  AST  47[H]  /  ALT  44  /  AlkPhos  63  11-09      Urinalysis Basic - ( 09 Nov 2024 03:50 )    Color: x / Appearance: x / SG: x / pH: x  Gluc: 107 mg/dL / Ketone: x  / Bili: x / Urobili: x   Blood: x / Protein: x / Nitrite: x   Leuk Esterase: x / RBC: x / WBC x   Sq Epi: x / Non Sq Epi: x / Bacteria: x            MICROBIOLOGY:     RADIOLOGY:  [ ] Reviewed and interpreted by me

## 2024-11-10 LAB
ALBUMIN SERPL ELPH-MCNC: 3.1 G/DL — LOW (ref 3.3–5)
ALP SERPL-CCNC: 67 U/L — SIGNIFICANT CHANGE UP (ref 40–120)
ALT FLD-CCNC: 48 U/L — SIGNIFICANT CHANGE UP (ref 12–78)
ANION GAP SERPL CALC-SCNC: 6 MMOL/L — SIGNIFICANT CHANGE UP (ref 5–17)
ANISOCYTOSIS BLD QL: SLIGHT — SIGNIFICANT CHANGE UP
AST SERPL-CCNC: 52 U/L — HIGH (ref 15–37)
BASOPHILS # BLD AUTO: 0.1 K/UL — SIGNIFICANT CHANGE UP (ref 0–0.2)
BASOPHILS NFR BLD AUTO: 1 % — SIGNIFICANT CHANGE UP (ref 0–2)
BILIRUB SERPL-MCNC: 0.8 MG/DL — SIGNIFICANT CHANGE UP (ref 0.2–1.2)
BUN SERPL-MCNC: 33 MG/DL — HIGH (ref 7–23)
CALCIUM SERPL-MCNC: 9.3 MG/DL — SIGNIFICANT CHANGE UP (ref 8.5–10.1)
CHLORIDE SERPL-SCNC: 98 MMOL/L — SIGNIFICANT CHANGE UP (ref 96–108)
CO2 SERPL-SCNC: 34 MMOL/L — HIGH (ref 22–31)
CREAT SERPL-MCNC: 0.88 MG/DL — SIGNIFICANT CHANGE UP (ref 0.5–1.3)
EGFR: 93 ML/MIN/1.73M2 — SIGNIFICANT CHANGE UP
ELLIPTOCYTES BLD QL SMEAR: SIGNIFICANT CHANGE UP
EOSINOPHIL # BLD AUTO: 0.44 K/UL — SIGNIFICANT CHANGE UP (ref 0–0.5)
EOSINOPHIL NFR BLD AUTO: 4.4 % — SIGNIFICANT CHANGE UP (ref 0–6)
GLUCOSE SERPL-MCNC: 111 MG/DL — HIGH (ref 70–99)
HCT VFR BLD CALC: 47.9 % — SIGNIFICANT CHANGE UP (ref 39–50)
HGB BLD-MCNC: 14.8 G/DL — SIGNIFICANT CHANGE UP (ref 13–17)
HYPOCHROMIA BLD QL: SLIGHT — SIGNIFICANT CHANGE UP
IMM GRANULOCYTES NFR BLD AUTO: 0.8 % — SIGNIFICANT CHANGE UP (ref 0–0.9)
LYMPHOCYTES # BLD AUTO: 2.58 K/UL — SIGNIFICANT CHANGE UP (ref 1–3.3)
LYMPHOCYTES # BLD AUTO: 25.7 % — SIGNIFICANT CHANGE UP (ref 13–44)
MAGNESIUM SERPL-MCNC: 2 MG/DL — SIGNIFICANT CHANGE UP (ref 1.6–2.6)
MANUAL SMEAR VERIFICATION: SIGNIFICANT CHANGE UP
MCHC RBC-ENTMCNC: 19.3 PG — LOW (ref 27–34)
MCHC RBC-ENTMCNC: 30.9 G/DL — LOW (ref 32–36)
MCV RBC AUTO: 62.5 FL — LOW (ref 80–100)
MICROCYTES BLD QL: SLIGHT — SIGNIFICANT CHANGE UP
MONOCYTES # BLD AUTO: 1.22 K/UL — HIGH (ref 0–0.9)
MONOCYTES NFR BLD AUTO: 12.2 % — SIGNIFICANT CHANGE UP (ref 2–14)
NEUTROPHILS # BLD AUTO: 5.62 K/UL — SIGNIFICANT CHANGE UP (ref 1.8–7.4)
NEUTROPHILS NFR BLD AUTO: 55.9 % — SIGNIFICANT CHANGE UP (ref 43–77)
NRBC # BLD: 0 /100 WBCS — SIGNIFICANT CHANGE UP (ref 0–0)
NT-PROBNP SERPL-SCNC: 1200 PG/ML — HIGH (ref 0–125)
PHOSPHATE SERPL-MCNC: 3.8 MG/DL — SIGNIFICANT CHANGE UP (ref 2.5–4.5)
PLAT MORPH BLD: NORMAL — SIGNIFICANT CHANGE UP
PLATELET # BLD AUTO: 275 K/UL — SIGNIFICANT CHANGE UP (ref 150–400)
POIKILOCYTOSIS BLD QL AUTO: SIGNIFICANT CHANGE UP
POTASSIUM SERPL-MCNC: 3.7 MMOL/L — SIGNIFICANT CHANGE UP (ref 3.5–5.3)
POTASSIUM SERPL-SCNC: 3.7 MMOL/L — SIGNIFICANT CHANGE UP (ref 3.5–5.3)
PROT SERPL-MCNC: 7.2 GM/DL — SIGNIFICANT CHANGE UP (ref 6–8.3)
RBC # BLD: 7.66 M/UL — HIGH (ref 4.2–5.8)
RBC # FLD: 18.2 % — HIGH (ref 10.3–14.5)
RBC BLD AUTO: ABNORMAL
SODIUM SERPL-SCNC: 138 MMOL/L — SIGNIFICANT CHANGE UP (ref 135–145)
TARGETS BLD QL SMEAR: SIGNIFICANT CHANGE UP
WBC # BLD: 10.04 K/UL — SIGNIFICANT CHANGE UP (ref 3.8–10.5)
WBC # FLD AUTO: 10.04 K/UL — SIGNIFICANT CHANGE UP (ref 3.8–10.5)

## 2024-11-10 PROCEDURE — 99232 SBSQ HOSP IP/OBS MODERATE 35: CPT

## 2024-11-10 PROCEDURE — 99233 SBSQ HOSP IP/OBS HIGH 50: CPT

## 2024-11-10 RX ADMIN — APIXABAN 5 MILLIGRAM(S): 5 TABLET, FILM COATED ORAL at 05:04

## 2024-11-10 RX ADMIN — Medication 300 MILLIGRAM(S): at 12:56

## 2024-11-10 RX ADMIN — Medication 1 APPLICATION(S): at 12:57

## 2024-11-10 RX ADMIN — Medication 150 MILLIGRAM(S): at 05:06

## 2024-11-10 RX ADMIN — Medication 125 MICROGRAM(S): at 05:05

## 2024-11-10 RX ADMIN — LOSARTAN POTASSIUM 25 MILLIGRAM(S): 25 TABLET ORAL at 12:56

## 2024-11-10 RX ADMIN — ERYTHROMYCIN 1 APPLICATION(S): 5 OINTMENT OPHTHALMIC at 05:04

## 2024-11-10 RX ADMIN — Medication 1 DROP(S): at 18:48

## 2024-11-10 RX ADMIN — Medication 6 MILLIGRAM(S): at 22:45

## 2024-11-10 RX ADMIN — Medication 5 MILLIGRAM(S): at 22:45

## 2024-11-10 RX ADMIN — Medication 50 MILLIGRAM(S): at 05:04

## 2024-11-10 RX ADMIN — Medication 4 MILLIGRAM(S): at 05:04

## 2024-11-10 RX ADMIN — CHLORHEXIDINE GLUCONATE 1 APPLICATION(S): 40 SOLUTION TOPICAL at 05:06

## 2024-11-10 RX ADMIN — POLYETHYLENE GLYCOL 3350 17 GRAM(S): 17 POWDER, FOR SOLUTION ORAL at 12:56

## 2024-11-10 RX ADMIN — Medication 81 MILLIGRAM(S): at 12:56

## 2024-11-10 RX ADMIN — Medication 1 DROP(S): at 05:07

## 2024-11-10 RX ADMIN — APIXABAN 5 MILLIGRAM(S): 5 TABLET, FILM COATED ORAL at 18:48

## 2024-11-10 NOTE — PROGRESS NOTE ADULT - ASSESSMENT
68M morbidly obese w/ HTN, HLD, PVD. Admitted w/ new onset Rapid Afib, decompensated HF, and acute hypoxemic respiratory failure. Pt is now 30L negative, improving significantly but still has LE edema. Remains on HFNC, improving FiO2 requirements    #Neuro - no active issues  #CV - decompensated heart failure now improving w/ aggressive diuresis, pt is >35L negative, continue bumex 4 mg PO qd and spironolactone; no need for diamox at this time; continue losartan and metoprolol for BP control; rapid afib now rate controlled w/ digoxin  #Pulm - acute hypoxemic respiratory failure in setting of decompensated HF, remains on HFNC although decreased to 35% so will attempt green nasal canula today; maintain O2 sat >88%; BiPAP qhs  #ID- no active infectious issues at this time; monitor off abx  #Renal/metabolic - normal renal function; monitor I/Os and electrolytes  #GI- PO diet, fluid restriction; continue bowel regimen, add dulcolax  #Heme - no active issues  #Endo - BG acceptable  #PPx - apixiban for Afib  #Dispo- stable for transfer to medical floors w/ pulmonary f/u; prognosis guarded; full code    Plan of care discussed w/ pt at bedside, all questions answered

## 2024-11-10 NOTE — PROGRESS NOTE ADULT - SUBJECTIVE AND OBJECTIVE BOX
CHIEF COMPLAINT:    Interval Events:    REVIEW OF SYSTEMS:  Constitutional: [ ] fevers [ ] chills [ ] weight loss [ ] weight gain  HEENT: [ ] dry eyes [ ] eye irritation [ ] postnasal drip [ ] nasal congestion  CV: [ ] chest pain [ ] orthopnea [ ] palpitations [ ] murmur  Resp: [ ] cough [ ] shortness of breath [ ] dyspnea [ ] wheezing [ ] sputum [ ] hemoptysis  GI: [ ] nausea [ ] vomiting [ ] diarrhea [ ] constipation [ ] abd pain [ ] dysphagia   : [ ] dysuria [ ] nocturia [ ] hematuria [ ] increased urinary frequency  Musculoskeletal: [ ] back pain [ ] myalgias [ ] arthralgias [ ] fracture  Skin: [ ] rash [ ] itch  Neurological: [ ] headache [ ] dizziness [ ] syncope [ ] weakness [ ] numbness  Hematologic/Lymphatic: [ ] anemia [ ] bleeding problem  Allergic/Immunologic: [ ] itchy eyes [ ] nasal discharge [ ] hives [ ] angioedema  [ ] All other systems negative  [ ] Unable to assess ROS because ________    OBJECTIVE:  ICU Vital Signs Last 24 Hrs  T(C): 36.2 (10 Nov 2024 04:00), Max: 36.2 (09 Nov 2024 19:10)  T(F): 97.2 (10 Nov 2024 04:00), Max: 97.2 (09 Nov 2024 19:10)  HR: 102 (10 Nov 2024 07:10) (81 - 119)  BP: 110/72 (10 Nov 2024 07:00) (91/64 - 133/74)  BP(mean): 84 (10 Nov 2024 07:00) (65 - 101)  ABP: --  ABP(mean): --  RR: 19 (10 Nov 2024 07:10) (11 - 27)  SpO2: 96% (10 Nov 2024 07:10) (87% - 98%)    O2 Parameters below as of 10 Nov 2024 07:00  Patient On (Oxygen Delivery Method): nasal cannula, high flow  O2 Flow (L/min): 50  O2 Concentration (%): 40          11-09 @ 07:01  -  11-10 @ 07:00  --------------------------------------------------------  IN: 850 mL / OUT: 3115 mL / NET: -2265 mL      CAPILLARY BLOOD GLUCOSE          PHYSICAL EXAM:  General:   HEENT:   Neck:   Respiratory:   Cardiovascular:   Abdomen:   Extremities:   Skin:   Neurological:  Psychiatry:    LINES:    HOSPITAL MEDICATIONS:  MEDICATIONS  (STANDING):  allopurinol 300 milliGRAM(s) Oral daily  apixaban 5 milliGRAM(s) Oral every 12 hours  artificial  tears Solution 1 Drop(s) Left EYE every 12 hours  aspirin  chewable 81 milliGRAM(s) Oral daily  buMETAnide 4 milliGRAM(s) Oral daily  chlorhexidine 2% Cloths 1 Application(s) Topical <User Schedule>  collagenase Ointment 1 Application(s) Topical daily  digoxin     Tablet 125 MICROGram(s) Oral daily  erythromycin   Ointment 1 Application(s) Left EYE every 8 hours  influenza  Vaccine (HIGH DOSE) 0.5 milliLiter(s) IntraMuscular once  losartan 25 milliGRAM(s) Oral every 24 hours  metoprolol succinate  milliGRAM(s) Oral daily  polyethylene glycol 3350 17 Gram(s) Oral daily  rosuvastatin 5 milliGRAM(s) Oral at bedtime  senna 2 Tablet(s) Oral at bedtime  spironolactone 50 milliGRAM(s) Oral daily    MEDICATIONS  (PRN):  acetaminophen     Tablet .. 650 milliGRAM(s) Oral every 6 hours PRN Temp greater or equal to 38C (100.4F), Mild Pain (1 - 3)  benzocaine/menthol Lozenge 1 Lozenge Oral every 6 hours PRN Sore Throat  melatonin 6 milliGRAM(s) Oral at bedtime PRN Insomnia  ondansetron Injectable 4 milliGRAM(s) IV Push every 8 hours PRN Nausea and/or Vomiting      LABS:                        14.8   10.04 )-----------( 275      ( 10 Nov 2024 03:50 )             47.9     Hgb Trend: 14.8<--, 14.4<--, 14.6<--, 14.2<--, 14.0<--  11-10    138  |  98  |  33[H]  ----------------------------<  111[H]  3.7   |  34[H]  |  0.88    Ca    9.3      10 Nov 2024 03:50  Phos  3.8     11-10  Mg     2.0     11-10    TPro  7.2  /  Alb  3.1[L]  /  TBili  0.8  /  DBili  x   /  AST  52[H]  /  ALT  48  /  AlkPhos  67  11-10      Urinalysis Basic - ( 10 Nov 2024 03:50 )    Color: x / Appearance: x / SG: x / pH: x  Gluc: 111 mg/dL / Ketone: x  / Bili: x / Urobili: x   Blood: x / Protein: x / Nitrite: x   Leuk Esterase: x / RBC: x / WBC x   Sq Epi: x / Non Sq Epi: x / Bacteria: x            MICROBIOLOGY:     RADIOLOGY:  [ ] Reviewed and interpreted by me CHIEF COMPLAINT:    Interval Events:  down to 40% FIO2 on HFNC  continues to diurese well    REVIEW OF SYSTEMS:  Constitutional: [- ] fevers [ -] chills [ ] weight loss [ ] weight gain  HEENT: [ ] dry eyes [ ] eye irritation [ ] postnasal drip [ ] nasal congestion  CV: [ -] chest pain [- ] orthopnea [- ] palpitations [ ] murmur  Resp: [ -] cough [ -] shortness of breath [- ] dyspnea [ -] wheezing [- ] sputum [ ] hemoptysis  GI: [ -] nausea [ -] vomiting [- ] diarrhea [- ] constipation [- ] abd pain [ ] dysphagia   : [ ] dysuria [ ] nocturia [ ] hematuria [ ] increased urinary frequency  Musculoskeletal: [- ] back pain [- ] myalgias [- ] arthralgias [ ] fracture  Skin: [ ] rash [ ] itch  Neurological: [ ] headache [ ] dizziness [ ] syncope [ ] weakness [ ] numbness  Hematologic/Lymphatic: [ ] anemia [ ] bleeding problem  Allergic/Immunologic: [ ] itchy eyes [ ] nasal discharge [ ] hives [ ] angioedema  [x ] All other systems negative    OBJECTIVE:  ICU Vital Signs Last 24 Hrs  T(C): 36.2 (10 Nov 2024 04:00), Max: 36.2 (09 Nov 2024 19:10)  T(F): 97.2 (10 Nov 2024 04:00), Max: 97.2 (09 Nov 2024 19:10)  HR: 102 (10 Nov 2024 07:10) (81 - 119)  BP: 110/72 (10 Nov 2024 07:00) (91/64 - 133/74)  BP(mean): 84 (10 Nov 2024 07:00) (65 - 101)  ABP: --  ABP(mean): --  RR: 19 (10 Nov 2024 07:10) (11 - 27)  SpO2: 96% (10 Nov 2024 07:10) (87% - 98%)    O2 Parameters below as of 10 Nov 2024 07:00  Patient On (Oxygen Delivery Method): nasal cannula, high flow  O2 Flow (L/min): 50  O2 Concentration (%): 40          11-09 @ 07:01  -  11-10 @ 07:00  --------------------------------------------------------  IN: 850 mL / OUT: 3115 mL / NET: -2265 mL      CAPILLARY BLOOD GLUCOSE          PHYSICAL EXAM:  General: NAD, non toxic appearing  HEENT: dry MM, EOMI  Neck: supple  Respiratory: poor inspiratory effort   Cardiovascular: s1s2 RRR  Abdomen: soft, non tender, non distended  Extremities: warm, +1 pitting edema  Skin: RLE wound dressing CDI  Neurological: no focal deficits  Psychiatry: lia, Tania    LINES:  [facility] MEDICATIONS:  MEDICATIONS  (STANDING):  allopurinol 300 milliGRAM(s) Oral daily  apixaban 5 milliGRAM(s) Oral every 12 hours  artificial  tears Solution 1 Drop(s) Left EYE every 12 hours  aspirin  chewable 81 milliGRAM(s) Oral daily  buMETAnide 4 milliGRAM(s) Oral daily  chlorhexidine 2% Cloths 1 Application(s) Topical <User Schedule>  collagenase Ointment 1 Application(s) Topical daily  digoxin     Tablet 125 MICROGram(s) Oral daily  erythromycin   Ointment 1 Application(s) Left EYE every 8 hours  influenza  Vaccine (HIGH DOSE) 0.5 milliLiter(s) IntraMuscular once  losartan 25 milliGRAM(s) Oral every 24 hours  metoprolol succinate  milliGRAM(s) Oral daily  polyethylene glycol 3350 17 Gram(s) Oral daily  rosuvastatin 5 milliGRAM(s) Oral at bedtime  senna 2 Tablet(s) Oral at bedtime  spironolactone 50 milliGRAM(s) Oral daily    MEDICATIONS  (PRN):  acetaminophen     Tablet .. 650 milliGRAM(s) Oral every 6 hours PRN Temp greater or equal to 38C (100.4F), Mild Pain (1 - 3)  benzocaine/menthol Lozenge 1 Lozenge Oral every 6 hours PRN Sore Throat  melatonin 6 milliGRAM(s) Oral at bedtime PRN Insomnia  ondansetron Injectable 4 milliGRAM(s) IV Push every 8 hours PRN Nausea and/or Vomiting      LABS:                        14.8   10.04 )-----------( 275      ( 10 Nov 2024 03:50 )             47.9     Hgb Trend: 14.8<--, 14.4<--, 14.6<--, 14.2<--, 14.0<--  11-10    138  |  98  |  33[H]  ----------------------------<  111[H]  3.7   |  34[H]  |  0.88    Ca    9.3      10 Nov 2024 03:50  Phos  3.8     11-10  Mg     2.0     11-10    TPro  7.2  /  Alb  3.1[L]  /  TBili  0.8  /  DBili  x   /  AST  52[H]  /  ALT  48  /  AlkPhos  67  11-10      Urinalysis Basic - ( 10 Nov 2024 03:50 )    Color: x / Appearance: x / SG: x / pH: x  Gluc: 111 mg/dL / Ketone: x  / Bili: x / Urobili: x   Blood: x / Protein: x / Nitrite: x   Leuk Esterase: x / RBC: x / WBC x   Sq Epi: x / Non Sq Epi: x / Bacteria: x            MICROBIOLOGY:     RADIOLOGY:  [ ] Reviewed and interpreted by me

## 2024-11-10 NOTE — PROGRESS NOTE ADULT - NS ATTEST RISK PROBLEM GEN_ALL_CORE FT
morbid obesity, acute hypoxemic respiratory failure, rapid Afib, decompensated HF
morbid obesity, acute hypoxemic respiratory failure, rapid Afib, decompensated HF

## 2024-11-10 NOTE — CHART NOTE - NSCHARTNOTEFT_GEN_A_CORE
MICU DOWN GRADE NOTE      Patient is a 68y old  Male who presents with a chief complaint of New onset Afib & CHF (10 Nov 2024 07:56)      HPI: 68M morbidly obese w/ HTN, HLD, PVD. Admitted w/ new onset Rapid Afib, decompensated HF, and acute hypoxemic respiratory failure. Pt is now 30L negative, improving significantly but still has LE edema. Transferred to ICU for tenious respiratory status requiring high doses of HFNC and aggressive diuresis.     INTERVAL HPI/OVERNIGHT EVENTS: Weaned to 35% on HFNC, will transition to green NC. Continues to put out good urine output on PO diuretics       MEDICATIONS:  acetaminophen     Tablet .. 650 milliGRAM(s) Oral every 6 hours PRN  allopurinol 300 milliGRAM(s) Oral daily  apixaban 5 milliGRAM(s) Oral every 12 hours  artificial  tears Solution 1 Drop(s) Left EYE every 12 hours  aspirin  chewable 81 milliGRAM(s) Oral daily  benzocaine/menthol Lozenge 1 Lozenge Oral every 6 hours PRN  bisacodyl 5 milliGRAM(s) Oral every 12 hours PRN  buMETAnide 4 milliGRAM(s) Oral daily  chlorhexidine 2% Cloths 1 Application(s) Topical <User Schedule>  collagenase Ointment 1 Application(s) Topical daily  digoxin     Tablet 125 MICROGram(s) Oral daily  influenza  Vaccine (HIGH DOSE) 0.5 milliLiter(s) IntraMuscular once  losartan 25 milliGRAM(s) Oral every 24 hours  melatonin 6 milliGRAM(s) Oral at bedtime PRN  metoprolol succinate  milliGRAM(s) Oral daily  ondansetron Injectable 4 milliGRAM(s) IV Push every 8 hours PRN  polyethylene glycol 3350 17 Gram(s) Oral daily  rosuvastatin 5 milliGRAM(s) Oral at bedtime  senna 2 Tablet(s) Oral at bedtime  spironolactone 50 milliGRAM(s) Oral daily      T(C): 36.2 (11-10-24 @ 04:00), Max: 36.2 (11-09-24 @ 19:10)  HR: 83 (11-10-24 @ 10:00) (81 - 119)  BP: 93/64 (11-10-24 @ 10:00) (91/64 - 133/74)  RR: 19 (11-10-24 @ 10:00) (11 - 27)  SpO2: 89% (11-10-24 @ 10:00) (87% - 98%)  Wt(kg): --Vital Signs Last 24 Hrs  T(C): 36.2 (10 Nov 2024 04:00), Max: 36.2 (09 Nov 2024 19:10)  T(F): 97.2 (10 Nov 2024 04:00), Max: 97.2 (09 Nov 2024 19:10)  HR: 83 (10 Nov 2024 10:00) (81 - 119)  BP: 93/64 (10 Nov 2024 10:00) (91/64 - 133/74)  BP(mean): 75 (10 Nov 2024 10:00) (65 - 101)  RR: 19 (10 Nov 2024 10:00) (11 - 27)  SpO2: 89% (10 Nov 2024 10:00) (87% - 98%)    Parameters below as of 10 Nov 2024 08:39  Patient On (Oxygen Delivery Method): nasal cannula, high flow  O2 Flow (L/min): 50  O2 Concentration (%): 40    PHYSICAL EXAM:  General: NAD, non toxic appearing  HEENT: dry MM, EOMI  Neck: supple  Respiratory: poor inspiratory effort   Cardiovascular: s1s2 RRR  Abdomen: soft, non tender, non distended  Extremities: warm, +1 pitting edema  Skin: RLE wound dressing CDI  Neurological: no focal deficits  Psychiatry: pleasant, AAOx3    Consultant(s) Notes Reviewed:  [x ] YES  [ ] NO  Care Discussed with Consultants/Other Providers [ x] YES  [ ] NO    LABS:                        14.8   10.04 )-----------( 275      ( 10 Nov 2024 03:50 )             47.9     11-10    138  |  98  |  33[H]  ----------------------------<  111[H]  3.7   |  34[H]  |  0.88    Ca    9.3      10 Nov 2024 03:50  Phos  3.8     11-10  Mg     2.0     11-10    TPro  7.2  /  Alb  3.1[L]  /  TBili  0.8  /  DBili  x   /  AST  52[H]  /  ALT  48  /  AlkPhos  67  11-10      Urinalysis Basic - ( 10 Nov 2024 03:50 )    Color: x / Appearance: x / SG: x / pH: x  Gluc: 111 mg/dL / Ketone: x  / Bili: x / Urobili: x   Blood: x / Protein: x / Nitrite: x   Leuk Esterase: x / RBC: x / WBC x   Sq Epi: x / Non Sq Epi: x / Bacteria: x      CAPILLARY BLOOD GLUCOSE            Urinalysis Basic - ( 10 Nov 2024 03:50 )    Color: x / Appearance: x / SG: x / pH: x  Gluc: 111 mg/dL / Ketone: x  / Bili: x / Urobili: x   Blood: x / Protein: x / Nitrite: x   Leuk Esterase: x / RBC: x / WBC x   Sq Epi: x / Non Sq Epi: x / Bacteria: x        RADIOLOGY & ADDITIONAL TESTS:    Imaging Personally Reviewed:  [x ] YES  [ ] NO    To follow up:  #Neuro - no active issues, will need PT consult   #CV - decompensated heart failure now improving w/ aggressive diuresis, pt is >35L negative but he still has LE edema at his thighs, continue bumex 4 mg PO qd and spironolactone; no need for diamox at this time; continue losartan and metoprolol for BP control; rapid afib now rate controlled w/ digoxin. appreciate cards recs   #Pulm - acute hypoxemic respiratory failure in setting of decompensated HF, remains on HFNC however got down to 35% this AM will attempt green NC; maintain O2 sat >88%, titrate down FiO2 as tolerated. Pulm consulted MD Kat.   #ID- no active infectious issues at this time; monitor off abx  #Renal/metabolic - normal renal function; hypoMg, HypoK repleted; monitor I/Os and electrolytes. TOV.   #GI- PO diet, fluid restriction; continue bowel regimen add prn Doculax   #Heme - no active issues  #Endo - BG acceptable  #PPx - apixiban for Afib  #Dispo- Ok to downgrade to  telemtry floor for new afib monitoring. Discussed with ICU attending MD uJnior. Sign out to hospitalist. MICU DOWN GRADE NOTE      Patient is a 68y old  Male who presents with a chief complaint of New onset Afib & CHF (10 Nov 2024 07:56)      HPI: 68M morbidly obese w/ HTN, HLD, PVD. Admitted w/ new onset Rapid Afib, decompensated HF, and acute hypoxemic respiratory failure. Pt is now 30L negative, improving significantly but still has LE edema. Transferred to ICU for tenious respiratory status requiring high doses of HFNC and aggressive diuresis.     INTERVAL HPI/OVERNIGHT EVENTS: Weaned to 35% on HFNC, transitioned to green NC 5L tolerating well. Continues to put out good urine output on PO diuretics       MEDICATIONS:  acetaminophen     Tablet .. 650 milliGRAM(s) Oral every 6 hours PRN  allopurinol 300 milliGRAM(s) Oral daily  apixaban 5 milliGRAM(s) Oral every 12 hours  artificial  tears Solution 1 Drop(s) Left EYE every 12 hours  aspirin  chewable 81 milliGRAM(s) Oral daily  benzocaine/menthol Lozenge 1 Lozenge Oral every 6 hours PRN  bisacodyl 5 milliGRAM(s) Oral every 12 hours PRN  buMETAnide 4 milliGRAM(s) Oral daily  chlorhexidine 2% Cloths 1 Application(s) Topical <User Schedule>  collagenase Ointment 1 Application(s) Topical daily  digoxin     Tablet 125 MICROGram(s) Oral daily  influenza  Vaccine (HIGH DOSE) 0.5 milliLiter(s) IntraMuscular once  losartan 25 milliGRAM(s) Oral every 24 hours  melatonin 6 milliGRAM(s) Oral at bedtime PRN  metoprolol succinate  milliGRAM(s) Oral daily  ondansetron Injectable 4 milliGRAM(s) IV Push every 8 hours PRN  polyethylene glycol 3350 17 Gram(s) Oral daily  rosuvastatin 5 milliGRAM(s) Oral at bedtime  senna 2 Tablet(s) Oral at bedtime  spironolactone 50 milliGRAM(s) Oral daily      T(C): 36.2 (11-10-24 @ 04:00), Max: 36.2 (11-09-24 @ 19:10)  HR: 83 (11-10-24 @ 10:00) (81 - 119)  BP: 93/64 (11-10-24 @ 10:00) (91/64 - 133/74)  RR: 19 (11-10-24 @ 10:00) (11 - 27)  SpO2: 89% (11-10-24 @ 10:00) (87% - 98%)  Wt(kg): --Vital Signs Last 24 Hrs  T(C): 36.2 (10 Nov 2024 04:00), Max: 36.2 (09 Nov 2024 19:10)  T(F): 97.2 (10 Nov 2024 04:00), Max: 97.2 (09 Nov 2024 19:10)  HR: 83 (10 Nov 2024 10:00) (81 - 119)  BP: 93/64 (10 Nov 2024 10:00) (91/64 - 133/74)  BP(mean): 75 (10 Nov 2024 10:00) (65 - 101)  RR: 19 (10 Nov 2024 10:00) (11 - 27)  SpO2: 89% (10 Nov 2024 10:00) (87% - 98%)    Parameters below as of 10 Nov 2024 08:39  Patient On (Oxygen Delivery Method): nasal cannula, high flow  O2 Flow (L/min): 50  O2 Concentration (%): 40    PHYSICAL EXAM:  General: NAD, non toxic appearing  HEENT: dry MM, EOMI  Neck: supple  Respiratory: poor inspiratory effort   Cardiovascular: s1s2 RRR  Abdomen: soft, non tender, non distended  Extremities: warm, +1 pitting edema  Skin: RLE wound dressing CDI  Neurological: no focal deficits  Psychiatry: lia, AAOx3    Consultant(s) Notes Reviewed:  [x ] YES  [ ] NO  Care Discussed with Consultants/Other Providers [ x] YES  [ ] NO    LABS:                        14.8   10.04 )-----------( 275      ( 10 Nov 2024 03:50 )             47.9     11-10    138  |  98  |  33[H]  ----------------------------<  111[H]  3.7   |  34[H]  |  0.88    Ca    9.3      10 Nov 2024 03:50  Phos  3.8     11-10  Mg     2.0     11-10    TPro  7.2  /  Alb  3.1[L]  /  TBili  0.8  /  DBili  x   /  AST  52[H]  /  ALT  48  /  AlkPhos  67  11-10      Urinalysis Basic - ( 10 Nov 2024 03:50 )    Color: x / Appearance: x / SG: x / pH: x  Gluc: 111 mg/dL / Ketone: x  / Bili: x / Urobili: x   Blood: x / Protein: x / Nitrite: x   Leuk Esterase: x / RBC: x / WBC x   Sq Epi: x / Non Sq Epi: x / Bacteria: x      CAPILLARY BLOOD GLUCOSE            Urinalysis Basic - ( 10 Nov 2024 03:50 )    Color: x / Appearance: x / SG: x / pH: x  Gluc: 111 mg/dL / Ketone: x  / Bili: x / Urobili: x   Blood: x / Protein: x / Nitrite: x   Leuk Esterase: x / RBC: x / WBC x   Sq Epi: x / Non Sq Epi: x / Bacteria: x        RADIOLOGY & ADDITIONAL TESTS:    Imaging Personally Reviewed:  [x ] YES  [ ] NO    To follow up:  #Neuro - no active issues, will need PT consult   #CV - decompensated heart failure now improving w/ aggressive diuresis, pt is >35L negative but he still has LE edema at his thighs, continue bumex 4 mg PO qd and spironolactone; no need for diamox at this time; continue losartan and metoprolol for BP control; rapid afib now rate controlled w/ digoxin. appreciate cards recs   #Pulm - acute hypoxemic respiratory failure in setting of decompensated HF, titrated down HFNC  to 35% this AM and now on 5L NC; maintain O2 sat >88%, titrate down FiO2 as tolerated. Pulm consulted MD Kat.   #ID- no active infectious issues at this time; monitor off abx  #Renal/metabolic - normal renal function; hypoMg, HypoK repleted; monitor I/Os and electrolytes. TOV.   #GI- PO diet, fluid restriction; continue bowel regimen add prn Doculax   #Heme - no active issues  #Endo - BG acceptable  #PPx - apixiban for Afib  #Dispo- Ok to downgrade to  telemtry floor for new afib monitoring. Discussed with ICU attending MD Junior. Sign out to hospitalist.

## 2024-11-11 DIAGNOSIS — Z29.9 ENCOUNTER FOR PROPHYLACTIC MEASURES, UNSPECIFIED: ICD-10-CM

## 2024-11-11 DIAGNOSIS — E66.01 MORBID (SEVERE) OBESITY DUE TO EXCESS CALORIES: ICD-10-CM

## 2024-11-11 DIAGNOSIS — T14.8XXA OTHER INJURY OF UNSPECIFIED BODY REGION, INITIAL ENCOUNTER: ICD-10-CM

## 2024-11-11 DIAGNOSIS — J96.01 ACUTE RESPIRATORY FAILURE WITH HYPOXIA: ICD-10-CM

## 2024-11-11 LAB
ALBUMIN SERPL ELPH-MCNC: 2.8 G/DL — LOW (ref 3.3–5)
ALP SERPL-CCNC: 64 U/L — SIGNIFICANT CHANGE UP (ref 40–120)
ALT FLD-CCNC: 47 U/L — SIGNIFICANT CHANGE UP (ref 12–78)
ANION GAP SERPL CALC-SCNC: 5 MMOL/L — SIGNIFICANT CHANGE UP (ref 5–17)
AST SERPL-CCNC: 47 U/L — HIGH (ref 15–37)
BASOPHILS # BLD AUTO: 0.08 K/UL — SIGNIFICANT CHANGE UP (ref 0–0.2)
BASOPHILS NFR BLD AUTO: 0.8 % — SIGNIFICANT CHANGE UP (ref 0–2)
BILIRUB SERPL-MCNC: 0.8 MG/DL — SIGNIFICANT CHANGE UP (ref 0.2–1.2)
BUN SERPL-MCNC: 36 MG/DL — HIGH (ref 7–23)
CALCIUM SERPL-MCNC: 9 MG/DL — SIGNIFICANT CHANGE UP (ref 8.5–10.1)
CHLORIDE SERPL-SCNC: 99 MMOL/L — SIGNIFICANT CHANGE UP (ref 96–108)
CO2 SERPL-SCNC: 33 MMOL/L — HIGH (ref 22–31)
CREAT SERPL-MCNC: 0.93 MG/DL — SIGNIFICANT CHANGE UP (ref 0.5–1.3)
EGFR: 89 ML/MIN/1.73M2 — SIGNIFICANT CHANGE UP
EOSINOPHIL # BLD AUTO: 0.42 K/UL — SIGNIFICANT CHANGE UP (ref 0–0.5)
EOSINOPHIL NFR BLD AUTO: 4 % — SIGNIFICANT CHANGE UP (ref 0–6)
GLUCOSE SERPL-MCNC: 118 MG/DL — HIGH (ref 70–99)
HCT VFR BLD CALC: 48.5 % — SIGNIFICANT CHANGE UP (ref 39–50)
HGB BLD-MCNC: 14.9 G/DL — SIGNIFICANT CHANGE UP (ref 13–17)
IMM GRANULOCYTES NFR BLD AUTO: 1.2 % — HIGH (ref 0–0.9)
LYMPHOCYTES # BLD AUTO: 2.54 K/UL — SIGNIFICANT CHANGE UP (ref 1–3.3)
LYMPHOCYTES # BLD AUTO: 24 % — SIGNIFICANT CHANGE UP (ref 13–44)
MAGNESIUM SERPL-MCNC: 1.9 MG/DL — SIGNIFICANT CHANGE UP (ref 1.6–2.6)
MCHC RBC-ENTMCNC: 19.2 PG — LOW (ref 27–34)
MCHC RBC-ENTMCNC: 30.7 G/DL — LOW (ref 32–36)
MCV RBC AUTO: 62.4 FL — LOW (ref 80–100)
MONOCYTES # BLD AUTO: 1.23 K/UL — HIGH (ref 0–0.9)
MONOCYTES NFR BLD AUTO: 11.6 % — SIGNIFICANT CHANGE UP (ref 2–14)
NEUTROPHILS # BLD AUTO: 6.18 K/UL — SIGNIFICANT CHANGE UP (ref 1.8–7.4)
NEUTROPHILS NFR BLD AUTO: 58.4 % — SIGNIFICANT CHANGE UP (ref 43–77)
NRBC # BLD: 0 /100 WBCS — SIGNIFICANT CHANGE UP (ref 0–0)
NT-PROBNP SERPL-SCNC: 1378 PG/ML — HIGH (ref 0–125)
PHOSPHATE SERPL-MCNC: 3.8 MG/DL — SIGNIFICANT CHANGE UP (ref 2.5–4.5)
PLATELET # BLD AUTO: 262 K/UL — SIGNIFICANT CHANGE UP (ref 150–400)
POTASSIUM SERPL-MCNC: 3.6 MMOL/L — SIGNIFICANT CHANGE UP (ref 3.5–5.3)
POTASSIUM SERPL-SCNC: 3.6 MMOL/L — SIGNIFICANT CHANGE UP (ref 3.5–5.3)
PROT SERPL-MCNC: 6.9 GM/DL — SIGNIFICANT CHANGE UP (ref 6–8.3)
RBC # BLD: 7.77 M/UL — HIGH (ref 4.2–5.8)
RBC # FLD: 18 % — HIGH (ref 10.3–14.5)
SODIUM SERPL-SCNC: 137 MMOL/L — SIGNIFICANT CHANGE UP (ref 135–145)
WBC # BLD: 10.58 K/UL — HIGH (ref 3.8–10.5)
WBC # FLD AUTO: 10.58 K/UL — HIGH (ref 3.8–10.5)

## 2024-11-11 PROCEDURE — 99223 1ST HOSP IP/OBS HIGH 75: CPT

## 2024-11-11 PROCEDURE — 99233 SBSQ HOSP IP/OBS HIGH 50: CPT

## 2024-11-11 RX ORDER — POTASSIUM CHLORIDE 10 MEQ
40 TABLET, EXTENDED RELEASE ORAL ONCE
Refills: 0 | Status: COMPLETED | OUTPATIENT
Start: 2024-11-11 | End: 2024-11-11

## 2024-11-11 RX ORDER — DIGOXIN 250 MCG
250 TABLET ORAL DAILY
Refills: 0 | Status: DISCONTINUED | OUTPATIENT
Start: 2024-11-12 | End: 2024-11-14

## 2024-11-11 RX ORDER — METOPROLOL TARTRATE 50 MG
50 TABLET ORAL
Refills: 0 | Status: DISCONTINUED | OUTPATIENT
Start: 2024-11-12 | End: 2024-11-14

## 2024-11-11 RX ORDER — METOPROLOL TARTRATE 50 MG
50 TABLET ORAL ONCE
Refills: 0 | Status: COMPLETED | OUTPATIENT
Start: 2024-11-11 | End: 2024-11-11

## 2024-11-11 RX ADMIN — CHLORHEXIDINE GLUCONATE 1 APPLICATION(S): 40 SOLUTION TOPICAL at 05:33

## 2024-11-11 RX ADMIN — Medication 4 MILLIGRAM(S): at 05:26

## 2024-11-11 RX ADMIN — Medication 5 MILLIGRAM(S): at 22:07

## 2024-11-11 RX ADMIN — POLYETHYLENE GLYCOL 3350 17 GRAM(S): 17 POWDER, FOR SOLUTION ORAL at 11:49

## 2024-11-11 RX ADMIN — Medication 50 MILLIGRAM(S): at 05:25

## 2024-11-11 RX ADMIN — APIXABAN 5 MILLIGRAM(S): 5 TABLET, FILM COATED ORAL at 05:26

## 2024-11-11 RX ADMIN — APIXABAN 5 MILLIGRAM(S): 5 TABLET, FILM COATED ORAL at 17:21

## 2024-11-11 RX ADMIN — Medication 40 MILLIEQUIVALENT(S): at 17:21

## 2024-11-11 RX ADMIN — Medication 6 MILLIGRAM(S): at 22:07

## 2024-11-11 RX ADMIN — LOSARTAN POTASSIUM 25 MILLIGRAM(S): 25 TABLET ORAL at 11:49

## 2024-11-11 RX ADMIN — Medication 1 DROP(S): at 17:21

## 2024-11-11 RX ADMIN — Medication 125 MICROGRAM(S): at 05:26

## 2024-11-11 RX ADMIN — Medication 1 DROP(S): at 05:26

## 2024-11-11 RX ADMIN — Medication 300 MILLIGRAM(S): at 11:49

## 2024-11-11 RX ADMIN — Medication 50 MILLIGRAM(S): at 17:22

## 2024-11-11 RX ADMIN — Medication 81 MILLIGRAM(S): at 11:49

## 2024-11-11 RX ADMIN — Medication 1 APPLICATION(S): at 11:50

## 2024-11-11 NOTE — CONSULT NOTE ADULT - NS ATTEND AMEND GEN_ALL_CORE FT
68M morbidly obese w/ HTN, HLD, PVD p/w Afib, decompensated HF, and acute hypoxemic respiratory failure.  Improved with diuretics for HF and rate control.  Now improved and downgraded to medicine.   Pt w/ occ daytime somnolence (no sx while driving), +apneas + snoring never had PSG but likely has SHILOH. May be contributing to his AF as well.   Reports some chronic wheezing for past few years and as a child- may have underlying asthma. Never smoked, denies toxic exposures  elevated pasp likely secondary to severe volume overload and HF (group 2 ) but needs repeat echo when euvolemic to ensure improved    - cont diuresis as per primary team  -wean off o2 as tolerated  - can cont  CPAP qhs for SHILOH  -outpt echo when euvolemic to eval RV and pasp  - outpt PSG  -outpt PFT  - recc weight loss  - prn BD tx  - outpt Pulm clinic f/u

## 2024-11-11 NOTE — CONSULT NOTE ADULT - SUBJECTIVE AND OBJECTIVE BOX
CHIEF COMPLAINT: sob    HPI:  67 yo m pmhx obesity, HTN on metoprolol XL and amlodipine, HLD, PVD and gout admitted with new onset JOVAN, acute congestive heart failure, acute hypoxic respiratory failure, component of pulmonary edema, b/l LE edema with ulcerations of right leg.   RRT earlier during admission for JOVAN, HR in the 140-160s, increasing fio2 requirements.  Dilt gtt increased from 5 to 7.5 and ICU consulted. Pt admitted to ICU on Hiflo NC and agressivley diuresed with bumex drip. TTE with severe diastolic dysfx and RV dysfx. Pt's RVR improved once diuresed well and transitioned to PO rate control agents and diuretics. Pt eventually transitioned off Hiflo to 4LNC and downgraded to medicine service. Pulm consulted for f/u.     Subjective:     PAST MEDICAL & SURGICAL HISTORY:  Hypertension      Gout      Hypercholesteremia      Morbid obesity      H/O colonoscopy          FAMILY HISTORY:  Family history of pacemaker (Mother)    FH: diabetes mellitus (Father)        SOCIAL HISTORY:  Smoking: __ packs x ___ years  EtOH Use:  Marital Status:  Occupation:  Recent Travel:  Country of Birth:  Advance Directives:    Allergies    niacin (Other)    Intolerances        HOME MEDICATIONS:    REVIEW OF SYSTEMS:  Constitutional:   Eyes:  ENT:  CV:  Resp:  GI:  :  MSK:  Integumentary:  Neurological:  Psychiatric:  Endocrine:  Hematologic/Lymphatic:  Allergic/Immunologic:  [ ] All other systems negative  [ ] Unable to assess ROS because ________    OBJECTIVE:  ICU Vital Signs Last 24 Hrs  T(C): 36 (11 Nov 2024 08:00), Max: 36.8 (10 Nov 2024 23:43)  T(F): 96.8 (11 Nov 2024 08:00), Max: 98.3 (10 Nov 2024 23:43)  HR: 93 (11 Nov 2024 07:00) (79 - 126)  BP: 97/65 (11 Nov 2024 04:00) (92/68 - 123/81)  BP(mean): 76 (11 Nov 2024 04:00) (63 - 93)  ABP: --  ABP(mean): --  RR: 22 (11 Nov 2024 07:00) (13 - 30)  SpO2: 91% (11 Nov 2024 07:00) (88% - 98%)    O2 Parameters below as of 10 Nov 2024 16:42  Patient On (Oxygen Delivery Method): nasal cannula w/ humidification  O2 Flow (L/min): 5  O2 Concentration (%): 40          11-10 @ 07:01  -  11-11 @ 07:00  --------------------------------------------------------  IN: 1130 mL / OUT: 1425 mL / NET: -295 mL      CAPILLARY BLOOD GLUCOSE          PHYSICAL EXAM:  General:   HEENT:   Lymph Nodes:  Neck:   Respiratory:   Cardiovascular:   Abdomen:   Extremities:   Skin:   Neurological:      HOSPITAL MEDICATIONS:  MEDICATIONS  (STANDING):  allopurinol 300 milliGRAM(s) Oral daily  apixaban 5 milliGRAM(s) Oral every 12 hours  artificial  tears Solution 1 Drop(s) Left EYE every 12 hours  aspirin  chewable 81 milliGRAM(s) Oral daily  buMETAnide 4 milliGRAM(s) Oral daily  chlorhexidine 2% Cloths 1 Application(s) Topical <User Schedule>  collagenase Ointment 1 Application(s) Topical daily  digoxin     Tablet 125 MICROGram(s) Oral daily  influenza  Vaccine (HIGH DOSE) 0.5 milliLiter(s) IntraMuscular once  losartan 25 milliGRAM(s) Oral every 24 hours  metoprolol succinate  milliGRAM(s) Oral daily  polyethylene glycol 3350 17 Gram(s) Oral daily  rosuvastatin 5 milliGRAM(s) Oral at bedtime  senna 2 Tablet(s) Oral at bedtime  spironolactone 50 milliGRAM(s) Oral daily    MEDICATIONS  (PRN):  acetaminophen     Tablet .. 650 milliGRAM(s) Oral every 6 hours PRN Temp greater or equal to 38C (100.4F), Mild Pain (1 - 3)  benzocaine/menthol Lozenge 1 Lozenge Oral every 6 hours PRN Sore Throat  bisacodyl 5 milliGRAM(s) Oral every 12 hours PRN Constipation  melatonin 6 milliGRAM(s) Oral at bedtime PRN Insomnia  ondansetron Injectable 4 milliGRAM(s) IV Push every 8 hours PRN Nausea and/or Vomiting      LABS:                        14.9   10.58 )-----------( 262      ( 11 Nov 2024 03:10 )             48.5     11-11    137  |  99  |  36[H]  ----------------------------<  118[H]  3.6   |  33[H]  |  0.93    Ca    9.0      11 Nov 2024 03:10  Phos  3.8     11-11  Mg     1.9     11-11    TPro  6.9  /  Alb  2.8[L]  /  TBili  0.8  /  DBili  x   /  AST  47[H]  /  ALT  47  /  AlkPhos  64  11-11      Urinalysis Basic - ( 11 Nov 2024 03:10 )    Color: x / Appearance: x / SG: x / pH: x  Gluc: 118 mg/dL / Ketone: x  / Bili: x / Urobili: x   Blood: x / Protein: x / Nitrite: x   Leuk Esterase: x / RBC: x / WBC x   Sq Epi: x / Non Sq Epi: x / Bacteria: x            MICROBIOLOGY: reviewed     RADIOLOGY:  [x ] Reviewed and interpreted by me    EKG: reviewed  CHIEF COMPLAINT: sob    HPI:  67 yo m pmhx obesity, HTN on metoprolol XL and amlodipine, HLD, PVD and gout admitted with new onset JOVAN, acute congestive heart failure, acute hypoxic respiratory failure, component of pulmonary edema, b/l LE edema with ulcerations of right leg.   RRT earlier during admission for JOVAN, HR in the 140-160s, increasing fio2 requirements.  Dilt gtt increased from 5 to 7.5 and ICU consulted. Pt admitted to ICU on Hiflo NC and agressivley diuresed with bumex drip. TTE with severe diastolic dysfx and RV dysfx. Pt's RVR improved once diuresed well and transitioned to PO rate control agents and diuretics. Pt eventually transitioned off Hiflo to 4LNC and downgraded to medicine service. Pulm consulted for f/u.     Subjective: Pt seen at bedside. Pt satting 93% on 4LNC sitting in chair. Pt endorses much improvement in his SOB. Endorses remote hx of "wheezing" but denies any asthma, COPD, hx CHF, and has never had PFTs nor sleep study.     PAST MEDICAL & SURGICAL HISTORY:  Hypertension      Gout      Hypercholesteremia      Morbid obesity      H/O colonoscopy          FAMILY HISTORY:  Family history of pacemaker (Mother)    FH: diabetes mellitus (Father)        SOCIAL HISTORY:  Smoking: denies   EtOH Use: denies   Recent Travel: denies       Allergies    niacin (Other)    Intolerances        HOME MEDICATIONS:    REVIEW OF SYSTEMS:    [x ] All other systems negative  [ ] Unable to assess ROS because ________    OBJECTIVE:  ICU Vital Signs Last 24 Hrs  T(C): 36 (11 Nov 2024 08:00), Max: 36.8 (10 Nov 2024 23:43)  T(F): 96.8 (11 Nov 2024 08:00), Max: 98.3 (10 Nov 2024 23:43)  HR: 93 (11 Nov 2024 07:00) (79 - 126)  BP: 97/65 (11 Nov 2024 04:00) (92/68 - 123/81)  BP(mean): 76 (11 Nov 2024 04:00) (63 - 93)  ABP: --  ABP(mean): --  RR: 22 (11 Nov 2024 07:00) (13 - 30)  SpO2: 91% (11 Nov 2024 07:00) (88% - 98%)    O2 Parameters below as of 10 Nov 2024 16:42  Patient On (Oxygen Delivery Method): nasal cannula w/ humidification  O2 Flow (L/min): 5  O2 Concentration (%): 40          11-10 @ 07:01  -  11-11 @ 07:00  --------------------------------------------------------  IN: 1130 mL / OUT: 1425 mL / NET: -295 mL      CAPILLARY BLOOD GLUCOSE          PHYSICAL EXAM:  GENERAL: obese, NAD, sitting in chair comfortably  HEAD:  Atraumatic, normocephalic  EYES: EOMI, PERRL  HEART: irregular rate and rhythm  LUNGS: Unlabored respirations. diminished BS bialt  ABDOMEN: Soft, nontender, nondistended  EXTREMITIES: warm, chronic LE edema   NERVOUS SYSTEM:  A&Ox3, moving all extremities, no focal deficits         HOSPITAL MEDICATIONS:  MEDICATIONS  (STANDING):  allopurinol 300 milliGRAM(s) Oral daily  apixaban 5 milliGRAM(s) Oral every 12 hours  artificial  tears Solution 1 Drop(s) Left EYE every 12 hours  aspirin  chewable 81 milliGRAM(s) Oral daily  buMETAnide 4 milliGRAM(s) Oral daily  chlorhexidine 2% Cloths 1 Application(s) Topical <User Schedule>  collagenase Ointment 1 Application(s) Topical daily  digoxin     Tablet 125 MICROGram(s) Oral daily  influenza  Vaccine (HIGH DOSE) 0.5 milliLiter(s) IntraMuscular once  losartan 25 milliGRAM(s) Oral every 24 hours  metoprolol succinate  milliGRAM(s) Oral daily  polyethylene glycol 3350 17 Gram(s) Oral daily  rosuvastatin 5 milliGRAM(s) Oral at bedtime  senna 2 Tablet(s) Oral at bedtime  spironolactone 50 milliGRAM(s) Oral daily    MEDICATIONS  (PRN):  acetaminophen     Tablet .. 650 milliGRAM(s) Oral every 6 hours PRN Temp greater or equal to 38C (100.4F), Mild Pain (1 - 3)  benzocaine/menthol Lozenge 1 Lozenge Oral every 6 hours PRN Sore Throat  bisacodyl 5 milliGRAM(s) Oral every 12 hours PRN Constipation  melatonin 6 milliGRAM(s) Oral at bedtime PRN Insomnia  ondansetron Injectable 4 milliGRAM(s) IV Push every 8 hours PRN Nausea and/or Vomiting      LABS:                        14.9   10.58 )-----------( 262      ( 11 Nov 2024 03:10 )             48.5     11-11    137  |  99  |  36[H]  ----------------------------<  118[H]  3.6   |  33[H]  |  0.93    Ca    9.0      11 Nov 2024 03:10  Phos  3.8     11-11  Mg     1.9     11-11    TPro  6.9  /  Alb  2.8[L]  /  TBili  0.8  /  DBili  x   /  AST  47[H]  /  ALT  47  /  AlkPhos  64  11-11      Urinalysis Basic - ( 11 Nov 2024 03:10 )    Color: x / Appearance: x / SG: x / pH: x  Gluc: 118 mg/dL / Ketone: x  / Bili: x / Urobili: x   Blood: x / Protein: x / Nitrite: x   Leuk Esterase: x / RBC: x / WBC x   Sq Epi: x / Non Sq Epi: x / Bacteria: x            MICROBIOLOGY: reviewed     RADIOLOGY:  [x ] Reviewed and interpreted by me    EKG: reviewed

## 2024-11-11 NOTE — OCCUPATIONAL THERAPY INITIAL EVALUATION ADULT - ADDITIONAL COMMENTS
Pt lives with spouse in a private home with 4 steps to enter with L handrail. Once inside, the pt main bedroom and bathroom is on that floor when entering. Pt was independent with functional transfers and mobility with no AD.

## 2024-11-11 NOTE — CONSULT NOTE ADULT - TIME BILLING
- Review of chart and consultation notes  - Exam and discussion with patient  - Review of laboratory and imaging   - Establishing a care plan for patient  - Communication with other providers
review of laboratory data, radiology results, discussion with primary team\patient, and monitoring for potential decompensation. Interventions were performed as documented above

## 2024-11-11 NOTE — PROGRESS NOTE ADULT - SUBJECTIVE AND OBJECTIVE BOX
Patient is a 68y old  Male who presents with New onset Afib & CHF (11 Nov 2024 07:35)    PAST MEDICAL & SURGICAL HISTORY:  Hypertension    Gout    Hypercholesteremia    Morbid obesity    PVD    INTERVAL HISTORY:  	  MEDICATIONS:  MEDICATIONS  (STANDING):  allopurinol 300 milliGRAM(s) Oral daily  apixaban 5 milliGRAM(s) Oral every 12 hours  artificial  tears Solution 1 Drop(s) Left EYE every 12 hours  aspirin  chewable 81 milliGRAM(s) Oral daily  buMETAnide 4 milliGRAM(s) Oral daily  chlorhexidine 2% Cloths 1 Application(s) Topical <User Schedule>  collagenase Ointment 1 Application(s) Topical daily  digoxin     Tablet 125 MICROGram(s) Oral daily  influenza  Vaccine (HIGH DOSE) 0.5 milliLiter(s) IntraMuscular once  losartan 25 milliGRAM(s) Oral every 24 hours  metoprolol succinate  milliGRAM(s) Oral daily  polyethylene glycol 3350 17 Gram(s) Oral daily  potassium chloride    Tablet ER 40 milliEquivalent(s) Oral once  rosuvastatin 5 milliGRAM(s) Oral at bedtime  senna 2 Tablet(s) Oral at bedtime  spironolactone 50 milliGRAM(s) Oral daily    MEDICATIONS  (PRN):  acetaminophen     Tablet .. 650 milliGRAM(s) Oral every 6 hours PRN Temp greater or equal to 38C (100.4F), Mild Pain (1 - 3)  benzocaine/menthol Lozenge 1 Lozenge Oral every 6 hours PRN Sore Throat  bisacodyl 5 milliGRAM(s) Oral every 12 hours PRN Constipation  melatonin 6 milliGRAM(s) Oral at bedtime PRN Insomnia  ondansetron Injectable 4 milliGRAM(s) IV Push every 8 hours PRN Nausea and/or Vomiting    Vitals:  T(F): 97.6 (11-11-24 @ 15:24), Max: 98.3 (11-10-24 @ 23:43)  HR: 116 (11-11-24 @ 12:00) (79 - 126)  BP: 121/97 (11-11-24 @ 12:00) (95/49 - 121/97)  RR: 22 (11-11-24 @ 12:00) (18 - 30)  SpO2: 93% (11-11-24 @ 12:00) (90% - 96%)  Wt(kg): --146.5kg    11-10 @ 07:01  -  11-11 @ 07:00  --------------------------------------------------------  IN:    Oral Fluid: 1130 mL  Total IN: 1130 mL    OUT:    Indwelling Catheter - Urethral (mL): 650 mL    Voided (mL): 775 mL  Total OUT: 1425 mL    Total NET: -295 mL    11-11 @ 07:01  -  11-11 @ 16:07  --------------------------------------------------------  IN:    Oral Fluid: 521 mL  Total IN: 521 mL    OUT:    Voided (mL): 1100 mL  Total OUT: 1100 mL    Total NET: -579 mL    PHYSICAL EXAM:  Neuro: Awake, responsive  CV: S1 S2 irreg irregular   Lungs: diminished to bases  GI: Soft, BS +, ND, NT  Extremities: No edema    TELEMETRY: afib    RADIOLOGY: < from: Xray Chest 1 View- PORTABLE-Routine (Xray Chest 1 View- PORTABLE-Routine in AM.) (11.08.24 @ 07:15) >  Frontal expiratory view of the chest shows the heart to be similar in   size. The lungs show partial clearing of the left lung with similar small   right effusion and there is no evidence of pneumothorax nor left pleural   effusion.    IMPRESSION:  Left lung clearing.    < end of copied text >    DIAGNOSTIC TESTING:    [x ] Echocardiogram: < from: TTE Echo Complete w/ Contrast w/ Doppler (11.01.24 @ 10:28) >  CONCLUSIONS:     1. Left ventricular systolic function is mildly to moderately decreased   with an ejection fraction visually estimated at 45 to 50 %.   2. There is severe (grade 3) left ventricular diastolic dysfunction.   3. Enlarged right ventricular cavity size and reduced right ventricular   systolic function.   4. Left atrium is mildly dilated.   5. Thickened mitral valve leaflets.   6. Mild mitral valve leaflet calcification.   7. There is mild calcification of the mitral valve annulus.   8. Trace mitral regurgitation.   9. Moderate tricuspid regurgitation.  10. Fibrocalcific aortic valve sclerosis without stenosis.  11. No pericardial effusion seen.    < end of copied text >    LABS:	 	    11 Nov 2024 03:10    137    |  99     |  36     ----------------------------<  118    3.6     |  33     |  0.93   10 Nov 2024 03:50    138    |  98     |  33     ----------------------------<  111    3.7     |  34     |  0.88   09 Nov 2024 13:45    138    |  100    |  34     ----------------------------<  126    4.0     |  33     |  1.06     Ca    9.0        11 Nov 2024 03:10  Phos  3.8       11 Nov 2024 03:10  Mg     1.9       11 Nov 2024 03:10    TPro  6.9    /  Alb  2.8    /  TBili  0.8    /  DBili  x      /  AST  47     /  ALT  47     /  AlkPhos  64     11 Nov 2024 03:10                        14.9   10.58 )-----------( 262      ( 11 Nov 2024 03:10 )             48.5 ,                       14.8   10.04 )-----------( 275      ( 10 Nov 2024 03:50 )             47.9 ,                       14.4   9.72  )-----------( 253      ( 09 Nov 2024 03:50 )             46.6   pro-BNP: Pro-Brain Natriuretic Peptide: 1378 pg/mL (11.11.24 @ 03:10)                   Patient is a 68y old  Male who presents with New onset Afib & CHF (11 Nov 2024 07:35)    PAST MEDICAL & SURGICAL HISTORY:  Hypertension    Gout    Hypercholesteremia    Morbid obesity    PVD    INTERVAL HISTORY: in no acute distress, denies any chest pain, palpitation or sob   	  MEDICATIONS:  MEDICATIONS  (STANDING):  allopurinol 300 milliGRAM(s) Oral daily  apixaban 5 milliGRAM(s) Oral every 12 hours  artificial  tears Solution 1 Drop(s) Left EYE every 12 hours  aspirin  chewable 81 milliGRAM(s) Oral daily  buMETAnide 4 milliGRAM(s) Oral daily  chlorhexidine 2% Cloths 1 Application(s) Topical <User Schedule>  collagenase Ointment 1 Application(s) Topical daily  digoxin     Tablet 125 MICROGram(s) Oral daily  influenza  Vaccine (HIGH DOSE) 0.5 milliLiter(s) IntraMuscular once  losartan 25 milliGRAM(s) Oral every 24 hours  metoprolol succinate  milliGRAM(s) Oral daily  polyethylene glycol 3350 17 Gram(s) Oral daily  potassium chloride    Tablet ER 40 milliEquivalent(s) Oral once  rosuvastatin 5 milliGRAM(s) Oral at bedtime  senna 2 Tablet(s) Oral at bedtime  spironolactone 50 milliGRAM(s) Oral daily    MEDICATIONS  (PRN):  acetaminophen     Tablet .. 650 milliGRAM(s) Oral every 6 hours PRN Temp greater or equal to 38C (100.4F), Mild Pain (1 - 3)  benzocaine/menthol Lozenge 1 Lozenge Oral every 6 hours PRN Sore Throat  bisacodyl 5 milliGRAM(s) Oral every 12 hours PRN Constipation  melatonin 6 milliGRAM(s) Oral at bedtime PRN Insomnia  ondansetron Injectable 4 milliGRAM(s) IV Push every 8 hours PRN Nausea and/or Vomiting    Vitals:  T(F): 97.6 (11-11-24 @ 15:24), Max: 98.3 (11-10-24 @ 23:43)  HR: 116 (11-11-24 @ 12:00) (79 - 126)  BP: 121/97 (11-11-24 @ 12:00) (95/49 - 121/97)  RR: 22 (11-11-24 @ 12:00) (18 - 30)  SpO2: 93% (11-11-24 @ 12:00) (90% - 96%)  Wt(kg): --146.5kg    11-10 @ 07:01  -  11-11 @ 07:00  --------------------------------------------------------  IN:    Oral Fluid: 1130 mL  Total IN: 1130 mL    OUT:    Indwelling Catheter - Urethral (mL): 650 mL    Voided (mL): 775 mL  Total OUT: 1425 mL    Total NET: -295 mL    11-11 @ 07:01  -  11-11 @ 16:07  --------------------------------------------------------  IN:    Oral Fluid: 521 mL  Total IN: 521 mL    OUT:    Voided (mL): 1100 mL  Total OUT: 1100 mL    Total NET: -579 mL    PHYSICAL EXAM:  Neuro: Awake, responsive  CV: S1 S2 irreg irregular   Lungs: diminished to bases  GI: Soft, BS +, ND, NT  Extremities: Trace LE edema    TELEMETRY: afib    RADIOLOGY: < from: Xray Chest 1 View- PORTABLE-Routine (Xray Chest 1 View- PORTABLE-Routine in AM.) (11.08.24 @ 07:15) >  Frontal expiratory view of the chest shows the heart to be similar in   size. The lungs show partial clearing of the left lung with similar small   right effusion and there is no evidence of pneumothorax nor left pleural   effusion.    IMPRESSION:  Left lung clearing.    < end of copied text >    DIAGNOSTIC TESTING:    [x ] Echocardiogram: < from: TTE Echo Complete w/ Contrast w/ Doppler (11.01.24 @ 10:28) >  CONCLUSIONS:     1. Left ventricular systolic function is mildly to moderately decreased   with an ejection fraction visually estimated at 45 to 50 %.   2. There is severe (grade 3) left ventricular diastolic dysfunction.   3. Enlarged right ventricular cavity size and reduced right ventricular   systolic function.   4. Left atrium is mildly dilated.   5. Thickened mitral valve leaflets.   6. Mild mitral valve leaflet calcification.   7. There is mild calcification of the mitral valve annulus.   8. Trace mitral regurgitation.   9. Moderate tricuspid regurgitation.  10. Fibrocalcific aortic valve sclerosis without stenosis.  11. No pericardial effusion seen.    < end of copied text >    LABS:	 	    11 Nov 2024 03:10    137    |  99     |  36     ----------------------------<  118    3.6     |  33     |  0.93   10 Nov 2024 03:50    138    |  98     |  33     ----------------------------<  111    3.7     |  34     |  0.88   09 Nov 2024 13:45    138    |  100    |  34     ----------------------------<  126    4.0     |  33     |  1.06     Ca    9.0        11 Nov 2024 03:10  Phos  3.8       11 Nov 2024 03:10  Mg     1.9       11 Nov 2024 03:10    TPro  6.9    /  Alb  2.8    /  TBili  0.8    /  DBili  x      /  AST  47     /  ALT  47     /  AlkPhos  64     11 Nov 2024 03:10                        14.9   10.58 )-----------( 262      ( 11 Nov 2024 03:10 )             48.5 ,                       14.8   10.04 )-----------( 275      ( 10 Nov 2024 03:50 )             47.9 ,                       14.4   9.72  )-----------( 253      ( 09 Nov 2024 03:50 )             46.6   pro-BNP: Pro-Brain Natriuretic Peptide: 1378 pg/mL (11.11.24 @ 03:10)

## 2024-11-11 NOTE — PROGRESS NOTE ADULT - ASSESSMENT
67 y/o male with a pmhx of Hypertension, Gout, Hypercholesteremia, and Morbid obesity admitted with Decompensated HF with fluid overload, and AF with RVR.    TTE with EF 45-50%,  grade III DD,  Enlarged right ventricular cavity size and reduced right ventricular systolic function. mod TR  off lasix drip and cardizem drip, HR 80-110s  BNP 5400 ->3200->1600 ->1300    -currently on Bumex 4 mg  po daily, aldactone 50 mg daily  -losartan 25 daily   -Strict I&O, daily weights, diuresing well with significant weight loss  -Replete Mg to 2, and K+ to 4, monitor renal function closely, creat .96 today  -s/p Diamox dosing periodically for elevated bicarb   -will decrease Toprol  to 50 po bid to allow better BP, and increase dig to .25mg po daily, Dig level .71 11/8  -cont on Eliquis 5mg bid for AC  -Currently on 4L O2 via [place], pt states the he has been told his SPO2 runs low, titrate down FiO2 as tolerated  -increase activity as tolerated, PT. Incentive spirometry   -outpatient follow up with EP cardiology Dr. Leonie Quinn, outpatient sleep study

## 2024-11-11 NOTE — OCCUPATIONAL THERAPY INITIAL EVALUATION ADULT - GENERAL OBSERVATIONS, REHAB EVAL
Pt was encountered supine in bed; NAD, O2 supplementation via nasal cannula, cardiac monitor +, continuous pulse ox +, BP cuff +, condom catheter +, BLE pneumatic pumps, AXOX4, followed commands, cooperative; pt had no c/o pain. PT Jennifer, PTS Yu and RN Leila present.

## 2024-11-11 NOTE — PROGRESS NOTE ADULT - SUBJECTIVE AND OBJECTIVE BOX
Medicine Progress Note    Patient is a 68y old  Male who presents with a chief complaint of New onset Afib & CHF (11 Nov 2024 16:07)      SUBJECTIVE / OVERNIGHT EVENTS: no events over night    Pt seen at bedside. Pt satting 93% on 4LNC sitting in chair. Pt endorses much improvement in his SOB. Endorses remote hx of "wheezing" but denies any asthma, COPD, hx CHF, and has never had PFTs nor sleep study.         ADDITIONAL REVIEW OF SYSTEMS: negative, c/o thirst     MEDICATIONS  (STANDING):  allopurinol 300 milliGRAM(s) Oral daily  apixaban 5 milliGRAM(s) Oral every 12 hours  artificial  tears Solution 1 Drop(s) Left EYE every 12 hours  aspirin  chewable 81 milliGRAM(s) Oral daily  buMETAnide 4 milliGRAM(s) Oral daily  chlorhexidine 2% Cloths 1 Application(s) Topical <User Schedule>  collagenase Ointment 1 Application(s) Topical daily  digoxin     Tablet 125 MICROGram(s) Oral daily  influenza  Vaccine (HIGH DOSE) 0.5 milliLiter(s) IntraMuscular once  losartan 25 milliGRAM(s) Oral every 24 hours  metoprolol succinate  milliGRAM(s) Oral daily  polyethylene glycol 3350 17 Gram(s) Oral daily  potassium chloride    Tablet ER 40 milliEquivalent(s) Oral once  rosuvastatin 5 milliGRAM(s) Oral at bedtime  senna 2 Tablet(s) Oral at bedtime  spironolactone 50 milliGRAM(s) Oral daily    MEDICATIONS  (PRN):  acetaminophen     Tablet .. 650 milliGRAM(s) Oral every 6 hours PRN Temp greater or equal to 38C (100.4F), Mild Pain (1 - 3)  benzocaine/menthol Lozenge 1 Lozenge Oral every 6 hours PRN Sore Throat  bisacodyl 5 milliGRAM(s) Oral every 12 hours PRN Constipation  melatonin 6 milliGRAM(s) Oral at bedtime PRN Insomnia  ondansetron Injectable 4 milliGRAM(s) IV Push every 8 hours PRN Nausea and/or Vomiting    CAPILLARY BLOOD GLUCOSE        I&O's Summary    10 Nov 2024 07:01  -  11 Nov 2024 07:00  --------------------------------------------------------  IN: 1130 mL / OUT: 1425 mL / NET: -295 mL    11 Nov 2024 07:01  -  11 Nov 2024 16:12  --------------------------------------------------------  IN: 521 mL / OUT: 1100 mL / NET: -579 mL        PHYSICAL EXAM:  Vital Signs Last 24 Hrs  T(C): 36.4 (11 Nov 2024 15:24), Max: 36.8 (10 Nov 2024 23:43)  T(F): 97.6 (11 Nov 2024 15:24), Max: 98.3 (10 Nov 2024 23:43)  HR: 116 (11 Nov 2024 12:00) (79 - 126)  BP: 121/97 (11 Nov 2024 12:00) (95/49 - 121/97)  BP(mean): 106 (11 Nov 2024 12:00) (63 - 106)  RR: 22 (11 Nov 2024 12:00) (18 - 30)  SpO2: 93% (11 Nov 2024 12:00) (90% - 96%)    Parameters below as of 10 Nov 2024 16:42  Patient On (Oxygen Delivery Method): nasal cannula w/ humidification  O2 Flow (L/min): 5  O2 Concentration (%): 40        CONSTITUTIONAL: NAD, obese   ENMT: Moist oral mucosa, no pharyngeal injection or exudates;   RESPIRATORY: Normal respiratory effort; lungs are dim  to auscultation bilaterally  CARDIOVASCULAR: irregularly irregular rate and rhythm, trace  lower extremity edema; RT leg wounds   ABDOMEN: Nontender to palpation, normoactive bowel sounds, no rebound/guarding;   PSYCH: A+O to person, place, and time; affect appropriate  NEUROLOGY: CN 2-12 are intact and symmetric; no gross sensory deficits   SKIN: No rashes; as above     LABS:                        14.9   10.58 )-----------( 262      ( 11 Nov 2024 03:10 )             48.5     11-11    137  |  99  |  36[H]  ----------------------------<  118[H]  3.6   |  33[H]  |  0.93    Ca    9.0      11 Nov 2024 03:10  Phos  3.8     11-11  Mg     1.9     11-11    TPro  6.9  /  Alb  2.8[L]  /  TBili  0.8  /  DBili  x   /  AST  47[H]  /  ALT  47  /  AlkPhos  64  11-11          Urinalysis Basic - ( 11 Nov 2024 03:10 )    Color: x / Appearance: x / SG: x / pH: x  Gluc: 118 mg/dL / Ketone: x  / Bili: x / Urobili: x   Blood: x / Protein: x / Nitrite: x   Leuk Esterase: x / RBC: x / WBC x   Sq Epi: x / Non Sq Epi: x / Bacteria: x            RADIOLOGY & ADDITIONAL TESTS:  Imaging from Last 24 Hours:    Electrocardiogram/QTc Interval:    COORDINATION OF CARE:  Care Discussed with Consultants/Other Providers:

## 2024-11-11 NOTE — PROGRESS NOTE ADULT - ASSESSMENT
67 yo m pmhx obesity, HTN on metoprolol XL and amlodipine, HLD, PVD and gout admitted with new onset JOVAN, acute congestive heart failure, acute hypoxic respiratory failure, component of pulmonary edema, b/l LE edema with ulcerations of right leg.   RRT earlier during admission for JOVAN, HR in the 140-160s, increasing fio2 requirements.  Dilt gtt increased from 5 to 7.5 and ICU consulted. Pt admitted to ICU on Hiflo NC and agressivley diuresed with bumex drip. TTE with severe diastolic dysfx and RV dysfx. Pt's RVR improved once diuresed well and transitioned to PO rate control agents and diuretics. Pt eventually transitioned off Hiflo to 4LNC and downgraded to medicine service. Pulm consulted for f/u.

## 2024-11-11 NOTE — CONSULT NOTE ADULT - ASSESSMENT
68M morbidly obese w/ HTN, HLD, PVD. Admitted w/ new onset Rapid Afib, decompensated HF, and acute hypoxemic respiratory failure. Admitted to icu for resp failure and afib with RVR which improved with diuretics. Now improved and downgraded to medicine. Pulm consulted for follow up.    recs:  - admitted to ICU for HRF on Hiflo NC bumex and dilt drip. Both FiO2 requirements and RVR improved with extensive diuresis. Now ~30L negative over course of ICU stay.   -TTE with grade 3 diastolic dysfx and RV dysfx.   - cont PO diuretics and rate control per cards   - cont to titrate down FiO2 as able   - bipap qhs for SHILOH  - Pt will need outpt sleep study and pulm follow up. Please call [phone] or email  [email or web address] for an appointment at the Pulmonary office ([address] [place] [address], [address], [place], [place]).     note incomplete 68M morbidly obese w/ HTN, HLD, PVD. Admitted w/ new onset Rapid Afib, decompensated HF, and acute hypoxemic respiratory failure. Admitted to icu for resp failure and afib with RVR which improved with diuretics. Now improved and downgraded to medicine. Pulm consulted for follow up.    recs:  - admitted to ICU for HRF on Hiflo NC bumex and dilt drip. Both FiO2 requirements and RVR improved with extensive diuresis. Now ~30L negative over course of ICU stay.   -TTE with grade 3 diastolic dysfx and RV dysfx.   - cont PO diuretics and rate control per cards   - cont to titrate down FiO2 as able   - CPAP qhs for SHILOH  -satting well on 4LNC. cont to titrate as able  - Pt will need outpt sleep study, PFTs, and pulm follow up. Please call [phone] or email  [email or web address] for an appointment at the Pulmonary office ([address], [address], [place], [place]).     d/w dr young

## 2024-11-11 NOTE — OCCUPATIONAL THERAPY INITIAL EVALUATION ADULT - PERTINENT HX OF CURRENT PROBLEM, REHAB EVAL
As per H&P; 69 yo m pmhx obesity, HTN on metoprolol XL and amlodipine, HLD, PVD and gout admitted with new onset JOVAN, acute congestive heart failure, acute hypoxic respiratory failure, component of pulmonary edema, b/l LE edema with ulcerations of right leg.   RRT earlier during admission for JOVAN, HR in the 140-160s, increasing fio2 requirements.  Dilt gtt increased from 5 to 7.5 and ICU consulted. Pt admitted to ICU on Hiflo NC and agressivley diuresed with bumex drip. TTE with severe diastolic dysfx and RV dysfx. Pt's RVR improved once diuresed well and transitioned to PO rate control agents and diuretics. Pt eventually transitioned off Hiflo to 4LNC and downgraded to medicine service. Pulm consulted for f/u.

## 2024-11-12 LAB
ALBUMIN SERPL ELPH-MCNC: 2.9 G/DL — LOW (ref 3.3–5)
ALP SERPL-CCNC: 63 U/L — SIGNIFICANT CHANGE UP (ref 40–120)
ALT FLD-CCNC: 47 U/L — SIGNIFICANT CHANGE UP (ref 12–78)
ANION GAP SERPL CALC-SCNC: 5 MMOL/L — SIGNIFICANT CHANGE UP (ref 5–17)
AST SERPL-CCNC: 47 U/L — HIGH (ref 15–37)
BASOPHILS # BLD AUTO: 0.1 K/UL — SIGNIFICANT CHANGE UP (ref 0–0.2)
BASOPHILS NFR BLD AUTO: 0.9 % — SIGNIFICANT CHANGE UP (ref 0–2)
BILIRUB SERPL-MCNC: 0.9 MG/DL — SIGNIFICANT CHANGE UP (ref 0.2–1.2)
BUN SERPL-MCNC: 38 MG/DL — HIGH (ref 7–23)
CALCIUM SERPL-MCNC: 9 MG/DL — SIGNIFICANT CHANGE UP (ref 8.5–10.1)
CHLORIDE SERPL-SCNC: 101 MMOL/L — SIGNIFICANT CHANGE UP (ref 96–108)
CO2 SERPL-SCNC: 31 MMOL/L — SIGNIFICANT CHANGE UP (ref 22–31)
CREAT SERPL-MCNC: 0.9 MG/DL — SIGNIFICANT CHANGE UP (ref 0.5–1.3)
EGFR: 92 ML/MIN/1.73M2 — SIGNIFICANT CHANGE UP
EOSINOPHIL # BLD AUTO: 0.4 K/UL — SIGNIFICANT CHANGE UP (ref 0–0.5)
EOSINOPHIL NFR BLD AUTO: 3.6 % — SIGNIFICANT CHANGE UP (ref 0–6)
GLUCOSE SERPL-MCNC: 116 MG/DL — HIGH (ref 70–99)
HCT VFR BLD CALC: 49.6 % — SIGNIFICANT CHANGE UP (ref 39–50)
HGB BLD-MCNC: 15.2 G/DL — SIGNIFICANT CHANGE UP (ref 13–17)
IMM GRANULOCYTES NFR BLD AUTO: 1.2 % — HIGH (ref 0–0.9)
LYMPHOCYTES # BLD AUTO: 19.3 % — SIGNIFICANT CHANGE UP (ref 13–44)
LYMPHOCYTES # BLD AUTO: 2.16 K/UL — SIGNIFICANT CHANGE UP (ref 1–3.3)
MAGNESIUM SERPL-MCNC: 2 MG/DL — SIGNIFICANT CHANGE UP (ref 1.6–2.6)
MCHC RBC-ENTMCNC: 19.2 PG — LOW (ref 27–34)
MCHC RBC-ENTMCNC: 30.6 G/DL — LOW (ref 32–36)
MCV RBC AUTO: 62.7 FL — LOW (ref 80–100)
MONOCYTES # BLD AUTO: 1.22 K/UL — HIGH (ref 0–0.9)
MONOCYTES NFR BLD AUTO: 10.9 % — SIGNIFICANT CHANGE UP (ref 2–14)
NEUTROPHILS # BLD AUTO: 7.2 K/UL — SIGNIFICANT CHANGE UP (ref 1.8–7.4)
NEUTROPHILS NFR BLD AUTO: 64.1 % — SIGNIFICANT CHANGE UP (ref 43–77)
NRBC # BLD: 0 /100 WBCS — SIGNIFICANT CHANGE UP (ref 0–0)
PHOSPHATE SERPL-MCNC: 4.1 MG/DL — SIGNIFICANT CHANGE UP (ref 2.5–4.5)
PLATELET # BLD AUTO: 287 K/UL — SIGNIFICANT CHANGE UP (ref 150–400)
POTASSIUM SERPL-MCNC: 3.9 MMOL/L — SIGNIFICANT CHANGE UP (ref 3.5–5.3)
POTASSIUM SERPL-SCNC: 3.9 MMOL/L — SIGNIFICANT CHANGE UP (ref 3.5–5.3)
PROT SERPL-MCNC: 7.2 GM/DL — SIGNIFICANT CHANGE UP (ref 6–8.3)
RBC # BLD: 7.91 M/UL — HIGH (ref 4.2–5.8)
RBC # FLD: 18.5 % — HIGH (ref 10.3–14.5)
SODIUM SERPL-SCNC: 137 MMOL/L — SIGNIFICANT CHANGE UP (ref 135–145)
WBC # BLD: 11.22 K/UL — HIGH (ref 3.8–10.5)
WBC # FLD AUTO: 11.22 K/UL — HIGH (ref 3.8–10.5)

## 2024-11-12 PROCEDURE — 99233 SBSQ HOSP IP/OBS HIGH 50: CPT

## 2024-11-12 PROCEDURE — 99232 SBSQ HOSP IP/OBS MODERATE 35: CPT

## 2024-11-12 RX ADMIN — Medication 50 MILLIGRAM(S): at 06:56

## 2024-11-12 RX ADMIN — Medication 6 MILLIGRAM(S): at 22:17

## 2024-11-12 RX ADMIN — Medication 1 DROP(S): at 05:43

## 2024-11-12 RX ADMIN — Medication 1 APPLICATION(S): at 13:29

## 2024-11-12 RX ADMIN — Medication 50 MILLIGRAM(S): at 05:42

## 2024-11-12 RX ADMIN — LOSARTAN POTASSIUM 25 MILLIGRAM(S): 25 TABLET ORAL at 13:29

## 2024-11-12 RX ADMIN — Medication 5 MILLIGRAM(S): at 22:17

## 2024-11-12 RX ADMIN — Medication 81 MILLIGRAM(S): at 13:29

## 2024-11-12 RX ADMIN — APIXABAN 5 MILLIGRAM(S): 5 TABLET, FILM COATED ORAL at 05:41

## 2024-11-12 RX ADMIN — APIXABAN 5 MILLIGRAM(S): 5 TABLET, FILM COATED ORAL at 17:32

## 2024-11-12 RX ADMIN — Medication 4 MILLIGRAM(S): at 05:42

## 2024-11-12 RX ADMIN — Medication 1 DROP(S): at 17:33

## 2024-11-12 RX ADMIN — Medication 250 MICROGRAM(S): at 05:41

## 2024-11-12 RX ADMIN — Medication 50 MILLIGRAM(S): at 17:32

## 2024-11-12 RX ADMIN — CHLORHEXIDINE GLUCONATE 1 APPLICATION(S): 40 SOLUTION TOPICAL at 05:42

## 2024-11-12 RX ADMIN — Medication 300 MILLIGRAM(S): at 13:28

## 2024-11-12 NOTE — PROGRESS NOTE ADULT - NS ATTEND AMEND GEN_ALL_CORE FT
68M morbidly obese w/ HTN, HLD, PVD p/w Afib, decompensated HF, and acute hypoxemic respiratory failure.  Improved with diuretics for HF and rate control.  Now improved and downgraded to medicine.   Pt w/ occ daytime somnolence (no sx while driving), +apneas + snoring never had PSG but likely has SHILOH. May be contributing to his AF as well.   Reports some chronic wheezing for past few years and as a child- may have underlying asthma. Never smoked, denies toxic exposures  elevated pasp likely secondary to severe volume overload and HF (group 2 ) but needs repeat echo when euvolemic to ensure improved    Feels improving slowly - denies complaints today    - cont diuresis as per primary team  -wean off o2 as tolerated  - can cont  CPAP qhs for SHILOH  - pt and oob to chair  -outpt echo when euvolemic to eval RV and pasp  - outpt PSG  -outpt PFT  - recc weight loss  - prn BD tx  - outpt Pulm clinic f/u

## 2024-11-12 NOTE — PROGRESS NOTE ADULT - SUBJECTIVE AND OBJECTIVE BOX
Patient is a 68y old  Male who presents with New onset Afib & CHF (12 Nov 2024 08:24)    PAST MEDICAL & SURGICAL HISTORY:  Hypertension    Gout    Hypercholesteremia    Morbid obesity    PVD    INTERVAL HISTORY:  	  MEDICATIONS:  MEDICATIONS  (STANDING):  allopurinol 300 milliGRAM(s) Oral daily  apixaban 5 milliGRAM(s) Oral every 12 hours  artificial  tears Solution 1 Drop(s) Left EYE every 12 hours  aspirin  chewable 81 milliGRAM(s) Oral daily  buMETAnide 4 milliGRAM(s) Oral daily  chlorhexidine 2% Cloths 1 Application(s) Topical <User Schedule>  collagenase Ointment 1 Application(s) Topical daily  digoxin     Tablet 250 MICROGram(s) Oral daily  losartan 25 milliGRAM(s) Oral every 24 hours  metoprolol succinate ER 50 milliGRAM(s) Oral two times a day  polyethylene glycol 3350 17 Gram(s) Oral daily  rosuvastatin 5 milliGRAM(s) Oral at bedtime  senna 2 Tablet(s) Oral at bedtime  spironolactone 50 milliGRAM(s) Oral daily    MEDICATIONS  (PRN):  acetaminophen     Tablet .. 650 milliGRAM(s) Oral every 6 hours PRN Temp greater or equal to 38C (100.4F), Mild Pain (1 - 3)  benzocaine/menthol Lozenge 1 Lozenge Oral every 6 hours PRN Sore Throat  bisacodyl 5 milliGRAM(s) Oral every 12 hours PRN Constipation  melatonin 6 milliGRAM(s) Oral at bedtime PRN Insomnia  ondansetron Injectable 4 milliGRAM(s) IV Push every 8 hours PRN Nausea and/or Vomiting    Vitals:  T(F): 97.1 (11-12-24 @ 05:45), Max: 97.8 (11-11-24 @ 23:27)  HR: 99 (11-12-24 @ 08:15) (85 - 116)  BP: 124/71 (11-12-24 @ 04:00) (108/60 - 124/71)  RR: 21 (11-12-24 @ 07:00) (17 - 22)  SpO2: 92% (11-12-24 @ 08:15) (89% - 97%)  Wt(kg): --145.5kg    11-11 @ 07:01  -  11-12 @ 07:00  --------------------------------------------------------  IN:    Oral Fluid: 1055 mL  Total IN: 1055 mL    OUT:    Voided (mL): 2000 mL  Total OUT: 2000 mL    Total NET: -945 mL    PHYSICAL EXAM:  Neuro: Awake, responsive  CV: S1 S2 irreg irregular   Lungs: CTABL  GI: Soft, BS +, ND, NT  Extremities: No edema    TELEMETRY: afib    RADIOLOGY: < from: Xray Chest 1 View- PORTABLE-Routine (Xray Chest 1 View- PORTABLE-Routine in AM.) (11.08.24 @ 07:15) >  Frontal expiratory view of the chest shows the heart to be similar in   size. The lungs show partial clearing of the left lung with similar small   right effusion and there is no evidence of pneumothorax nor left pleural   effusion.    IMPRESSION:  Left lung clearing.    < end of copied text >    DIAGNOSTIC TESTING:    [x ] Echocardiogram: < from: TTE Echo Complete w/ Contrast w/ Doppler (11.01.24 @ 10:28) >   1. Left ventricular systolic function is mildly to moderately decreased   with an ejection fraction visually estimated at 45 to 50 %.   2. There is severe (grade 3) left ventricular diastolic dysfunction.   3. Enlarged right ventricular cavity size and reduced right ventricular   systolic function.   4. Left atrium is mildly dilated.   5. Thickened mitral valve leaflets.   6. Mild mitral valve leaflet calcification.   7. There is mild calcification of the mitral valve annulus.   8. Trace mitral regurgitation.   9. Moderate tricuspid regurgitation.  10. Fibrocalcific aortic valve sclerosis without stenosis.  11. No pericardial effusion seen.    < end of copied text >    LABS:	 	    12 Nov 2024 03:30    137    |  101    |  38     ----------------------------<  116    3.9     |  31     |  0.90   11 Nov 2024 03:10    137    |  99     |  36     ----------------------------<  118    3.6     |  33     |  0.93   10 Nov 2024 03:50    138    |  98     |  33     ----------------------------<  111    3.7     |  34     |  0.88     Ca    9.0        12 Nov 2024 03:30  Phos  4.1       12 Nov 2024 03:30  Mg     2.0       12 Nov 2024 03:30    TPro  7.2    /  Alb  2.9    /  TBili  0.9    /  DBili  x      /  AST  47     /  ALT  47     /  AlkPhos  63     12 Nov 2024 03:30                        15.2   11.22 )-----------( 287      ( 12 Nov 2024 03:30 )             49.6 ,                       14.9   10.58 )-----------( 262      ( 11 Nov 2024 03:10 )             48.5 ,                       14.8   10.04 )-----------( 275      ( 10 Nov 2024 03:50 )             47.9   pro-BNP: Pro-Brain Natriuretic Peptide: 1378 pg/mL (11.11.24 @ 03:10)                     Patient is a 68y old  Male who presents with New onset Afib & CHF (12 Nov 2024 08:24)    PAST MEDICAL & SURGICAL HISTORY:  Hypertension    Gout    Hypercholesteremia    Morbid obesity    PVD    INTERVAL HISTORY: resting in chair in no acute distress, denies any chest pain or sob, no c/o palpitation   	  MEDICATIONS:  MEDICATIONS  (STANDING):  allopurinol 300 milliGRAM(s) Oral daily  apixaban 5 milliGRAM(s) Oral every 12 hours  artificial  tears Solution 1 Drop(s) Left EYE every 12 hours  aspirin  chewable 81 milliGRAM(s) Oral daily  buMETAnide 4 milliGRAM(s) Oral daily  chlorhexidine 2% Cloths 1 Application(s) Topical <User Schedule>  collagenase Ointment 1 Application(s) Topical daily  digoxin     Tablet 250 MICROGram(s) Oral daily  losartan 25 milliGRAM(s) Oral every 24 hours  metoprolol succinate ER 50 milliGRAM(s) Oral two times a day  polyethylene glycol 3350 17 Gram(s) Oral daily  rosuvastatin 5 milliGRAM(s) Oral at bedtime  senna 2 Tablet(s) Oral at bedtime  spironolactone 50 milliGRAM(s) Oral daily    MEDICATIONS  (PRN):  acetaminophen     Tablet .. 650 milliGRAM(s) Oral every 6 hours PRN Temp greater or equal to 38C (100.4F), Mild Pain (1 - 3)  benzocaine/menthol Lozenge 1 Lozenge Oral every 6 hours PRN Sore Throat  bisacodyl 5 milliGRAM(s) Oral every 12 hours PRN Constipation  melatonin 6 milliGRAM(s) Oral at bedtime PRN Insomnia  ondansetron Injectable 4 milliGRAM(s) IV Push every 8 hours PRN Nausea and/or Vomiting    Vitals:  T(F): 97.1 (11-12-24 @ 05:45), Max: 97.8 (11-11-24 @ 23:27)  HR: 99 (11-12-24 @ 08:15) (85 - 116)  BP: 124/71 (11-12-24 @ 04:00) (108/60 - 124/71)  RR: 21 (11-12-24 @ 07:00) (17 - 22)  SpO2: 92% (11-12-24 @ 08:15) (89% - 97%)  Wt(kg): --145.5kg    11-11 @ 07:01  -  11-12 @ 07:00  --------------------------------------------------------  IN:    Oral Fluid: 1055 mL  Total IN: 1055 mL    OUT:    Voided (mL): 2000 mL  Total OUT: 2000 mL    Total NET: -945 mL    PHYSICAL EXAM:  Neuro: Awake, responsive  CV: S1 S2 irreg irregular   Lungs: diminished to bases  GI: Soft, BS +, ND, NT  Extremities: Trace LE edema with dsg intact to Rt lower leg    TELEMETRY: afib    RADIOLOGY: < from: Xray Chest 1 View- PORTABLE-Routine (Xray Chest 1 View- PORTABLE-Routine in AM.) (11.08.24 @ 07:15) >  Frontal expiratory view of the chest shows the heart to be similar in   size. The lungs show partial clearing of the left lung with similar small   right effusion and there is no evidence of pneumothorax nor left pleural   effusion.    IMPRESSION:  Left lung clearing.    < end of copied text >    DIAGNOSTIC TESTING:    [x ] Echocardiogram: < from: TTE Echo Complete w/ Contrast w/ Doppler (11.01.24 @ 10:28) >   1. Left ventricular systolic function is mildly to moderately decreased   with an ejection fraction visually estimated at 45 to 50 %.   2. There is severe (grade 3) left ventricular diastolic dysfunction.   3. Enlarged right ventricular cavity size and reduced right ventricular   systolic function.   4. Left atrium is mildly dilated.   5. Thickened mitral valve leaflets.   6. Mild mitral valve leaflet calcification.   7. There is mild calcification of the mitral valve annulus.   8. Trace mitral regurgitation.   9. Moderate tricuspid regurgitation.  10. Fibrocalcific aortic valve sclerosis without stenosis.  11. No pericardial effusion seen.    < end of copied text >    LABS:	 	    12 Nov 2024 03:30    137    |  101    |  38     ----------------------------<  116    3.9     |  31     |  0.90   11 Nov 2024 03:10    137    |  99     |  36     ----------------------------<  118    3.6     |  33     |  0.93   10 Nov 2024 03:50    138    |  98     |  33     ----------------------------<  111    3.7     |  34     |  0.88     Ca    9.0        12 Nov 2024 03:30  Phos  4.1       12 Nov 2024 03:30  Mg     2.0       12 Nov 2024 03:30    TPro  7.2    /  Alb  2.9    /  TBili  0.9    /  DBili  x      /  AST  47     /  ALT  47     /  AlkPhos  63     12 Nov 2024 03:30                        15.2   11.22 )-----------( 287      ( 12 Nov 2024 03:30 )             49.6 ,                       14.9   10.58 )-----------( 262      ( 11 Nov 2024 03:10 )             48.5 ,                       14.8   10.04 )-----------( 275      ( 10 Nov 2024 03:50 )             47.9   pro-BNP: Pro-Brain Natriuretic Peptide: 1378 pg/mL (11.11.24 @ 03:10)

## 2024-11-12 NOTE — PROGRESS NOTE ADULT - ASSESSMENT
68M morbidly obese w/ HTN, HLD, PVD. Admitted w/ new onset Rapid Afib, decompensated HF, and acute hypoxemic respiratory failure. Admitted to icu for resp failure and afib with RVR which improved with diuretics. Now improved and downgraded to medicine. Pulm consulted for follow up.    recs:  - admitted to ICU for HRF on [place] NC bumex and dilt drip. Both FiO2 requirements and RVR improved with extensive diuresis. Now ~30L negative over course of ICU stay.   -satting well on 4LNC. cont to titrate as able. Spo2 >88%  -TTE with grade 3 diastolic dysfx and RV dysfx.   - cont PO diuretics and rate control per cards   - cont to titrate down FiO2 as able   - CPAP qhs for SHILOH  - Pt will need outpt sleep study, PFTs, and pulm follow up. Please call [phone] or email  [email or web address] for an appointment at the Pulmonary office ([address], [address], [place], [place]).   - Rest of care per primary team    NOTE INCOMPLETE   68M morbidly obese w/ HTN, HLD, PVD. Admitted w/ new onset Rapid Afib, decompensated HF, and acute hypoxemic respiratory failure. Admitted to icu for resp failure and afib with RVR which improved with diuretics. Now improved and downgraded to medicine. Pulm consulted for follow up.    recs:  - admitted to ICU for HRF on [place] NC bumex and dilt drip. Both FiO2 requirements and RVR improved with extensive diuresis. Now ~30L negative over course of ICU stay.   -satting well on 4LNC. cont to titrate as able. Spo2 >88%  -TTE with grade 3 diastolic dysfx and RV dysfx.   - cont PO diuretics and rate control per cards   - cont to titrate down FiO2 as able   - CPAP qhs for SHILOH  - Pt will need outpt sleep study, PFTs, and pulm follow up. Please call [phone] or email  [email or web address] for an appointment at the Pulmonary office ([address], [address], [place], [place]).   - Rest of care per primary team    Case discussed with Dr. Claudio.

## 2024-11-12 NOTE — PROGRESS NOTE ADULT - ASSESSMENT
67 y/o male with a pmhx of Hypertension, Gout, Hypercholesteremia, and Morbid obesity admitted with Decompensated HF with fluid overload, and AF with RVR.    TTE with EF 45-50%,  grade III DD,  Enlarged right ventricular cavity size and reduced right ventricular systolic function. mod TR  off lasix drip and cardizem drip, HR 80-110s  BNP 5400 ->3200->1600 ->1300    -currently on Bumex 4 mg  po daily, aldactone 50 mg daily  -losartan 25 daily   -Strict I&O, daily weights, diuresing well with significant weight loss  -fluid restriction to 1500ml, nutrition eval appreciated   -Replete Mg to 2, and K+ to 4, monitor renal function closely, creat .96 today  -s/p Diamox dosing for elevated bicarb   -cont on Toprol 50 po bid and dig .25mg po daily, Dig level .71 11/8  -cont on Eliquis 5mg bid for AC  -Currently on 4L O2 via [place], pt states the he has been told his SPO2 runs low, titrate down FiO2 as tolerated  -increase activity as tolerated, pt requesting to ambulate more, discussed with PT   -Incentive spirometry   -outpatient follow up with EP cardiology Dr. Leonie Quinn, outpatient sleep study

## 2024-11-12 NOTE — PROGRESS NOTE ADULT - SUBJECTIVE AND OBJECTIVE BOX
INTERVAL HPI/OVERNIGHT EVENTS: No acute events overnight. Satting well on 4L NC.     SUBJECTIVE: Patient seen and examined at bedside.     ROS: All negative except as listed above.    VITAL SIGNS:  ICU Vital Signs Last 24 Hrs  T(C): 36.2 (12 Nov 2024 05:45), Max: 36.6 (11 Nov 2024 23:27)  T(F): 97.1 (12 Nov 2024 05:45), Max: 97.8 (11 Nov 2024 23:27)  HR: 99 (12 Nov 2024 08:15) (85 - 116)  BP: 124/71 (12 Nov 2024 04:00) (108/60 - 124/71)  BP(mean): 85 (12 Nov 2024 04:00) (72 - 106)  ABP: --  ABP(mean): --  RR: 21 (12 Nov 2024 07:00) (17 - 22)  SpO2: 92% (12 Nov 2024 08:15) (89% - 97%)        Plateau pressure:   P/F ratio:     11-11 @ 07:01  -  11-12 @ 07:00  --------------------------------------------------------  IN: 1055 mL / OUT: 2000 mL / NET: -945 mL      CAPILLARY BLOOD GLUCOSE          ECG: reviewed.    PHYSICAL EXAM:    GENERAL: obese, NAD, sitting in chair comfortably  HEAD:  Atraumatic, normocephalic  EYES: EOMI, PERRL  HEART: irregular rate and rhythm  LUNGS: Unlabored respirations. diminished BS bialt  ABDOMEN: Soft, nontender, nondistended  EXTREMITIES: warm, chronic LE edema   NERVOUS SYSTEM:  A&Ox3, moving all extremities, no focal deficits     MEDICATIONS:  MEDICATIONS  (STANDING):  allopurinol 300 milliGRAM(s) Oral daily  apixaban 5 milliGRAM(s) Oral every 12 hours  artificial  tears Solution 1 Drop(s) Left EYE every 12 hours  aspirin  chewable 81 milliGRAM(s) Oral daily  buMETAnide 4 milliGRAM(s) Oral daily  chlorhexidine 2% Cloths 1 Application(s) Topical <User Schedule>  collagenase Ointment 1 Application(s) Topical daily  digoxin     Tablet 250 MICROGram(s) Oral daily  influenza  Vaccine (HIGH DOSE) 0.5 milliLiter(s) IntraMuscular once  losartan 25 milliGRAM(s) Oral every 24 hours  metoprolol succinate ER 50 milliGRAM(s) Oral two times a day  polyethylene glycol 3350 17 Gram(s) Oral daily  rosuvastatin 5 milliGRAM(s) Oral at bedtime  senna 2 Tablet(s) Oral at bedtime  spironolactone 50 milliGRAM(s) Oral daily    MEDICATIONS  (PRN):  acetaminophen     Tablet .. 650 milliGRAM(s) Oral every 6 hours PRN Temp greater or equal to 38C (100.4F), Mild Pain (1 - 3)  benzocaine/menthol Lozenge 1 Lozenge Oral every 6 hours PRN Sore Throat  bisacodyl 5 milliGRAM(s) Oral every 12 hours PRN Constipation  melatonin 6 milliGRAM(s) Oral at bedtime PRN Insomnia  ondansetron Injectable 4 milliGRAM(s) IV Push every 8 hours PRN Nausea and/or Vomiting      ALLERGIES:  Allergies    niacin (Other)    Intolerances        LABS:                        15.2   11.22 )-----------( 287      ( 12 Nov 2024 03:30 )             49.6     11-12    137  |  101  |  38[H]  ----------------------------<  116[H]  3.9   |  31  |  0.90    Ca    9.0      12 Nov 2024 03:30  Phos  4.1     11-12  Mg     2.0     11-12    TPro  7.2  /  Alb  2.9[L]  /  TBili  0.9  /  DBili  x   /  AST  47[H]  /  ALT  47  /  AlkPhos  63  11-12      Urinalysis Basic - ( 12 Nov 2024 03:30 )    Color: x / Appearance: x / SG: x / pH: x  Gluc: 116 mg/dL / Ketone: x  / Bili: x / Urobili: x   Blood: x / Protein: x / Nitrite: x   Leuk Esterase: x / RBC: x / WBC x   Sq Epi: x / Non Sq Epi: x / Bacteria: x      ABG:      vBG:    Micro:    Culture - Blood (collected 11-01-24 @ 02:20)  Source: .Blood BLOOD  Final Report (11-06-24 @ 10:01):    No growth at 5 days    Culture - Blood (collected 11-01-24 @ 02:20)  Source: .Blood BLOOD  Final Report (11-06-24 @ 10:01):    No growth at 5 days          RADIOLOGY & ADDITIONAL TESTS: Reviewed.   INTERVAL HPI/OVERNIGHT EVENTS: No acute events overnight. Satting well on 4L NC.     SUBJECTIVE: Patient seen and examined at bedside. No acute complaints at this time. Denies SOB/chest pain.     ROS: All negative except as listed above.    VITAL SIGNS:  ICU Vital Signs Last 24 Hrs  T(C): 36.2 (12 Nov 2024 05:45), Max: 36.6 (11 Nov 2024 23:27)  T(F): 97.1 (12 Nov 2024 05:45), Max: 97.8 (11 Nov 2024 23:27)  HR: 99 (12 Nov 2024 08:15) (85 - 116)  BP: 124/71 (12 Nov 2024 04:00) (108/60 - 124/71)  BP(mean): 85 (12 Nov 2024 04:00) (72 - 106)  ABP: --  ABP(mean): --  RR: 21 (12 Nov 2024 07:00) (17 - 22)  SpO2: 92% (12 Nov 2024 08:15) (89% - 97%)        Plateau pressure:   P/F ratio:     11-11 @ 07:01  -  11-12 @ 07:00  --------------------------------------------------------  IN: 1055 mL / OUT: 2000 mL / NET: -945 mL      CAPILLARY BLOOD GLUCOSE          ECG: reviewed.    PHYSICAL EXAM:    GENERAL: obese, NAD, sitting in chair comfortably  HEAD:  Atraumatic, normocephalic  EYES: EOMI, PERRL  HEART: irregular rate and rhythm  LUNGS: Unlabored respirations.   ABDOMEN: Soft, nontender, nondistended  EXTREMITIES: warm, chronic LE edema   NERVOUS SYSTEM:  A&Ox3, moving all extremities, no focal deficits     MEDICATIONS:  MEDICATIONS  (STANDING):  allopurinol 300 milliGRAM(s) Oral daily  apixaban 5 milliGRAM(s) Oral every 12 hours  artificial  tears Solution 1 Drop(s) Left EYE every 12 hours  aspirin  chewable 81 milliGRAM(s) Oral daily  buMETAnide 4 milliGRAM(s) Oral daily  chlorhexidine 2% Cloths 1 Application(s) Topical <User Schedule>  collagenase Ointment 1 Application(s) Topical daily  digoxin     Tablet 250 MICROGram(s) Oral daily  influenza  Vaccine (HIGH DOSE) 0.5 milliLiter(s) IntraMuscular once  losartan 25 milliGRAM(s) Oral every 24 hours  metoprolol succinate ER 50 milliGRAM(s) Oral two times a day  polyethylene glycol 3350 17 Gram(s) Oral daily  rosuvastatin 5 milliGRAM(s) Oral at bedtime  senna 2 Tablet(s) Oral at bedtime  spironolactone 50 milliGRAM(s) Oral daily    MEDICATIONS  (PRN):  acetaminophen     Tablet .. 650 milliGRAM(s) Oral every 6 hours PRN Temp greater or equal to 38C (100.4F), Mild Pain (1 - 3)  benzocaine/menthol Lozenge 1 Lozenge Oral every 6 hours PRN Sore Throat  bisacodyl 5 milliGRAM(s) Oral every 12 hours PRN Constipation  melatonin 6 milliGRAM(s) Oral at bedtime PRN Insomnia  ondansetron Injectable 4 milliGRAM(s) IV Push every 8 hours PRN Nausea and/or Vomiting      ALLERGIES:  Allergies    niacin (Other)    Intolerances        LABS:                        15.2   11.22 )-----------( 287      ( 12 Nov 2024 03:30 )             49.6     11-12    137  |  101  |  38[H]  ----------------------------<  116[H]  3.9   |  31  |  0.90    Ca    9.0      12 Nov 2024 03:30  Phos  4.1     11-12  Mg     2.0     11-12    TPro  7.2  /  Alb  2.9[L]  /  TBili  0.9  /  DBili  x   /  AST  47[H]  /  ALT  47  /  AlkPhos  63  11-12      Urinalysis Basic - ( 12 Nov 2024 03:30 )    Color: x / Appearance: x / SG: x / pH: x  Gluc: 116 mg/dL / Ketone: x  / Bili: x / Urobili: x   Blood: x / Protein: x / Nitrite: x   Leuk Esterase: x / RBC: x / WBC x   Sq Epi: x / Non Sq Epi: x / Bacteria: x      ABG:      vBG:    Micro:    Culture - Blood (collected 11-01-24 @ 02:20)  Source: .Blood BLOOD  Final Report (11-06-24 @ 10:01):    No growth at 5 days    Culture - Blood (collected 11-01-24 @ 02:20)  Source: .Blood BLOOD  Final Report (11-06-24 @ 10:01):    No growth at 5 days          RADIOLOGY & ADDITIONAL TESTS: Reviewed.

## 2024-11-12 NOTE — PROGRESS NOTE ADULT - SUBJECTIVE AND OBJECTIVE BOX
Medicine Progress Note    Patient is a 68y old  Male who presents with a chief complaint of New onset Afib & CHF (12 Nov 2024 09:06)      SUBJECTIVE / OVERNIGHT EVENTS: A fib w/RVR    ADDITIONAL REVIEW OF SYSTEMS: negative     MEDICATIONS  (STANDING):  allopurinol 300 milliGRAM(s) Oral daily  apixaban 5 milliGRAM(s) Oral every 12 hours  artificial  tears Solution 1 Drop(s) Left EYE every 12 hours  aspirin  chewable 81 milliGRAM(s) Oral daily  buMETAnide 4 milliGRAM(s) Oral daily  chlorhexidine 2% Cloths 1 Application(s) Topical <User Schedule>  collagenase Ointment 1 Application(s) Topical daily  digoxin     Tablet 250 MICROGram(s) Oral daily  influenza  Vaccine (HIGH DOSE) 0.5 milliLiter(s) IntraMuscular once  losartan 25 milliGRAM(s) Oral every 24 hours  metoprolol succinate ER 50 milliGRAM(s) Oral two times a day  polyethylene glycol 3350 17 Gram(s) Oral daily  rosuvastatin 5 milliGRAM(s) Oral at bedtime  senna 2 Tablet(s) Oral at bedtime  spironolactone 50 milliGRAM(s) Oral daily    MEDICATIONS  (PRN):  acetaminophen     Tablet .. 650 milliGRAM(s) Oral every 6 hours PRN Temp greater or equal to 38C (100.4F), Mild Pain (1 - 3)  benzocaine/menthol Lozenge 1 Lozenge Oral every 6 hours PRN Sore Throat  bisacodyl 5 milliGRAM(s) Oral every 12 hours PRN Constipation  melatonin 6 milliGRAM(s) Oral at bedtime PRN Insomnia  ondansetron Injectable 4 milliGRAM(s) IV Push every 8 hours PRN Nausea and/or Vomiting    CAPILLARY BLOOD GLUCOSE        I&O's Summary    11 Nov 2024 07:01  -  12 Nov 2024 07:00  --------------------------------------------------------  IN: 1055 mL / OUT: 2000 mL / NET: -945 mL    12 Nov 2024 07:01  -  12 Nov 2024 16:59  --------------------------------------------------------  IN: 956 mL / OUT: 200 mL / NET: 756 mL        PHYSICAL EXAM:  Vital Signs Last 24 Hrs  T(C): 36.6 (12 Nov 2024 15:55), Max: 36.8 (12 Nov 2024 08:00)  T(F): 97.8 (12 Nov 2024 15:55), Max: 98.2 (12 Nov 2024 08:00)  HR: 109 (12 Nov 2024 14:00) (85 - 123)  BP: 129/84 (12 Nov 2024 12:00) (108/60 - 129/84)  BP(mean): 99 (12 Nov 2024 12:00) (72 - 99)  RR: 24 (12 Nov 2024 14:00) (17 - 24)  SpO2: 94% (12 Nov 2024 14:00) (89% - 97%)      CONSTITUTIONAL: NAD,   ENMT: Moist oral mucosa, no pharyngeal injection or exudates;  RESPIRATORY: Normal respiratory effort; lungs are dim  to auscultation bilaterally  CARDIOVASCULAR: Irregularly irregular  rate and rhythm, trace  lower extremity edema; RLE wound   ABDOMEN: Nontender to palpation, normoactive bowel sounds, no rebound/guarding;   PSYCH: A+O to person, place, and time; affect appropriate  NEUROLOGY: CN 2-12 are intact and symmetric; no gross sensory deficits   SKIN: No rashes;  wound as above   LABS:                        15.2   11.22 )-----------( 287      ( 12 Nov 2024 03:30 )             49.6     11-12    137  |  101  |  38[H]  ----------------------------<  116[H]  3.9   |  31  |  0.90    Ca    9.0      12 Nov 2024 03:30  Phos  4.1     11-12  Mg     2.0     11-12    TPro  7.2  /  Alb  2.9[L]  /  TBili  0.9  /  DBili  x   /  AST  47[H]  /  ALT  47  /  AlkPhos  63  11-12          Urinalysis Basic - ( 12 Nov 2024 03:30 )    Color: x / Appearance: x / SG: x / pH: x  Gluc: 116 mg/dL / Ketone: x  / Bili: x / Urobili: x   Blood: x / Protein: x / Nitrite: x   Leuk Esterase: x / RBC: x / WBC x   Sq Epi: x / Non Sq Epi: x / Bacteria: x            RADIOLOGY & ADDITIONAL TESTS:  Imaging from Last 24 Hours:    Electrocardiogram/QTc Interval:    COORDINATION OF CARE:  Care Discussed with Consultants/Other Providers: RN

## 2024-11-13 LAB
ALBUMIN SERPL ELPH-MCNC: 3.1 G/DL — LOW (ref 3.3–5)
ALP SERPL-CCNC: 69 U/L — SIGNIFICANT CHANGE UP (ref 40–120)
ALT FLD-CCNC: 45 U/L — SIGNIFICANT CHANGE UP (ref 12–78)
ANION GAP SERPL CALC-SCNC: 6 MMOL/L — SIGNIFICANT CHANGE UP (ref 5–17)
AST SERPL-CCNC: 39 U/L — HIGH (ref 15–37)
BASOPHILS # BLD AUTO: 0.08 K/UL — SIGNIFICANT CHANGE UP (ref 0–0.2)
BASOPHILS NFR BLD AUTO: 0.7 % — SIGNIFICANT CHANGE UP (ref 0–2)
BILIRUB SERPL-MCNC: 1 MG/DL — SIGNIFICANT CHANGE UP (ref 0.2–1.2)
BUN SERPL-MCNC: 35 MG/DL — HIGH (ref 7–23)
CALCIUM SERPL-MCNC: 9.4 MG/DL — SIGNIFICANT CHANGE UP (ref 8.5–10.1)
CHLORIDE SERPL-SCNC: 98 MMOL/L — SIGNIFICANT CHANGE UP (ref 96–108)
CO2 SERPL-SCNC: 34 MMOL/L — HIGH (ref 22–31)
CREAT SERPL-MCNC: 0.96 MG/DL — SIGNIFICANT CHANGE UP (ref 0.5–1.3)
EGFR: 86 ML/MIN/1.73M2 — SIGNIFICANT CHANGE UP
EOSINOPHIL # BLD AUTO: 0.39 K/UL — SIGNIFICANT CHANGE UP (ref 0–0.5)
EOSINOPHIL NFR BLD AUTO: 3.3 % — SIGNIFICANT CHANGE UP (ref 0–6)
GLUCOSE SERPL-MCNC: 116 MG/DL — HIGH (ref 70–99)
HCT VFR BLD CALC: 49.8 % — SIGNIFICANT CHANGE UP (ref 39–50)
HGB BLD-MCNC: 15.5 G/DL — SIGNIFICANT CHANGE UP (ref 13–17)
IMM GRANULOCYTES NFR BLD AUTO: 0.8 % — SIGNIFICANT CHANGE UP (ref 0–0.9)
LYMPHOCYTES # BLD AUTO: 17.9 % — SIGNIFICANT CHANGE UP (ref 13–44)
LYMPHOCYTES # BLD AUTO: 2.14 K/UL — SIGNIFICANT CHANGE UP (ref 1–3.3)
MAGNESIUM SERPL-MCNC: 1.8 MG/DL — SIGNIFICANT CHANGE UP (ref 1.6–2.6)
MCHC RBC-ENTMCNC: 19.4 PG — LOW (ref 27–34)
MCHC RBC-ENTMCNC: 31.1 G/DL — LOW (ref 32–36)
MCV RBC AUTO: 62.3 FL — LOW (ref 80–100)
MONOCYTES # BLD AUTO: 1.08 K/UL — HIGH (ref 0–0.9)
MONOCYTES NFR BLD AUTO: 9 % — SIGNIFICANT CHANGE UP (ref 2–14)
NEUTROPHILS # BLD AUTO: 8.17 K/UL — HIGH (ref 1.8–7.4)
NEUTROPHILS NFR BLD AUTO: 68.3 % — SIGNIFICANT CHANGE UP (ref 43–77)
NRBC # BLD: 0 /100 WBCS — SIGNIFICANT CHANGE UP (ref 0–0)
PLATELET # BLD AUTO: 301 K/UL — SIGNIFICANT CHANGE UP (ref 150–400)
POTASSIUM SERPL-MCNC: 3.7 MMOL/L — SIGNIFICANT CHANGE UP (ref 3.5–5.3)
POTASSIUM SERPL-SCNC: 3.7 MMOL/L — SIGNIFICANT CHANGE UP (ref 3.5–5.3)
PROT SERPL-MCNC: 7.4 GM/DL — SIGNIFICANT CHANGE UP (ref 6–8.3)
RBC # BLD: 8 M/UL — HIGH (ref 4.2–5.8)
RBC # FLD: 18.6 % — HIGH (ref 10.3–14.5)
SODIUM SERPL-SCNC: 138 MMOL/L — SIGNIFICANT CHANGE UP (ref 135–145)
WBC # BLD: 11.96 K/UL — HIGH (ref 3.8–10.5)
WBC # FLD AUTO: 11.96 K/UL — HIGH (ref 3.8–10.5)

## 2024-11-13 PROCEDURE — 99233 SBSQ HOSP IP/OBS HIGH 50: CPT

## 2024-11-13 RX ORDER — DAPAGLIFLOZIN 10 MG/1
10 TABLET, FILM COATED ORAL DAILY
Refills: 0 | Status: DISCONTINUED | OUTPATIENT
Start: 2024-11-13 | End: 2024-11-14

## 2024-11-13 RX ORDER — MAGNESIUM SULFATE IN 0.9% NACL 2 G/50 ML
2 INTRAVENOUS SOLUTION, PIGGYBACK (ML) INTRAVENOUS ONCE
Refills: 0 | Status: COMPLETED | OUTPATIENT
Start: 2024-11-13 | End: 2024-11-13

## 2024-11-13 RX ORDER — POTASSIUM CHLORIDE 10 MEQ
40 TABLET, EXTENDED RELEASE ORAL ONCE
Refills: 0 | Status: COMPLETED | OUTPATIENT
Start: 2024-11-13 | End: 2024-11-13

## 2024-11-13 RX ADMIN — Medication 1 DROP(S): at 18:05

## 2024-11-13 RX ADMIN — Medication 25 GRAM(S): at 18:11

## 2024-11-13 RX ADMIN — Medication 50 MILLIGRAM(S): at 05:18

## 2024-11-13 RX ADMIN — Medication 1 DROP(S): at 05:19

## 2024-11-13 RX ADMIN — Medication 1 APPLICATION(S): at 12:23

## 2024-11-13 RX ADMIN — Medication 40 MILLIEQUIVALENT(S): at 18:09

## 2024-11-13 RX ADMIN — Medication 5 MILLIGRAM(S): at 22:18

## 2024-11-13 RX ADMIN — Medication 300 MILLIGRAM(S): at 12:07

## 2024-11-13 RX ADMIN — APIXABAN 5 MILLIGRAM(S): 5 TABLET, FILM COATED ORAL at 05:18

## 2024-11-13 RX ADMIN — APIXABAN 5 MILLIGRAM(S): 5 TABLET, FILM COATED ORAL at 18:09

## 2024-11-13 RX ADMIN — Medication 50 MILLIGRAM(S): at 18:09

## 2024-11-13 RX ADMIN — Medication 81 MILLIGRAM(S): at 12:07

## 2024-11-13 RX ADMIN — Medication 250 MICROGRAM(S): at 05:18

## 2024-11-13 RX ADMIN — Medication 6 MILLIGRAM(S): at 22:18

## 2024-11-13 RX ADMIN — CHLORHEXIDINE GLUCONATE 1 APPLICATION(S): 40 SOLUTION TOPICAL at 05:17

## 2024-11-13 RX ADMIN — DAPAGLIFLOZIN 10 MILLIGRAM(S): 10 TABLET, FILM COATED ORAL at 18:08

## 2024-11-13 RX ADMIN — LOSARTAN POTASSIUM 25 MILLIGRAM(S): 25 TABLET ORAL at 12:07

## 2024-11-13 RX ADMIN — Medication 4 MILLIGRAM(S): at 05:18

## 2024-11-13 NOTE — PROGRESS NOTE ADULT - NS ATTEND OPT1 GEN_ALL_CORE
I independently performed the documented:
I attest my time as attending is greater than 50% of the total combined time spent on qualifying patient care activities by the PA/NP and attending.
I attest my time as attending is greater than 50% of the total combined time spent on qualifying patient care activities by the PA/NP and attending.
I independently performed the documented:
I independently performed the documented:

## 2024-11-13 NOTE — PROGRESS NOTE ADULT - PROBLEM SELECTOR PLAN 4
allopurinol 300 milliGRAM(s) Oral daily
Continue home meds   - Allopurinol
allopurinol 300 milliGRAM(s) Oral daily
allopurinol 300 milliGRAM(s) Oral daily

## 2024-11-13 NOTE — PROGRESS NOTE ADULT - ASSESSMENT
67 y/o male with a pmhx of Hypertension, Gout, Hypercholesteremia, and Morbid obesity admitted with Decompensated HF with fluid overload, and AF with RVR.    TTE with EF 45-50%,  grade III DD,  Enlarged right ventricular cavity size and reduced right ventricular systolic function. mod TR  off lasix drip and cardizem drip, HR stable now  BNP 5400 ->3200->1600 ->1300    -currently on Bumex 4 mg  po daily, aldactone 50 mg daily  -cont losartan 25 daily   -Strict I&O, daily weights, diuresing well with significant weight loss  -fluid restriction to 1500ml, nutrition eval appreciated   -Replete Mg to 2, and keep K+ to 4 especially while on , monitor renal function closely, creat .96 today  -s/p Diamox dosing for elevated bicarb   -cont on Toprol 50 po bid and dig .25mg po daily, Dig level .71 11/8  -cont on Eliquis 5mg bid for AC  -Currently on 4L O2 via [place], pt states the he has been told his SPO2 runs low, titrate down FiO2 as tolerated, needs outpatient sleep study    -increase activity as tolerated, pt requesting to ambulate more and would prefer to go home instead of rehab, discussed with PT   -Incentive spirometry   -Should be seen by an EP dr after d/c, can follow up with Dr. Leonie Quinn as outpatient, EP cardiology

## 2024-11-13 NOTE — PROGRESS NOTE ADULT - ASSESSMENT
8M morbidly obese w/ HTN, HLD, PVD. Admitted w/ new onset Rapid Afib, decompensated HF, and acute hypoxemic respiratory failure. Admitted to icu for resp failure and afib with RVR which improved with diuretics. Now improved and downgraded to medicine. Pulm consulted for follow up.    recs:  - admitted to ICU for resp failure in the setting of HRF on [place] NC& bumex and dilt drip. Both FiO2 requirements and RVR improved with extensive diuresis.   - now ~38L negative over hospital course   - satting well on 4LNC. cont to titrate as able. Spo2 >88% cont to titrate down FiO2 as able   - bipap HS for underlying SHILOH   - TTE with grade 3 diastolic dysfx and RV dysfx.   - elevated pasp likely secondary to severe volume overload and HF (group 2 ) but needs repeat outpatient echo when euvolemic to ensure improved and evaluate RV/pasp  - cont PO diuretics and rate control per cards   - Pt will need outpt sleep study, PFTs, and pulm follow up. Please call [phone] or email  [email or web address] for an appointment at the Pulmonary office ([address], [address], [place], [place]).   - Rest of care per primary team     8M morbidly obese w/ HTN, HLD, PVD. Admitted w/ new onset Rapid Afib, decompensated HF, and acute hypoxemic respiratory failure. Admitted to icu for resp failure and afib with RVR which improved with diuretics. Now improved and downgraded to medicine. Pulm consulted for follow up.    recs:  - admitted to ICU for resp failure in the setting of HRF on [place] NC& bumex and dilt drip. Both FiO2 requirements and RVR improved with extensive diuresis.   - now ~38L negative over hospital course   - satting well on 4LNC. cont to titrate as able. Spo2 >88% cont to titrate down FiO2 as able   - bipap HS for underlying SHILOH   - TTE with grade 3 diastolic dysfx and RV dysfx.   - elevated pasp likely secondary to severe volume overload and HF (group 2 ) but needs repeat outpatient echo when euvolemic to ensure improved and evaluate RV/pasp  - cont PO diuretics and rate control per cards   - PT/OT/OOB; will need ambulatory saturation   - Pt will need outpt sleep study, PFTs, and pulm follow up. Please call [phone] or email  [email or web address] for an appointment at the Pulmonary office ([address], [address], [place], [place]).   - Rest of care per primary team

## 2024-11-13 NOTE — PROGRESS NOTE ADULT - PROBLEM SELECTOR PLAN 3
BP at goal  C/w Antihypertensives as above
BP at goal  C/w Antihypertensives as above
Normotensive   Continue home meds   - Metoprolol succ 100mg   - Monitor BP
BP at goal  C/w Antihypertensives as above

## 2024-11-13 NOTE — PROGRESS NOTE ADULT - SUBJECTIVE AND OBJECTIVE BOX
Patient is a 68y old  Male who presents with New onset Afib & CHF (13 Nov 2024 13:31)    PAST MEDICAL & SURGICAL HISTORY:  Hypertension    Gout    Hypercholesteremia    Morbid obesity    H/O colonoscopy    INTERVAL HISTORY: in no acute distress  	  MEDICATIONS:  MEDICATIONS  (STANDING):  allopurinol 300 milliGRAM(s) Oral daily  apixaban 5 milliGRAM(s) Oral every 12 hours  artificial  tears Solution 1 Drop(s) Left EYE every 12 hours  buMETAnide 4 milliGRAM(s) Oral daily  chlorhexidine 2% Cloths 1 Application(s) Topical <User Schedule>  collagenase Ointment 1 Application(s) Topical daily  digoxin     Tablet 250 MICROGram(s) Oral daily  losartan 25 milliGRAM(s) Oral every 24 hours  magnesium sulfate  IVPB 2 Gram(s) IV Intermittent once  metoprolol succinate ER 50 milliGRAM(s) Oral two times a day  polyethylene glycol 3350 17 Gram(s) Oral daily  potassium chloride    Tablet ER 40 milliEquivalent(s) Oral once  rosuvastatin 5 milliGRAM(s) Oral at bedtime  senna 2 Tablet(s) Oral at bedtime  spironolactone 50 milliGRAM(s) Oral daily    MEDICATIONS  (PRN):  acetaminophen     Tablet .. 650 milliGRAM(s) Oral every 6 hours PRN Temp greater or equal to 38C (100.4F), Mild Pain (1 - 3)  benzocaine/menthol Lozenge 1 Lozenge Oral every 6 hours PRN Sore Throat  bisacodyl 5 milliGRAM(s) Oral every 12 hours PRN Constipation  melatonin 6 milliGRAM(s) Oral at bedtime PRN Insomnia  ondansetron Injectable 4 milliGRAM(s) IV Push every 8 hours PRN Nausea and/or Vomiting    Vitals:  T(F): 97.2 (11-13-24 @ 10:38), Max: 98.1 (11-13-24 @ 04:52)  HR: 85 (11-13-24 @ 15:01) (77 - 108)  BP: 102/67 (11-13-24 @ 15:01) (97/60 - 122/78)  RR: 16 (11-13-24 @ 16:10) (12 - 24)  SpO2: 93% (11-13-24 @ 16:10) (92% - 96%)    11-12 @ 07:01 - 11-13 @ 07:00  --------------------------------------------------------  IN:    Oral Fluid: 1174 mL  Total IN: 1174 mL    OUT:    Voided (mL): 575 mL  Total OUT: 575 mL    Total NET: 599 mL    11-13 @ 07:01 - 11-13 @ 16:24  --------------------------------------------------------  IN:    Oral Fluid: 360 mL  Total IN: 360 mL    OUT:  Total OUT: 0 mL    Total NET: 360 mL    PHYSICAL EXAM:  Neuro: Awake, responsive  CV: S1 S2 irreg irregular   Lungs: diminished to bases   GI: Soft, BS +, ND, NT  Extremities: Trace LE edema, Rt LE wound dsg intact    TELEMETRY: afib    RADIOLOGY: < from: Xray Chest 1 View- PORTABLE-Routine (Xray Chest 1 View- PORTABLE-Routine in AM.) (11.08.24 @ 07:15) >  Frontal expiratory view of the chest shows the heart to be similar in   size. The lungs show partial clearing of the left lung with similar small   right effusion and there is no evidence of pneumothorax nor left pleural   effusion.    IMPRESSION:  Left lung clearing.    < end of copied text >    DIAGNOSTIC TESTING:    [x ] Echocardiogram: < from: TTE Echo Complete w/ Contrast w/ Doppler (11.01.24 @ 10:28) >   1. Left ventricular systolic function is mildly to moderately decreased   with an ejection fraction visually estimated at 45 to 50 %.   2. There is severe (grade 3) left ventricular diastolic dysfunction.   3. Enlarged right ventricular cavity size and reduced right ventricular   systolic function.   4. Left atrium is mildly dilated.   5. Thickened mitral valve leaflets.   6. Mild mitral valve leaflet calcification.   7. There is mild calcification of the mitral valve annulus.   8. Trace mitral regurgitation.   9. Moderate tricuspid regurgitation.  10. Fibrocalcific aortic valve sclerosis without stenosis.  11. No pericardial effusion seen.    < end of copied text >    LABS:	 	    13 Nov 2024 07:34    138    |  98     |  35     ----------------------------<  116    3.7     |  34     |  0.96   12 Nov 2024 03:30    137    |  101    |  38     ----------------------------<  116    3.9     |  31     |  0.90   11 Nov 2024 03:10    137    |  99     |  36     ----------------------------<  118    3.6     |  33     |  0.93     Ca    9.4        13 Nov 2024 07:34  Phos  4.1       12 Nov 2024 03:30  Mg     1.8       13 Nov 2024 07:34    TPro  7.4    /  Alb  3.1    /  TBili  1.0    /  DBili  x      /  AST  39     /  ALT  45     /  AlkPhos  69     13 Nov 2024 07:34                        15.5   11.96 )-----------( 301      ( 13 Nov 2024 07:34 )             49.8 ,                       15.2   11.22 )-----------( 287      ( 12 Nov 2024 03:30 )             49.6 ,                       14.9   10.58 )-----------( 262      ( 11 Nov 2024 03:10 )             48.5   pro-BNP: Pro-Brain Natriuretic Peptide: 1378 pg/mL (11.11.24 @ 03:10)

## 2024-11-13 NOTE — PROGRESS NOTE ADULT - SUBJECTIVE AND OBJECTIVE BOX
INTERVAL HPI/OVERNIGHT EVENTS: No acute events; tolerating 4L NC during day, bipap HS   SUBJECTIVE: Patient seen and examined at bedside.   ROS: All negative except as listed above.    VITAL SIGNS:  Vital Signs Last 24 Hrs  T(C): 36.7 (13 Nov 2024 04:52), Max: 36.9 (12 Nov 2024 12:00)  T(F): 98.1 (13 Nov 2024 04:52), Max: 98.4 (12 Nov 2024 12:00)  HR: 90 (13 Nov 2024 05:01) (77 - 123)  BP: 120/80 (13 Nov 2024 04:52) (97/60 - 129/84)  BP(mean): 72 (12 Nov 2024 18:10) (72 - 102)  ABP: --  ABP(mean): --  RR: 18 (13 Nov 2024 04:52) (18 - 27)  SpO2: 94% (13 Nov 2024 05:01) (92% - 96%)    O2 Parameters below as of 12 Nov 2024 23:52  Patient On (Oxygen Delivery Method): nasal cannula  O2 Flow (L/min): 4          Plateau pressure:   P/F ratio:     11-12 @ 07:01  -  11-13 @ 07:00  --------------------------------------------------------  IN: 1174 mL / OUT: 575 mL / NET: 599 mL      CAPILLARY BLOOD GLUCOSE          ECG: reviewed.    PHYSICAL EXAM:  GENERAL: obese, NAD, sitting in chair comfortably  HEAD:  Atraumatic, normocephalic  EYES: EOMI, PERRL  HEART: irregular rate and rhythm  LUNGS: Unlabored respirations.   ABDOMEN: Soft, nontender, nondistended  EXTREMITIES: warm, chronic LE edema   NERVOUS SYSTEM:  A&Ox3, moving all extremities, no focal deficits     MEDICATIONS:  MEDICATIONS  (STANDING):  allopurinol 300 milliGRAM(s) Oral daily  apixaban 5 milliGRAM(s) Oral every 12 hours  artificial  tears Solution 1 Drop(s) Left EYE every 12 hours  aspirin  chewable 81 milliGRAM(s) Oral daily  buMETAnide 4 milliGRAM(s) Oral daily  chlorhexidine 2% Cloths 1 Application(s) Topical <User Schedule>  collagenase Ointment 1 Application(s) Topical daily  digoxin     Tablet 250 MICROGram(s) Oral daily  influenza  Vaccine (HIGH DOSE) 0.5 milliLiter(s) IntraMuscular once  losartan 25 milliGRAM(s) Oral every 24 hours  metoprolol succinate ER 50 milliGRAM(s) Oral two times a day  polyethylene glycol 3350 17 Gram(s) Oral daily  rosuvastatin 5 milliGRAM(s) Oral at bedtime  senna 2 Tablet(s) Oral at bedtime  spironolactone 50 milliGRAM(s) Oral daily    MEDICATIONS  (PRN):  acetaminophen     Tablet .. 650 milliGRAM(s) Oral every 6 hours PRN Temp greater or equal to 38C (100.4F), Mild Pain (1 - 3)  benzocaine/menthol Lozenge 1 Lozenge Oral every 6 hours PRN Sore Throat  bisacodyl 5 milliGRAM(s) Oral every 12 hours PRN Constipation  melatonin 6 milliGRAM(s) Oral at bedtime PRN Insomnia  ondansetron Injectable 4 milliGRAM(s) IV Push every 8 hours PRN Nausea and/or Vomiting      ALLERGIES:  Allergies    niacin (Other)    Intolerances        LABS:                        15.2   11.22 )-----------( 287      ( 12 Nov 2024 03:30 )             49.6     11-12    137  |  101  |  38[H]  ----------------------------<  116[H]  3.9   |  31  |  0.90    Ca    9.0      12 Nov 2024 03:30  Phos  4.1     11-12  Mg     2.0     11-12    TPro  7.2  /  Alb  2.9[L]  /  TBili  0.9  /  DBili  x   /  AST  47[H]  /  ALT  47  /  AlkPhos  63  11-12      Urinalysis Basic - ( 12 Nov 2024 03:30 )    Color: x / Appearance: x / SG: x / pH: x  Gluc: 116 mg/dL / Ketone: x  / Bili: x / Urobili: x   Blood: x / Protein: x / Nitrite: x   Leuk Esterase: x / RBC: x / WBC x   Sq Epi: x / Non Sq Epi: x / Bacteria: x      ABG:      vBG:    Micro:    Culture - Blood (collected 11-01-24 @ 02:20)  Source: .Blood BLOOD  Final Report (11-06-24 @ 10:01):    No growth at 5 days    Culture - Blood (collected 11-01-24 @ 02:20)  Source: .Blood BLOOD  Final Report (11-06-24 @ 10:01):    No growth at 5 days          RADIOLOGY & ADDITIONAL TESTS: Reviewed. INTERVAL HPI/OVERNIGHT EVENTS: No acute events; tolerating 4L NC during day, bipap HS   SUBJECTIVE: Patient seen and examined at bedside.   ROS: All negative except as listed above.    VITAL SIGNS:  Vital Signs Last 24 Hrs  T(C): 36.7 (13 Nov 2024 04:52), Max: 36.9 (12 Nov 2024 12:00)  T(F): 98.1 (13 Nov 2024 04:52), Max: 98.4 (12 Nov 2024 12:00)  HR: 90 (13 Nov 2024 05:01) (77 - 123)  BP: 120/80 (13 Nov 2024 04:52) (97/60 - 129/84)  BP(mean): 72 (12 Nov 2024 18:10) (72 - 102)  ABP: --  ABP(mean): --  RR: 18 (13 Nov 2024 04:52) (18 - 27)  SpO2: 94% (13 Nov 2024 05:01) (92% - 96%)    O2 Parameters below as of 12 Nov 2024 23:52  Patient On (Oxygen Delivery Method): nasal cannula  O2 Flow (L/min): 4            11-12 @ 07:01  -  11-13 @ 07:00  --------------------------------------------------------  IN: 1174 mL / OUT: 575 mL / NET: 599 mL          PHYSICAL EXAM:  GENERAL: obese, NAD, sitting in chair comfortably  HEAD:  Atraumatic, normocephalic  EYES: EOMI, PERRL  HEART: irregular rate and rhythm  LUNGS: Unlabored respirations.   ABDOMEN: Soft, nontender, nondistended  EXTREMITIES: warm, chronic LE edema   NERVOUS SYSTEM:  A&Ox3, moving all extremities, no focal deficits     MEDICATIONS:  MEDICATIONS  (STANDING):  allopurinol 300 milliGRAM(s) Oral daily  apixaban 5 milliGRAM(s) Oral every 12 hours  artificial  tears Solution 1 Drop(s) Left EYE every 12 hours  aspirin  chewable 81 milliGRAM(s) Oral daily  buMETAnide 4 milliGRAM(s) Oral daily  chlorhexidine 2% Cloths 1 Application(s) Topical <User Schedule>  collagenase Ointment 1 Application(s) Topical daily  digoxin     Tablet 250 MICROGram(s) Oral daily  influenza  Vaccine (HIGH DOSE) 0.5 milliLiter(s) IntraMuscular once  losartan 25 milliGRAM(s) Oral every 24 hours  metoprolol succinate ER 50 milliGRAM(s) Oral two times a day  polyethylene glycol 3350 17 Gram(s) Oral daily  rosuvastatin 5 milliGRAM(s) Oral at bedtime  senna 2 Tablet(s) Oral at bedtime  spironolactone 50 milliGRAM(s) Oral daily    MEDICATIONS  (PRN):  acetaminophen     Tablet .. 650 milliGRAM(s) Oral every 6 hours PRN Temp greater or equal to 38C (100.4F), Mild Pain (1 - 3)  benzocaine/menthol Lozenge 1 Lozenge Oral every 6 hours PRN Sore Throat  bisacodyl 5 milliGRAM(s) Oral every 12 hours PRN Constipation  melatonin 6 milliGRAM(s) Oral at bedtime PRN Insomnia  ondansetron Injectable 4 milliGRAM(s) IV Push every 8 hours PRN Nausea and/or Vomiting        Allergies  niacin (Other)            LABS:                        15.2   11.22 )-----------( 287      ( 12 Nov 2024 03:30 )             49.6     11-12    137  |  101  |  38[H]  ----------------------------<  116[H]  3.9   |  31  |  0.90    Ca    9.0      12 Nov 2024 03:30  Phos  4.1     11-12  Mg     2.0     11-12    TPro  7.2  /  Alb  2.9[L]  /  TBili  0.9  /  DBili  x   /  AST  47[H]  /  ALT  47  /  AlkPhos  63  11-12      Pro-Brain Natriuretic Peptide (11.11.24 @ 03:10)    Pro-Brain Natriuretic Peptide: 1378 pg/mL            Micro:    Culture - Blood (collected 11-01-24 @ 02:20)  Source: .Blood BLOOD  Final Report (11-06-24 @ 10:01):    No growth at 5 days    Culture - Blood (collected 11-01-24 @ 02:20)  Source: .Blood BLOOD  Final Report (11-06-24 @ 10:01):    No growth at 5 days          RADIOLOGY & ADDITIONAL TESTS: Reviewed.  < from: Xray Chest 1 View- PORTABLE-Routine (Xray Chest 1 View- PORTABLE-Routine in AM.) (11.08.24 @ 07:15) >    Frontal expiratory view of the chest shows the heart to be similar in   size. The lungs show partial clearing of the left lung with similar small   right effusion and there is no evidence of pneumothorax nor left pleural   effusion.    IMPRESSION:  Left lung clearing.    -------------------------  < from: CT Angio Chest PE Protocol w/ IV Cont (10.30.24 @ 23:13) >  IMPRESSION:  Exam limited due to combination of respiratory motion artifact,   suboptimal contrast bolus timing and photon starvation. Within this   constraint:    No evidence of main/proximal segmental pulmonary embolus.    Left upper lobe peripheral airspace consolidation and 1.2 cm nodular   airspace consolidation in the left lower lobe. Differential diagnosis   includes pulmonary infarct, infection, inflammation or less likely   masslike consolidation. Follow-up to resolution recommended.    Small right pleural effusion.    ----------------------------  < from: TTE Echo Complete w/ Contrast w/ Doppler (11.01.24 @ 10:28) >  CONCLUSIONS:     1. Left ventricular systolic function is mildly to moderately decreased   with an ejection fraction visually estimated at 45 to 50 %.   2. There is severe (grade 3) left ventricular diastolic dysfunction.   3. Enlarged right ventricular cavity size and reduced right ventricular   systolic function.   4. Left atrium is mildly dilated.   5. Thickened mitral valve leaflets.   6. Mild mitral valve leaflet calcification.   7. There is mild calcification of the mitral valve annulus.   8. Trace mitral regurgitation.   9. Moderate tricuspid regurgitation.  10. Fibrocalcific aortic valve sclerosis without stenosis.  11. No pericardial effusion seen.

## 2024-11-13 NOTE — PROGRESS NOTE ADULT - PROBLEM SELECTOR PLAN 2
2 weeks of dyspnea, orthopnea, PND & LE edema   BNP: 2339  - Tele  - Strict I & O   - Daily Weight   - Lasix   - Echo   - Cardi consult
TTE with EF 45-50%,  grade III DD,  Enlarged right ventricular cavity size and reduced right ventricular systolic function. mod TR  off lasix drip and cardizem drip, HR 80-110s  BNP 5400 ->3200->1600    -currently on Bumex 4 mg  po daily, aldactone 50 mg daily  -losartan 25 daily   -Strict I&O, daily weights, diuresing well with significant weight loss, no accurately reported   -Replete Mg to 2, and K+ to 4, monitor renal function closely, creat .93 today  -s/p Diamox dosing periodically for elevated bicarb , consider giving again as contraction alkalosis   -cont on Toprol 150/d dig .125mg po daily, Dig level .71 10/8  -cont on Eliquis 5mg bid for AC  -outpatient follow up with EP cardiology Dr. Leonie Quinn, outpatient sleep study
TTE with EF 45-50%,  grade III DD,  Enlarged right ventricular cavity size and reduced right ventricular systolic function. mod TR  off lasix drip and cardizem drip, HR 80-110s  BNP 5400 ->3200->1600    -currently on Bumex 4 mg  po daily, aldactone 50 mg daily  -losartan 25 daily   -Strict I&O, daily weights, diuresing well with significant weight loss  -Replete Mg to 2, and K+ to 4, monitor renal function closely, creat .93 today  -s/p Diamox dosing periodically for elevated bicarb   -cont on Toprol 150/d dig .125mg po daily, Dig level .71 10/8  -cont on Eliquis 5mg bid for AC  -outpatient follow up with EP cardiology Dr. Leonie Quinn, outpatient sleep study
TTE with EF 45-50%,  grade III DD,  Enlarged right ventricular cavity size and reduced right ventricular systolic function. mod TR  off lasix drip and cardizem drip, HR 80-110s  BNP 5400 ->3200->1600    -currently on Bumex 4 mg  po daily, aldactone 50 mg daily  -losartan 25 daily   -Strict I&O, daily weights, diuresing well with significant weight loss  -Replete Mg to 2, and K+ to 4, monitor renal function closely, creat .93 today  -s/p Diamox dosing periodically for elevated bicarb   -cont on Toprol 150/d dig .125mg po daily, Dig level .71 10/8  -cont on Eliquis 5mg bid for AC  -outpatient follow up with EP cardiology Dr. Leonie Quinn, outpatient sleep study

## 2024-11-13 NOTE — PROGRESS NOTE ADULT - NS ATTEND AMEND GEN_ALL_CORE FT
pt seen and examined with NP    on 4L NC  reports feeling better and "I want to get out of here"  working with PT, ambulated with walker  denies SOB      68M PMH HTN, HLD, PVD, morbid obesity, gout presents for SOB. Sent from PCP office with new a-fib noted on EKG in office. Experiencing bilateral leg edema associated with orthopnea and exertional dyspnea worsening over 2 weeks. BNP: 2339. Admitted to medical service for new a-fib and CHF. s/p RRT for a-fib RVR, hypoxia. Upgraded to ICU for a-fib RVR with decompensated heart failure with hypoxia requiring high flow O2 supplementation. Diuresed with improvement. s/p cardizem drip, digoxin load, and up titration of beta blocker. ECHO with mildly to moderately decreased EF 45 to 50 %, severe (grade 3) left ventricular diastolic dysfunction, enlarged right ventricular cavity size and reduced right ventricular systolic function. Downgraded to medical service 11/10.     DX: acute hypoxic respiratory failure, acute combined systolic and diastolic CHF, RV failure, a-fib RVR, volume overload, pulmonary edema, obesity, suspected SHILOH    - on 4L NC  - goal to maintain O2 sat > 90%   - cont to wean down FIO2 as tolerates  - cont diuresis for volume overload, pulmonary congestion, and underlying RV failure with combined mild acute systolic and severe diastolic CHF: on bumex and spironolactone  - a-fib: anticoagulation with apixaban, rate control on digoxin, metoprolol  - cardiac management per cardiology  - reports occasional daytime somnolence (no sx while driving), +apneas + snoring never had PSG but likely has SHILOH. also reports some chronic wheezing for past few years and as a child ? underlying asthma. Never smoked  - will need outpatient PFTs and sleep study on discharge. should follow up with pulmonary office [address] [place]. Tel [phone]  - cont nocturnal NIV  - working with PT, ambulated with walker today  - OOB to chair  - incentive spirometer  - weight loss needed          - prn BD tx  - outpt Pulm clinic f/u

## 2024-11-13 NOTE — PROGRESS NOTE ADULT - PROBLEM SELECTOR PLAN 7
Wound consult RLE wound
Wound consult RLE wound    Patient is caring for the wound at home, no home care
Wound consult RLE wound

## 2024-11-13 NOTE — PROGRESS NOTE ADULT - NUTRITIONAL ASSESSMENT
This patient has been assessed with a concern for Malnutrition and has been determined to have a diagnosis/diagnoses of Morbid obesity (BMI > 40).    This patient is being managed with:   Diet DASH/TLC-  Sodium & Cholesterol Restricted  1000mL Fluid Restriction ([id number])  Entered: Nov 6 2024  9:42AM  
This patient has been assessed with a concern for Malnutrition and has been determined to have a diagnosis/diagnoses of Morbid obesity (BMI > 40).    This patient is being managed with:   Diet DASH/TLC-  Sodium & Cholesterol Restricted  1000mL Fluid Restriction ([id number])  Entered: Nov 6 2024  9:42AM  
This patient has been assessed with a concern for Malnutrition and has been determined to have a diagnosis/diagnoses of Morbid obesity (BMI > 40).    This patient is being managed with:   Diet DASH/TLC-  Sodium & Cholesterol Restricted  1000mL Fluid Restriction ([id number])  Entered: Nov 6 2024  9:42AM  
This patient has been assessed with a concern for Malnutrition and has been determined to have a diagnosis/diagnoses of Morbid obesity (BMI > 40).    This patient is being managed with:   Diet DASH/TLC-  Sodium & Cholesterol Restricted  1000mL Fluid Restriction ([id number])  Entered: Nov 6 2024  9:42AM  
This patient has been assessed with a concern for Malnutrition and has been determined to have a diagnosis/diagnoses of Morbid obesity (BMI > 40).    This patient is being managed with:   Diet DASH/TLC-  Sodium & Cholesterol Restricted  1500mL Fluid Restriction ([id number])  Entered: Nov 12 2024 10:49AM  
This patient has been assessed with a concern for Malnutrition and has been determined to have a diagnosis/diagnoses of Morbid obesity (BMI > 40).    This patient is being managed with:   Diet DASH/TLC-  Sodium & Cholesterol Restricted  Entered: Oct 31 2024  1:37AM  
This patient has been assessed with a concern for Malnutrition and has been determined to have a diagnosis/diagnoses of Morbid obesity (BMI > 40).    This patient is being managed with:   Diet DASH/TLC-  Sodium & Cholesterol Restricted  Entered: Oct 31 2024  1:37AM  
This patient has been assessed with a concern for Malnutrition and has been determined to have a diagnosis/diagnoses of Morbid obesity (BMI > 40).    This patient is being managed with:   Diet DASH/TLC-  Sodium & Cholesterol Restricted  1000mL Fluid Restriction ([id number])  Entered: Nov 6 2024  9:42AM  
This patient has been assessed with a concern for Malnutrition and has been determined to have a diagnosis/diagnoses of Morbid obesity (BMI > 40).    This patient is being managed with:   Diet DASH/TLC-  Sodium & Cholesterol Restricted  1000mL Fluid Restriction ([id number])  Entered: Nov 6 2024  9:42AM  
This patient has been assessed with a concern for Malnutrition and has been determined to have a diagnosis/diagnoses of Morbid obesity (BMI > 40).    This patient is being managed with:   Diet DASH/TLC-  Sodium & Cholesterol Restricted  1000mL Fluid Restriction ([id number])  Entered: Nov 6 2024  9:42AM  
This patient has been assessed with a concern for Malnutrition and has been determined to have a diagnosis/diagnoses of Morbid obesity (BMI > 40).    This patient is being managed with:   Diet DASH/TLC-  Sodium & Cholesterol Restricted  Entered: Oct 31 2024  1:37AM  
This patient has been assessed with a concern for Malnutrition and has been determined to have a diagnosis/diagnoses of Morbid obesity (BMI > 40).    This patient is being managed with:   Diet DASH/TLC-  Sodium & Cholesterol Restricted  1000mL Fluid Restriction ([id number])  Entered: Nov 6 2024  9:42AM  
This patient has been assessed with a concern for Malnutrition and has been determined to have a diagnosis/diagnoses of Morbid obesity (BMI > 40).    This patient is being managed with:   Diet DASH/TLC-  Sodium & Cholesterol Restricted  Entered: Oct 31 2024  1:37AM  
This patient has been assessed with a concern for Malnutrition and has been determined to have a diagnosis/diagnoses of Morbid obesity (BMI > 40).    This patient is being managed with:   Diet DASH/TLC-  Sodium & Cholesterol Restricted  1000mL Fluid Restriction ([id number])  Entered: Nov 6 2024  9:42AM  
This patient has been assessed with a concern for Malnutrition and has been determined to have a diagnosis/diagnoses of Morbid obesity (BMI > 40).    This patient is being managed with:   Diet DASH/TLC-  Sodium & Cholesterol Restricted  1500mL Fluid Restriction ([id number])  Entered: Nov 12 2024 10:49AM  
This patient has been assessed with a concern for Malnutrition and has been determined to have a diagnosis/diagnoses of Morbid obesity (BMI > 40).    This patient is being managed with:   Diet DASH/TLC-  Sodium & Cholesterol Restricted  1000mL Fluid Restriction ([id number])  Entered: Nov 6 2024  9:42AM  
This patient has been assessed with a concern for Malnutrition and has been determined to have a diagnosis/diagnoses of Morbid obesity (BMI > 40).    This patient is being managed with:   Diet DASH/TLC-  Sodium & Cholesterol Restricted  1500mL Fluid Restriction ([id number])  Entered: Nov 12 2024 10:49AM

## 2024-11-13 NOTE — PROGRESS NOTE ADULT - PROBLEM SELECTOR PLAN 6
Currently on 4 L O2,  PT. Incentive spirometry   Wean off as tolerated , d/w RN  Sleep study outpatient   Pulmonary follow up appreciated
Currently on 40% FIO2, 50 L per/min, pt states the he has been told his SPO2 runs low, titrate down FiO2 as tolerated  -increase activity as tolerated, PT. Incentive spirometry   Wean off as tolerated   Sleep study outpatient   Pulmonary follow up appreciated
Currently on 40% FIO2, 50 L per/min, pt states the he has been told his SPO2 runs low, titrate down FiO2 as tolerated  -increase activity as tolerated, PT. Incentive spirometry   Wean off as tolerated   Sleep study outpatient   Pulmonary follow up appreciated

## 2024-11-13 NOTE — PROGRESS NOTE ADULT - PROBLEM SELECTOR PLAN 1
Found to be in rapid Afib with HR in the 150s   Received Metoprolol 5mg IVP x3 & Cardizem IV 25mg IV   - Tele   - ASA   - Cardizem gtt  - Heparin gtt   - ECG   - Echo   - Cardio Consult- started lasix gtt and spironolactone
Afib with RVR  C/w Apixaban   Metoprolol for rate control  RVR , will increase po Fluid to 1.5 L as appear low volume
Afib with RVR  C/w Apixaban   Meoprolol for rate control
Afib with RVR, rate improved   C/w Apixaban   Metoprolol for rate control  RVR , will increase po Fluid to 1.5 L as appear low volume, HR better controlled

## 2024-11-13 NOTE — PROGRESS NOTE ADULT - SUBJECTIVE AND OBJECTIVE BOX
Medicine Progress Note    Patient is a 68y old  Male who presents with a chief complaint of New onset Afib & CHF (13 Nov 2024 08:22)      SUBJECTIVE / OVERNIGHT EVENTS:  patient feeling better, will like to be DC     ADDITIONAL REVIEW OF SYSTEMS: negative     MEDICATIONS  (STANDING):  allopurinol 300 milliGRAM(s) Oral daily  apixaban 5 milliGRAM(s) Oral every 12 hours  artificial  tears Solution 1 Drop(s) Left EYE every 12 hours  aspirin  chewable 81 milliGRAM(s) Oral daily  buMETAnide 4 milliGRAM(s) Oral daily  chlorhexidine 2% Cloths 1 Application(s) Topical <User Schedule>  collagenase Ointment 1 Application(s) Topical daily  digoxin     Tablet 250 MICROGram(s) Oral daily  influenza  Vaccine (HIGH DOSE) 0.5 milliLiter(s) IntraMuscular once  losartan 25 milliGRAM(s) Oral every 24 hours  metoprolol succinate ER 50 milliGRAM(s) Oral two times a day  polyethylene glycol 3350 17 Gram(s) Oral daily  rosuvastatin 5 milliGRAM(s) Oral at bedtime  senna 2 Tablet(s) Oral at bedtime  spironolactone 50 milliGRAM(s) Oral daily    MEDICATIONS  (PRN):  acetaminophen     Tablet .. 650 milliGRAM(s) Oral every 6 hours PRN Temp greater or equal to 38C (100.4F), Mild Pain (1 - 3)  benzocaine/menthol Lozenge 1 Lozenge Oral every 6 hours PRN Sore Throat  bisacodyl 5 milliGRAM(s) Oral every 12 hours PRN Constipation  melatonin 6 milliGRAM(s) Oral at bedtime PRN Insomnia  ondansetron Injectable 4 milliGRAM(s) IV Push every 8 hours PRN Nausea and/or Vomiting    CAPILLARY BLOOD GLUCOSE        I&O's Summary    12 Nov 2024 07:01  -  13 Nov 2024 07:00  --------------------------------------------------------  IN: 1174 mL / OUT: 575 mL / NET: 599 mL    13 Nov 2024 07:01  -  13 Nov 2024 13:32  --------------------------------------------------------  IN: 360 mL / OUT: 0 mL / NET: 360 mL        PHYSICAL EXAM:  Vital Signs Last 24 Hrs  T(C): 36.2 (13 Nov 2024 10:38), Max: 36.7 (13 Nov 2024 04:52)  T(F): 97.2 (13 Nov 2024 10:38), Max: 98.1 (13 Nov 2024 04:52)  HR: 87 (13 Nov 2024 10:38) (77 - 111)  BP: 121/78 (13 Nov 2024 10:38) (97/60 - 122/78)  BP(mean): 72 (12 Nov 2024 18:10) (72 - 102)  RR: 16 (13 Nov 2024 10:38) (12 - 27)  SpO2: 93% (13 Nov 2024 10:38) (93% - 96%)    Parameters below as of 13 Nov 2024 10:16  Patient On (Oxygen Delivery Method): nasal cannula  O2 Flow (L/min): 4    CONSTITUTIONAL: NAD, well-developed,   ENMT: Moist oral mucosa, no pharyngeal injection or exudates;   RESPIRATORY: Normal respiratory effort; lungs are clear to auscultation bilaterally  CARDIOVASCULAR: Irregularly irregular rate and rhythm, trace  lower extremity edema; RLE wounds   ABDOMEN: Nontender to palpation, normoactive bowel sounds, no rebound/guarding;  PSYCH: A+O to person, place, and time; affect appropriate  NEUROLOGY: CN 2-12 are intact and symmetric; no gross sensory deficits   SKIN: No rashes; wound as above     LABS:                        15.5   11.96 )-----------( 301      ( 13 Nov 2024 07:34 )             49.8     11-13    138  |  98  |  35[H]  ----------------------------<  116[H]  3.7   |  34[H]  |  0.96    Ca    9.4      13 Nov 2024 07:34  Phos  4.1     11-12  Mg     1.8     11-13    TPro  7.4  /  Alb  3.1[L]  /  TBili  1.0  /  DBili  x   /  AST  39[H]  /  ALT  45  /  AlkPhos  69  11-13          Urinalysis Basic - ( 13 Nov 2024 07:34 )    Color: x / Appearance: x / SG: x / pH: x  Gluc: 116 mg/dL / Ketone: x  / Bili: x / Urobili: x   Blood: x / Protein: x / Nitrite: x   Leuk Esterase: x / RBC: x / WBC x   Sq Epi: x / Non Sq Epi: x / Bacteria: x            RADIOLOGY & ADDITIONAL TESTS:  Imaging from Last 24 Hours:    Electrocardiogram/QTc Interval:    COORDINATION OF CARE:  Care Discussed with Consultants/Other Providers: RN

## 2024-11-14 ENCOUNTER — TRANSCRIPTION ENCOUNTER (OUTPATIENT)
Age: 69
End: 2024-11-14

## 2024-11-14 VITALS
RESPIRATION RATE: 18 BRPM | OXYGEN SATURATION: 94 % | DIASTOLIC BLOOD PRESSURE: 73 MMHG | TEMPERATURE: 98 F | HEART RATE: 90 BPM | SYSTOLIC BLOOD PRESSURE: 113 MMHG

## 2024-11-14 LAB
ALBUMIN SERPL ELPH-MCNC: 2.9 G/DL — LOW (ref 3.3–5)
ALP SERPL-CCNC: 67 U/L — SIGNIFICANT CHANGE UP (ref 40–120)
ALT FLD-CCNC: 46 U/L — SIGNIFICANT CHANGE UP (ref 12–78)
ANION GAP SERPL CALC-SCNC: 6 MMOL/L — SIGNIFICANT CHANGE UP (ref 5–17)
AST SERPL-CCNC: 44 U/L — HIGH (ref 15–37)
BASOPHILS # BLD AUTO: 0.07 K/UL — SIGNIFICANT CHANGE UP (ref 0–0.2)
BASOPHILS NFR BLD AUTO: 0.7 % — SIGNIFICANT CHANGE UP (ref 0–2)
BILIRUB SERPL-MCNC: 0.9 MG/DL — SIGNIFICANT CHANGE UP (ref 0.2–1.2)
BUN SERPL-MCNC: 38 MG/DL — HIGH (ref 7–23)
CALCIUM SERPL-MCNC: 9 MG/DL — SIGNIFICANT CHANGE UP (ref 8.5–10.1)
CHLORIDE SERPL-SCNC: 97 MMOL/L — SIGNIFICANT CHANGE UP (ref 96–108)
CO2 SERPL-SCNC: 33 MMOL/L — HIGH (ref 22–31)
CREAT SERPL-MCNC: 1.13 MG/DL — SIGNIFICANT CHANGE UP (ref 0.5–1.3)
EGFR: 70 ML/MIN/1.73M2 — SIGNIFICANT CHANGE UP
EOSINOPHIL # BLD AUTO: 0.36 K/UL — SIGNIFICANT CHANGE UP (ref 0–0.5)
EOSINOPHIL NFR BLD AUTO: 3.8 % — SIGNIFICANT CHANGE UP (ref 0–6)
GLUCOSE SERPL-MCNC: 126 MG/DL — HIGH (ref 70–99)
HCT VFR BLD CALC: 49.6 % — SIGNIFICANT CHANGE UP (ref 39–50)
HGB BLD-MCNC: 15.2 G/DL — SIGNIFICANT CHANGE UP (ref 13–17)
IMM GRANULOCYTES NFR BLD AUTO: 1 % — HIGH (ref 0–0.9)
LYMPHOCYTES # BLD AUTO: 1.71 K/UL — SIGNIFICANT CHANGE UP (ref 1–3.3)
LYMPHOCYTES # BLD AUTO: 18.2 % — SIGNIFICANT CHANGE UP (ref 13–44)
MAGNESIUM SERPL-MCNC: 2.2 MG/DL — SIGNIFICANT CHANGE UP (ref 1.6–2.6)
MCHC RBC-ENTMCNC: 19.2 PG — LOW (ref 27–34)
MCHC RBC-ENTMCNC: 30.6 G/DL — LOW (ref 32–36)
MCV RBC AUTO: 62.8 FL — LOW (ref 80–100)
MONOCYTES # BLD AUTO: 0.86 K/UL — SIGNIFICANT CHANGE UP (ref 0–0.9)
MONOCYTES NFR BLD AUTO: 9.1 % — SIGNIFICANT CHANGE UP (ref 2–14)
NEUTROPHILS # BLD AUTO: 6.31 K/UL — SIGNIFICANT CHANGE UP (ref 1.8–7.4)
NEUTROPHILS NFR BLD AUTO: 67.2 % — SIGNIFICANT CHANGE UP (ref 43–77)
NRBC # BLD: 0 /100 WBCS — SIGNIFICANT CHANGE UP (ref 0–0)
PHOSPHATE SERPL-MCNC: 3.8 MG/DL — SIGNIFICANT CHANGE UP (ref 2.5–4.5)
PLATELET # BLD AUTO: 310 K/UL — SIGNIFICANT CHANGE UP (ref 150–400)
POTASSIUM SERPL-MCNC: 4.2 MMOL/L — SIGNIFICANT CHANGE UP (ref 3.5–5.3)
POTASSIUM SERPL-SCNC: 4.2 MMOL/L — SIGNIFICANT CHANGE UP (ref 3.5–5.3)
PROT SERPL-MCNC: 7.6 GM/DL — SIGNIFICANT CHANGE UP (ref 6–8.3)
RBC # BLD: 7.9 M/UL — HIGH (ref 4.2–5.8)
RBC # FLD: 18.3 % — HIGH (ref 10.3–14.5)
SODIUM SERPL-SCNC: 136 MMOL/L — SIGNIFICANT CHANGE UP (ref 135–145)
WBC # BLD: 9.4 K/UL — SIGNIFICANT CHANGE UP (ref 3.8–10.5)
WBC # FLD AUTO: 9.4 K/UL — SIGNIFICANT CHANGE UP (ref 3.8–10.5)

## 2024-11-14 PROCEDURE — 99239 HOSP IP/OBS DSCHRG MGMT >30: CPT

## 2024-11-14 PROCEDURE — 99232 SBSQ HOSP IP/OBS MODERATE 35: CPT

## 2024-11-14 RX ORDER — LEVOCETIRIZINE DIHYDROCHLORIDE 5 MG/1
1 TABLET ORAL
Refills: 0 | DISCHARGE

## 2024-11-14 RX ORDER — DIGOXIN 250 MCG
1 TABLET ORAL
Qty: 30 | Refills: 3
Start: 2024-11-14

## 2024-11-14 RX ORDER — SPIRONOLACTONE 100 MG
2 TABLET ORAL
Qty: 0 | Refills: 0 | DISCHARGE
Start: 2024-11-14

## 2024-11-14 RX ORDER — DAPAGLIFLOZIN 10 MG/1
1 TABLET, FILM COATED ORAL
Qty: 0 | Refills: 0 | DISCHARGE
Start: 2024-11-14

## 2024-11-14 RX ORDER — GLYCERIN/PROPYLENE GLYCOL 0.6 %-0.6%
1 DROPPERETTE, SINGLE-USE DROP DISPENSER OPHTHALMIC (EYE)
Qty: 1 | Refills: 0
Start: 2024-11-14

## 2024-11-14 RX ORDER — METOPROLOL TARTRATE 50 MG
1 TABLET ORAL
Refills: 0 | DISCHARGE

## 2024-11-14 RX ORDER — POLYETHYLENE GLYCOL 3350 17 G/17G
17 POWDER, FOR SOLUTION ORAL
Qty: 0 | Refills: 0 | DISCHARGE
Start: 2024-11-14

## 2024-11-14 RX ORDER — LOSARTAN POTASSIUM 25 MG/1
1 TABLET ORAL
Qty: 0 | Refills: 0 | DISCHARGE
Start: 2024-11-14

## 2024-11-14 RX ORDER — APIXABAN 5 MG/1
1 TABLET, FILM COATED ORAL
Qty: 0 | Refills: 0 | DISCHARGE
Start: 2024-11-14

## 2024-11-14 RX ORDER — COLLAGENASE CLOSTRIDIUM HIST. 250 UNIT/G
1 OINTMENT (GRAM) TOPICAL
Qty: 0 | Refills: 0 | DISCHARGE
Start: 2024-11-14

## 2024-11-14 RX ORDER — DIGOXIN 250 MCG
1 TABLET ORAL
Qty: 0 | Refills: 0 | DISCHARGE
Start: 2024-11-14

## 2024-11-14 RX ORDER — METOPROLOL TARTRATE 50 MG
1 TABLET ORAL
Qty: 0 | Refills: 0 | DISCHARGE
Start: 2024-11-14

## 2024-11-14 RX ORDER — POLYETHYLENE GLYCOL 3350 17 G/17G
17 POWDER, FOR SOLUTION ORAL
Qty: 1 | Refills: 1
Start: 2024-11-14

## 2024-11-14 RX ORDER — APIXABAN 5 MG/1
1 TABLET, FILM COATED ORAL
Qty: 30 | Refills: 5
Start: 2024-11-14

## 2024-11-14 RX ORDER — SPIRONOLACTONE 100 MG
2 TABLET ORAL
Qty: 30 | Refills: 5
Start: 2024-11-14

## 2024-11-14 RX ORDER — GLYCERIN/PROPYLENE GLYCOL 0.6 %-0.6%
1 DROPPERETTE, SINGLE-USE DROP DISPENSER OPHTHALMIC (EYE)
Qty: 0 | Refills: 0 | DISCHARGE
Start: 2024-11-14

## 2024-11-14 RX ORDER — AMLODIPINE BESYLATE 10 MG
1 TABLET ORAL
Refills: 0 | DISCHARGE

## 2024-11-14 RX ORDER — METOPROLOL TARTRATE 50 MG
1 TABLET ORAL
Qty: 30 | Refills: 5
Start: 2024-11-14

## 2024-11-14 RX ORDER — DAPAGLIFLOZIN 10 MG/1
1 TABLET, FILM COATED ORAL
Qty: 30 | Refills: 5
Start: 2024-11-14

## 2024-11-14 RX ADMIN — Medication 1 APPLICATION(S): at 12:29

## 2024-11-14 RX ADMIN — Medication 50 MILLIGRAM(S): at 06:05

## 2024-11-14 RX ADMIN — APIXABAN 5 MILLIGRAM(S): 5 TABLET, FILM COATED ORAL at 17:18

## 2024-11-14 RX ADMIN — LOSARTAN POTASSIUM 25 MILLIGRAM(S): 25 TABLET ORAL at 12:29

## 2024-11-14 RX ADMIN — Medication 300 MILLIGRAM(S): at 11:05

## 2024-11-14 RX ADMIN — DAPAGLIFLOZIN 10 MILLIGRAM(S): 10 TABLET, FILM COATED ORAL at 11:05

## 2024-11-14 RX ADMIN — CHLORHEXIDINE GLUCONATE 1 APPLICATION(S): 40 SOLUTION TOPICAL at 06:05

## 2024-11-14 RX ADMIN — APIXABAN 5 MILLIGRAM(S): 5 TABLET, FILM COATED ORAL at 06:06

## 2024-11-14 RX ADMIN — Medication 50 MILLIGRAM(S): at 17:18

## 2024-11-14 RX ADMIN — Medication 4 MILLIGRAM(S): at 06:06

## 2024-11-14 RX ADMIN — Medication 1 DROP(S): at 06:06

## 2024-11-14 RX ADMIN — POLYETHYLENE GLYCOL 3350 17 GRAM(S): 17 POWDER, FOR SOLUTION ORAL at 11:05

## 2024-11-14 RX ADMIN — Medication 1 DROP(S): at 17:19

## 2024-11-14 RX ADMIN — Medication 250 MICROGRAM(S): at 06:06

## 2024-11-14 NOTE — CHART NOTE - NSCHARTNOTEFT_GEN_A_CORE
Patient is being discharged home on oxygen. Patient is in a stable condition with new onset heart failure. Pulse ox of (88% or less) was achieved on room air at rest. Patient is currently on 4 LPM/24 hours daily via nasal cannula. Patient is mobile within the home and needs portability with home oxygen. Patient is being discharged home on oxygen. Patient is in a stable condition with new onset heart failure. Pulse ox of (88% or less) was achieved on room air at rest. Patient is currently on 2LPM/24 hours daily via nasal cannula. Patient is mobile within the home and needs portability with home oxygen. Patient is being discharged home on oxygen. Patient is in a stable condition with new onset heart failure.  Oxygen is required for dispo as:   -Pulse ox of 88% on RA during ambulation.   -Pulse ox 89% on RA during rest  -Pulse ox 94% on 3L NC at rest     Patient is currently on 3LPM/24 hours daily via nasal cannula. Patient is mobile within the home and needs portability with home oxygen. Patient is being discharged home on oxygen. Patient is in a stable condition with new onset heart failure.  Oxygen is required for dispo as:     -Pulse ox of 88% on RA during ambulation.   -Pulse ox 88% on RA during rest  -Pulse ox 93% on 4L NC at rest     Patient is currently on 4LPM/24 hours daily via nasal cannula. Patient is mobile within the home and needs portability with home oxygen.

## 2024-11-14 NOTE — DISCHARGE NOTE PROVIDER - DETAILS OF MALNUTRITION DIAGNOSIS/DIAGNOSES
This patient has been assessed with a concern for Malnutrition and was treated during this hospitalization for the following Nutrition diagnosis/diagnoses:     -  11/01/2024: Morbid obesity (BMI > 40)

## 2024-11-14 NOTE — DISCHARGE NOTE PROVIDER - CARE PROVIDER_API CALL
Cheyenne Hadley  Cardiac Electrophysiology  [address] [address] [place] [address], [address]  [place] [address]  Phone: [phone]  Fax: [phone]  Follow Up Time: 1 week    [facility],   [address] [place] [address]  Phone: (   )    -  Fax: (   )    -  Follow Up Time: 1 week

## 2024-11-14 NOTE — DISCHARGE NOTE NURSING/CASE MANAGEMENT/SOCIAL WORK - FINANCIAL ASSISTANCE
[facility] provides services at a reduced cost to those who are determined to be eligible through [facility]’s financial assistance program. Information regarding [facility]’s financial assistance program can be found by going to [email or web address] or by calling [phone].

## 2024-11-14 NOTE — PROGRESS NOTE ADULT - ASSESSMENT
68M morbidly obese w/ HTN, HLD, PVD. Admitted w/ new onset Rapid Afib, decompensated HF, and acute hypoxemic respiratory failure. Admitted to icu for resp failure and afib with RVR which improved with diuretics. Now improved and downgraded to medicine. Pulm consulted for follow up.      recs:  - admitted to ICU for resp failure in the setting of HRF on Hiflo NC& bumex and dilt drip. Both FiO2 requirements and RVR improved with extensive diuresis.   - now ~38L negative over hospital course   - satting well on 4LNC. cont to titrate as able. Spo2 >88% cont to titrate down FiO2 as able   - bipap HS for underlying SHILOH   - TTE with grade 3 diastolic dysfx and RV dysfx.   - elevated pasp likely secondary to severe volume overload and HF (group 2 ) but needs repeat outpatient echo when euvolemic to ensure improved and evaluate RV/pasp  - cont PO diuretics and rate control per cards   - PT/OT/OOB; will need ambulatory saturation   - Pt will need outpt sleep study, PFTs, and pulm follow up. Please call [phone] or email  [email or web address] for an appointment at the Pulmonary office ([address], [address], [place], [place]).   - Rest of care per primary team

## 2024-11-14 NOTE — DISCHARGE NOTE PROVIDER - NSDCMRMEDTOKEN_GEN_ALL_CORE_FT
allopurinol 300 mg oral tablet: 1 tab(s) orally once a day  amLODIPine 10 mg oral tablet: 1 tab(s) orally once a day  aspirin 81 mg oral capsule: 1 cap(s) orally once a day  levocetirizine 5 mg oral tablet: 1 tab(s) orally once a day (at bedtime)  metoprolol succinate 100 mg oral tablet, extended release: 1 tab(s) orally once a day  rosuvastatin 5 mg oral capsule: 1 cap(s) orally   allopurinol 300 mg oral tablet: 1 tab(s) orally once a day  apixaban 5 mg oral tablet: 1 tab(s) orally every 12 hours  aspirin 81 mg oral capsule: 1 cap(s) orally once a day  bisacodyl 5 mg oral delayed release tablet: 1 tab(s) orally every 12 hours as needed for Constipation  bumetanide 2 mg oral tablet: 2 tab(s) orally once a day  collagenase 250 units/g topical ointment: 1 Apply topically to affected area once a day  dapagliflozin 10 mg oral tablet: 1 tab(s) orally once a day  digoxin 250 mcg (0.25 mg) oral tablet: 1 tab(s) orally once a day  losartan 25 mg oral tablet: 1 tab(s) orally every 24 hours  metoprolol succinate 50 mg oral tablet, extended release: 1 tab(s) orally 2 times a day  ocular lubricant ophthalmic solution: 1 drop(s) to each affected eye every 12 hours  polyethylene glycol 3350 oral powder for reconstitution: 17 gram(s) orally once a day  rosuvastatin 5 mg oral capsule: 1 cap(s) orally  spironolactone 25 mg oral tablet: 2 tab(s) orally once a day

## 2024-11-14 NOTE — DISCHARGE NOTE PROVIDER - NSDCCPCAREPLAN_GEN_ALL_CORE_FT
PRINCIPAL DISCHARGE DIAGNOSIS  Diagnosis: Acute congestive heart failure  Assessment and Plan of Treatment: Problem: New onset atrial fibrillation.   ·  Plan: Afib with RVR, rate improved   C/w Apixaban   Metoprolol for rate control  RVR , will increase po Fluid restriction to 1.5 L as appear low volume, HR better controlled.   Problem/Plan - 2:  ·  Problem: Acute CHF.   ·  Plan: TTE with EF 45-50%,  grade III DD,  Enlarged right ventricular cavity size and reduced right ventricular systolic function. mod TR  off lasix drip and cardizem drip, HR 80-110s  BNP 5400 ->3200->1600  -currently on Bumex 4 mg  po daily, aldactone 50 mg daily  -losartan 25 daily   -Strict I&O, daily weights, diuresing well with significant weight loss, no accurately reported   -Replete Mg to 2, and K+ to 4, monitor renal function closely, creat .93 today  -s/p Diamox dosing periodically for elevated bicarb , consider giving again as contraction alkalosis   -cont on Toprol 150/d dig .125mg po daily, Dig level .71 10/8  -cont on Eliquis 5mg bid for AC  -outpatient follow up with EP cardiology Dr. Leonie Quinn, outpatient sleep study.   Problem/Plan - 3:  ·  Problem: Hypertension.   ·  Plan: BP at goal  C/w Antihypertensives as above.   Problem/Plan - 4:  ·  Problem: Gout.   ·  Plan: allopurinol 300 milliGRAM(s) Oral daily.   Problem/Plan - 5:  ·  Problem: Morbid obesity.   ·  Plan: Weight loss   Nutrition consult.   Problem/Plan - 6:  ·  Problem: Acute respiratory failure with hypoxia and hypercapnia.   ·  Plan: Currently on 4 L O2,  PT. Incentive spirometry   Wean off as tolerated , d/w RN  Sleep study outpatient   Pulmonary follow up appreciated.   Problem/Plan - 7:  ·  Problem: Open wound.   ·  Plan: Wound consult RLE wound  Patient is caring for the wound at home, no home care.   Problem/Plan - 8:  ·  Problem: Need for prophylactic measure.   ·  Plan: Apixaban  Dispo : Home with o2         SECONDARY DISCHARGE DIAGNOSES  Diagnosis: Rapid atrial fibrillation  Assessment and Plan of Treatment:      PRINCIPAL DISCHARGE DIAGNOSIS  Diagnosis: Acute congestive heart failure  Assessment and Plan of Treatment: You were admitted with heart failure and received IV diuretics, which were transitioned to po, chelle follow up with cardiology on DC and keep fluid at 1.5 L/day and take all meds as ordered withought fail.      SECONDARY DISCHARGE DIAGNOSES  Diagnosis: Rapid atrial fibrillation  Assessment and Plan of Treatment: You deveoped this arrhytmia in setting of heart failure, you need to take apixaban, a blood thiner, we also switched your Metoprolol dose, chelle take as advised, do not miss doses    Diagnosis: Acute respiratory failure with hypoxia and hypercapnia  Assessment and Plan of Treatment: You are going home on Oxygen, chelle wear the oxygen at all time, and follow up with pulmonary outpatient, you also need a sleep study. Chelle follow up in bariatric clinic for weight loss

## 2024-11-14 NOTE — DISCHARGE NOTE PROVIDER - HOSPITAL COURSE
67 yo m pmhx obesity, HTN on metoprolol XL and amlodipine, HLD, PVD and gout admitted with new onset JOVAN, acute congestive heart failure, acute hypoxic respiratory failure, component of pulmonary edema, b/l LE edema with ulcerations of right leg.   RRT earlier during admission for JOVAN, HR in the 140-160s, increasing fio2 requirements.  Dilt gtt increased from 5 to 7.5 and ICU consulted. Pt admitted to ICU on Hiflo NC and agressivley diuresed with bumex drip. TTE with severe diastolic dysfx and RV dysfx. Pt's RVR improved once diuresed well and transitioned to PO rate control agents and diuretics. Pt eventually transitioned off Hiflo to 4LNC.     Problem: New onset atrial fibrillation.   ·  Plan: Afib with RVR, rate improved   C/w Apixaban   Metoprolol for rate control  RVR , will increase po Fluid to 1.5 L as appear low volume, HR better controlled.     Problem/Plan - 2:  ·  Problem: Acute CHF.   ·  Plan: TTE with EF 45-50%,  grade III DD,  Enlarged right ventricular cavity size and reduced right ventricular systolic function. mod TR  off lasix drip and cardizem drip, HR 80-110s  BNP 5400 ->3200->1600    -currently on Bumex 4 mg  po daily, aldactone 50 mg daily  -losartan 25 daily   -Strict I&O, daily weights, diuresing well with significant weight loss, no accurately reported   -Replete Mg to 2, and K+ to 4, monitor renal function closely, creat .93 today  -s/p Diamox dosing periodically for elevated bicarb , consider giving again as contraction alkalosis   -cont on Toprol 150/d dig .125mg po daily, Dig level .71 10/8  -cont on Eliquis 5mg bid for AC  -outpatient follow up with EP cardiology Dr. Leonie Quinn, outpatient sleep study.     Problem/Plan - 3:  ·  Problem: Hypertension.   ·  Plan: BP at goal  C/w Antihypertensives as above.     Problem/Plan - 4:  ·  Problem: Gout.   ·  Plan: allopurinol 300 milliGRAM(s) Oral daily.     Problem/Plan - 5:  ·  Problem: Morbid obesity.   ·  Plan: Weight loss   Nutrition consult.     Problem/Plan - 6:  ·  Problem: Acute respiratory failure with hypoxia and hypercapnia.   ·  Plan: Currently on 4 L O2,  PT. Incentive spirometry   Wean off as tolerated , d/w RN  Sleep study outpatient   Pulmonary follow up appreciated.     Problem/Plan - 7:  ·  Problem: Open wound.   ·  Plan: Wound consult RLE wound    Patient is caring for the wound at home, no home care.     Problem/Plan - 8:  ·  Problem: Need for prophylactic measure.   ·  Plan: Apixaban    Dispo : Home with o2      67 yo m pmhx obesity, HTN on metoprolol XL and amlodipine, HLD, PVD and gout admitted with new onset JOVAN, acute congestive heart failure, acute hypoxic respiratory failure, component of pulmonary edema, b/l LE edema with ulcerations of right leg.   RRT earlier during admission for JOVAN, HR in the 140-160s, increasing fio2 requirements.  Dilt gtt increased from 5 to 7.5 and ICU consulted. Pt admitted to ICU on Hiflo NC and agressivley diuresed with bumex drip. TTE with severe diastolic dysfx and RV dysfx. Pt's RVR improved once diuresed well and transitioned to PO rate control agents and diuretics. Pt eventually transitioned off Hiflo to 4LNC.     Problem: New onset atrial fibrillation.   ·  Plan: Afib with RVR, rate improved   C/w Apixaban   Metoprolol for rate control  RVR , will increase po Fluid to 1.5 L as appear low volume, HR better controlled.     Problem/Plan - 2:  ·  Problem: Acute CHF.   ·  Plan: TTE with EF 45-50%,  grade III DD,  Enlarged right ventricular cavity size and reduced right ventricular systolic function. mod TR  off lasix drip and cardizem drip, HR 80-110s  BNP 5400 ->3200->1600    -currently on Bumex 4 mg  po daily, aldactone 50 mg daily  -losartan 25 daily   -Strict I&O, daily weights, diuresing well with significant weight loss, no accurately reported   -Replete Mg to 2, and K+ to 4, monitor renal function closely, creat .93 today  -s/p Diamox dosing periodically for elevated bicarb , consider giving again as contraction alkalosis   -cont on Toprol 150/d dig .125mg po daily, Dig level .71 10/8  -cont on Eliquis 5mg bid for AC  -outpatient follow up with EP cardiology Dr. Leonie Quinn, outpatient sleep study.     Problem/Plan - 3:  ·  Problem: Hypertension.   ·  Plan: BP at goal  C/w Antihypertensives as above.     Problem/Plan - 4:  ·  Problem: Gout.   ·  Plan: allopurinol 300 milliGRAM(s) Oral daily.     Problem/Plan - 5:  ·  Problem: Morbid obesity.   ·  Plan: Weight loss   Nutrition consult.     Problem/Plan - 6:  ·  Problem: Acute respiratory failure with hypoxia and hypercapnia.   ·  Plan: Currently on 4 L O2,  PT. Incentive spirometry   Wean off as tolerated , d/w RN  Sleep study outpatient   Pulmonary follow up appreciated.     Problem/Plan - 7:  ·  Problem: Open wound.   ·  Plan: Wound consult RLE wound    Patient is caring for the wound at home, no home care.     Problem/Plan - 8:  ·  Problem: Need for prophylactic measure.   ·  Plan: Apixaban    Dispo : Home with o2     Patient is stable for DC , PT recommended [facility], but patient opted for home. Will DC with HC as RLE wound.

## 2024-11-14 NOTE — PROGRESS NOTE ADULT - PROVIDER SPECIALTY LIST ADULT
Cardiology
Cardiology
Critical Care
Cardiology
Critical Care
Pulmonology
Cardiology
Critical Care
Pulmonology
Cardiology
Cardiology
Critical Care
Critical Care
Pulmonology
Hospitalist
Pulmonology
Critical Care
Hospitalist

## 2024-11-14 NOTE — PROGRESS NOTE ADULT - SUBJECTIVE AND OBJECTIVE BOX
Interval Events: NAEO. Patient seen & examined at bedside today. On 3L NC with Spo2 97%.    REVIEW OF SYSTEMS: all systems negative except HPI    OBJECTIVE:  ICU Vital Signs Last 24 Hrs  T(C): 36.5 (14 Nov 2024 04:51), Max: 37.2 (14 Nov 2024 00:14)  T(F): 97.7 (14 Nov 2024 04:51), Max: 99 (14 Nov 2024 00:14)  HR: 89 (14 Nov 2024 05:41) (82 - 105)  BP: 101/69 (14 Nov 2024 04:51) (101/69 - 121/78)  BP(mean): --  ABP: --  ABP(mean): --  RR: 18 (14 Nov 2024 04:51) (12 - 18)  SpO2: 98% (14 Nov 2024 05:41) (92% - 98%)    O2 Parameters below as of 14 Nov 2024 00:14  Patient On (Oxygen Delivery Method): BiPAP/CPAP              11-13 @ 07:01  -  11-14 @ 07:00  --------------------------------------------------------  IN: 360 mL / OUT: 1000 mL / NET: -640 mL      CAPILLARY BLOOD GLUCOSE          PHYSICAL EXAM:  GENERAL: obese, NAD, sitting in chair comfortably  HEAD:  Atraumatic, normocephalic  EYES: EOMI, PERRL  HEART: irregular rate and rhythm  LUNGS: Unlabored respirations.   ABDOMEN: Soft, nontender, nondistended  EXTREMITIES: warm, chronic LE edema   NERVOUS SYSTEM:  A&Ox3, moving all extremities, no focal deficits     HOSPITAL MEDICATIONS:  apixaban 5 milliGRAM(s) Oral every 12 hours      buMETAnide 4 milliGRAM(s) Oral daily  digoxin     Tablet 250 MICROGram(s) Oral daily  losartan 25 milliGRAM(s) Oral every 24 hours  metoprolol succinate ER 50 milliGRAM(s) Oral two times a day  spironolactone 50 milliGRAM(s) Oral daily    allopurinol 300 milliGRAM(s) Oral daily  dapagliflozin 10 milliGRAM(s) Oral daily  rosuvastatin 5 milliGRAM(s) Oral at bedtime      acetaminophen     Tablet .. 650 milliGRAM(s) Oral every 6 hours PRN  melatonin 6 milliGRAM(s) Oral at bedtime PRN  ondansetron Injectable 4 milliGRAM(s) IV Push every 8 hours PRN    bisacodyl 5 milliGRAM(s) Oral every 12 hours PRN  polyethylene glycol 3350 17 Gram(s) Oral daily  senna 2 Tablet(s) Oral at bedtime          influenza  Vaccine (HIGH DOSE) 0.5 milliLiter(s) IntraMuscular once    artificial  tears Solution 1 Drop(s) Left EYE every 12 hours  benzocaine/menthol Lozenge 1 Lozenge Oral every 6 hours PRN  chlorhexidine 2% Cloths 1 Application(s) Topical <User Schedule>  collagenase Ointment 1 Application(s) Topical daily        LABS:                        15.5   11.96 )-----------( 301      ( 13 Nov 2024 07:34 )             49.8     Hgb Trend: 15.5<--, 15.2<--, 14.9<--, 14.8<--, 14.4<--  11-13    138  |  98  |  35[H]  ----------------------------<  116[H]  3.7   |  34[H]  |  0.96    Ca    9.4      13 Nov 2024 07:34  Mg     1.8     11-13    TPro  7.4  /  Alb  3.1[L]  /  TBili  1.0  /  DBili  x   /  AST  39[H]  /  ALT  45  /  AlkPhos  69  11-13    Creatinine Trend: 0.96<--, 0.90<--, 0.93<--, 0.88<--, 1.06<--, 0.89<--    Urinalysis Basic - ( 13 Nov 2024 07:34 )    Color: x / Appearance: x / SG: x / pH: x  Gluc: 116 mg/dL / Ketone: x  / Bili: x / Urobili: x   Blood: x / Protein: x / Nitrite: x   Leuk Esterase: x / RBC: x / WBC x   Sq Epi: x / Non Sq Epi: x / Bacteria: x

## 2024-11-14 NOTE — PROGRESS NOTE ADULT - ASSESSMENT
68M PMH HTN, HLD, PVD, morbid obesity, gout presents for SOB. Sent from PCP office with new a-fib noted on EKG in office. Experiencing bilateral leg edema associated with orthopnea and exertional dyspnea worsening over 2 weeks. BNP: 2339. Admitted to medical service for new a-fib and CHF. s/p RRT for a-fib RVR, hypoxia. Upgraded to ICU for a-fib RVR with decompensated heart failure with hypoxia requiring high flow O2 supplementation. Diuresed with improvement. s/p cardizem drip, digoxin load, and up titration of beta blocker. ECHO with mildly to moderately decreased EF 45 to 50 %, severe (grade 3) left ventricular diastolic dysfunction, enlarged right ventricular cavity size and reduced right ventricular systolic function. Downgraded to medical service 11/10.     DX: acute hypoxic respiratory failure, acute combined systolic and diastolic CHF, RV failure, a-fib RVR, volume overload, pulmonary edema, obesity, suspected SHILOH    - on 4L NC  - goal to maintain O2 sat > 90%   - cont to wean down FIO2 as tolerates  - attempted to wean to 3L NC however O2 sat  decreased to 88%  - will maintain on 4L NC for now  - will need home O2 set up for hypoxic respiratory failure  - cont diuresis for volume overload, pulmonary congestion, and underlying RV failure with combined mild acute systolic and severe diastolic CHF: on bumex and spironolactone  - a-fib: anticoagulation with apixaban; rate control on digoxin, metoprolol  - cardiac management per cardiology  - reports occasional daytime somnolence (no sx while driving), +apneas + snoring never had PSG but likely has SHILOH. also reports some chronic wheezing for past few years and as a child ? underlying asthma. Never smoked  - will need outpatient PFTs and sleep study on discharge. should follow up with pulmonary office [address] [place]. Tel [phone]  - cont nocturnal NIV  - working with PT, ambulating  - OOB to chair  - incentive spirometer  - weight loss needed  - will arrange for patient to have home tele pulmonary visit on discharge  - d/c planning pending home O2 delivery

## 2024-11-14 NOTE — PROGRESS NOTE ADULT - TIME BILLING
review of chart, blood work, vitals, medications, imaging, direct patient care, d/w medicine NP
- Review of chart and consultation notes  - Exam and discussion with patient  - Review of laboratory and imaging   - Establishing a care plan for patient  - Communication with other providers
review of chart, blood work, vitals, medications, imaging, direct patient care
- Review of chart and consultation notes  - Exam and discussion with patient  - Review of laboratory and imaging   - Establishing a care plan for patient  - Communication with other providers
review of laboratory data, radiology results, discussion with primary team\patient, and monitoring for potential decompensation. Interventions were performed as documented above
- Review of chart and consultation notes  - Exam and discussion with patient  - Review of laboratory and imaging   - Establishing a care plan for patient  - Communication with other providers
- Review of chart and consultation notes  - Exam and discussion with patient  - Review of laboratory and imaging   - Establishing a care plan for patient  - Communication with other providers

## 2024-11-14 NOTE — PROGRESS NOTE ADULT - SUBJECTIVE AND OBJECTIVE BOX
24 hr events:  ambulated with PT  yesterday  denies TRINH  no SOB  "I feel good, ready to go"  on 4L NC O2 sat 93-97%  attempted to wean to 3L NC but O2 sat 88%, placed back on 4L NC  home O2 ordered, pending home delivery      ## ROS:  no fever, no chills  no HA, no dizziness  no visual changes, no auditory changes  no sore throat, no sinus congestion  no SOB, no cough  no chest pain, no palpitations  no abdominal pain, no N/V/D  no dysuria, no hematuria  no myalgias, no arthralgias  no swelling  no rashes, no pruritis    ## Labs:  CBC:                        15.2   9.40  )-----------( 310      ( 14 Nov 2024 07:45 )             49.6     Chem:  11-14    136  |  97  |  38[H]  ----------------------------<  126[H]  4.2   |  33[H]  |  1.13    Ca    9.0      14 Nov 2024 07:45  Phos  3.8     11-14  Mg     2.2     11-14    TPro  7.6  /  Alb  2.9[L]  /  TBili  0.9  /  DBili  x   /  AST  44[H]  /  ALT  46  /  AlkPhos  67  11-14    Pro-Brain Natriuretic Peptide (11.11.24 @ 03:10)    Pro-Brain Natriuretic Peptide: 1378 pg/mL    FluA/FluB/COVID PCR (10.30.24 @ 20:49)   SARS-CoV-2 Result: NotDetec: EUA/IVD   Influenza A Result: NotDetec: EUA/IVD   Influenza B Result: NotDetec: EUA/IVD      Culture - Urine (11.01.24 @ 02:20)    Specimen Source: Clean Catch Clean Catch (Midstream)   Culture Results: <10,000 CFU/mL Normal Urogenital [place]      Culture - Blood (11.01.24 @ 02:20)    Specimen Source: .Blood BLOOD   Culture Results: No growth at 5 days        ## Imaging:  < from: TTE Echo Complete w/ Contrast w/ Doppler (11.01.24 @ 10:28) >  CONCLUSIONS:     1. Left ventricular systolic function is mildly to moderately decreased   with an ejection fraction visually estimated at 45 to 50 %.   2. There is severe (grade 3) left ventricular diastolic dysfunction.   3. Enlarged right ventricular cavity size and reduced right ventricular   systolic function.   4. Left atrium is mildly dilated.   5. Thickened mitral valve leaflets.   6. Mild mitral valve leaflet calcification.   7. There is mild calcification of the mitral valve annulus.   8. Trace mitral regurgitation.   9. Moderate tricuspid regurgitation.  10. Fibrocalcific aortic valve sclerosis without stenosis.  11. No pericardial effusion seen.    -----------------------  < from: CT Angio Chest PE Protocol w/ IV Cont (10.30.24 @ 23:13) >    LUNGS AND LARGE AIRWAYS: Patent central airways. Approximately 2.8 cm   peripheral airspace consolidation in the left upper lobe. Right lower   lobe atelectasis. Mosaic perfusion throughout the lungs suggestive of air   trapping. 1.2 cm nodular airspace consolidation in the left lower lobe.  PLEURA: Small right pleural effusion.  VESSELS: Aortic calcifications. Coronary artery calcifications.   Respiratory motion and suboptimal contrast bolus timing limits evaluation   of the distal pulmonary vasculature. Evaluation of the distal segmental   and subsegmental pulmonary arteries is nondiagnostic. Within this   constraint: No evidence of main/proximal segmental pulmonary embolus.  HEART: Cardiomegaly. No pericardial effusion.  MEDIASTINUM AND KANDI: No lymphadenopathy.  CHEST WALL AND LOWER NECK: Within normal limits.  VISUALIZED UPPER ABDOMEN: Eventration of the diaphragm on the right.   Within normal limits.  BONES: Degenerative changes.    IMPRESSION:  Exam limited due to combination of respiratory motion artifact,   suboptimal contrast bolus timing and photon starvation. Within this   constraint:    No evidence of main/proximal segmental pulmonary embolus.    Left upper lobe peripheral airspace consolidation and 1.2 cm nodular   airspace consolidation in the left lower lobe. Differential diagnosis   includes pulmonary infarct, infection, inflammation or less likely   masslike consolidation. Follow-up to resolution recommended.    Small right pleural effusion.        ## Medications:  buMETAnide 4 milliGRAM(s) Oral daily  digoxin     Tablet 250 MICROGram(s) Oral daily  losartan 25 milliGRAM(s) Oral every 24 hours  metoprolol succinate ER 50 milliGRAM(s) Oral two times a day  spironolactone 50 milliGRAM(s) Oral daily      allopurinol 300 milliGRAM(s) Oral daily  dapagliflozin 10 milliGRAM(s) Oral daily  rosuvastatin 5 milliGRAM(s) Oral at bedtime    apixaban 5 milliGRAM(s) Oral every 12 hours    bisacodyl 5 milliGRAM(s) Oral every 12 hours PRN  polyethylene glycol 3350 17 Gram(s) Oral daily  senna 2 Tablet(s) Oral at bedtime    acetaminophen     Tablet .. 650 milliGRAM(s) Oral every 6 hours PRN  melatonin 6 milliGRAM(s) Oral at bedtime PRN  ondansetron Injectable 4 milliGRAM(s) IV Push every 8 hours PRN      ## Vitals:  T(C): 36.4 (11-14-24 @ 16:19), Max: 37.2 (11-14-24 @ 00:14)  HR: 90 (11-14-24 @ 16:19) (84 - 118)  BP: 113/73 (11-14-24 @ 16:19) (101/69 - 120/70)  RR: 18 (11-14-24 @ 16:19) (18 - 19)  SpO2: 94% (11-14-24 @ 16:19) (88% - 98%)      Patient On (Oxygen Delivery Method): nasal cannula  O2 Flow (L/min): 4        11-13 @ 07:01  -  11-14 @ 07:00  --------------------------------------------------------  IN: 360 mL / OUT: 1000 mL / NET: -640 mL    11-14 @ 07:01  -  11-14 @ 17:17  --------------------------------------------------------  IN: 310 mL / OUT: 800 mL / NET: -490 mL          ## P/E:  Gen: obese male, lying comfortably in bed in no apparent distress  HEENT: NC/AT, PERRL, EOMI, moist mucus membrane  Resp: no wheeze, R basilar crackles  CVS: RRR  Abd: soft NT/ND +BS + protuberant/obese abdomen  Ext: lower extremity edema (improved), no cyanosis, no clubbing  Neuro: A&Ox3, no focal deficits

## 2024-11-14 NOTE — DISCHARGE NOTE NURSING/CASE MANAGEMENT/SOCIAL WORK - PATIENT PORTAL LINK FT
You can access the FollowMyHealth Patient Portal offered by [facility] by registering at the following website: [email or web address]. By joining [employer]’s FollowMyHealth portal, you will also be able to view your health information using other applications (apps) compatible with our system.

## 2024-11-14 NOTE — DISCHARGE NOTE PROVIDER - ATTENDING DISCHARGE PHYSICAL EXAMINATION:
.  VITAL SIGNS:  T(C): 36.8 (11-14-24 @ 10:18), Max: 37.2 (11-14-24 @ 00:14)  T(F): 98.2 (11-14-24 @ 10:18), Max: 99 (11-14-24 @ 00:14)  HR: 98 (11-14-24 @ 12:54) (82 - 118)  BP: 120/70 (11-14-24 @ 12:54) (101/69 - 120/70)  BP(mean): --  RR: 19 (11-14-24 @ 10:18) (16 - 19)  SpO2: 95% (11-14-24 @ 12:54) (88% - 98%)  Wt(kg): --    PHYSICAL EXAM:    Constitutional: WDWN resting comfortably in bed; NAD  Head: NC/AT  Eyes: PERRL, EOMI, clear conjunctiva  ENT: no nasal discharge; uvula midline, no oropharyngeal erythema or exudates;   Neck: supple;   Respiratory: Diminished breat sounds bases B/L   Cardiac: irregularly irregular rhytm   Gastrointestinal: soft, NT/ND; no rebound or guarding; +BSx4  Back: spine midline, no bony tenderness or step-offs; no CVAT B/L  Extremities: WWP, no clubbing or cyanosis; no peripheral edema  Musculoskeletal: NROM x4; no joint swelling, tenderness or erythema  Dermatologic: skin warm, RLE wound   Neurologic: AAOx3; CNII-XII grossly intact; no focal deficits  Psychiatric: affect and characteristics of appearance, verbalizations, behaviors are appropriate

## 2024-11-14 NOTE — DISCHARGE NOTE PROVIDER - DISCHARGE DIET
DASH Diet/Other Diet Instructions DASH Diet/Consistent Carbohydrate Diabetic Diets/Other Diet Instructions

## 2024-11-14 NOTE — PROGRESS NOTE ADULT - REASON FOR ADMISSION
New onset Afib & CHF

## 2024-11-14 NOTE — DISCHARGE NOTE PROVIDER - PROVIDER TOKENS
PROVIDER:[TOKEN:[[id number]],FOLLOWUP:[1 week]],FREE:[LAST:[[facility]],PHONE:[(   )    -],FAX:[(   )    -],ADDRESS:[[address] [place] [address]],FOLLOWUP:[1 week]]

## 2024-11-17 DIAGNOSIS — M10.9 GOUT, UNSPECIFIED: ICD-10-CM

## 2024-11-17 DIAGNOSIS — J96.01 ACUTE RESPIRATORY FAILURE WITH HYPOXIA: ICD-10-CM

## 2024-11-17 DIAGNOSIS — I50.33 ACUTE ON CHRONIC DIASTOLIC (CONGESTIVE) HEART FAILURE: ICD-10-CM

## 2024-11-17 DIAGNOSIS — I48.91 UNSPECIFIED ATRIAL FIBRILLATION: ICD-10-CM

## 2024-11-17 DIAGNOSIS — I27.20 PULMONARY HYPERTENSION, UNSPECIFIED: ICD-10-CM

## 2024-11-17 DIAGNOSIS — I11.0 HYPERTENSIVE HEART DISEASE WITH HEART FAILURE: ICD-10-CM

## 2024-11-17 DIAGNOSIS — E66.01 MORBID (SEVERE) OBESITY DUE TO EXCESS CALORIES: ICD-10-CM

## 2024-11-18 ENCOUNTER — APPOINTMENT (OUTPATIENT)
Dept: PULMONOLOGY | Facility: CLINIC | Age: 69
End: 2024-11-18

## 2024-11-18 DIAGNOSIS — J96.11 CHRONIC RESPIRATORY FAILURE WITH HYPOXIA: ICD-10-CM

## 2024-11-18 DIAGNOSIS — Z99.81 CHRONIC RESPIRATORY FAILURE WITH HYPOXIA: ICD-10-CM

## 2024-11-18 DIAGNOSIS — I50.43 ACUTE ON CHRONIC COMBINED SYSTOLIC (CONGESTIVE) AND DIASTOLIC (CONGESTIVE) HEART FAILURE: ICD-10-CM

## 2024-11-18 DIAGNOSIS — I48.0 PAROXYSMAL ATRIAL FIBRILLATION: ICD-10-CM

## 2024-11-18 PROCEDURE — 99496 TRANSJ CARE MGMT HIGH F2F 7D: CPT

## 2024-11-18 RX ORDER — LOSARTAN POTASSIUM 25 MG/1
25 TABLET, FILM COATED ORAL DAILY
Qty: 30 | Refills: 5 | Status: ACTIVE | COMMUNITY
Start: 2024-11-18 | End: 1900-01-01

## 2024-11-20 ENCOUNTER — APPOINTMENT (OUTPATIENT)
Dept: PULMONOLOGY | Facility: CLINIC | Age: 69
End: 2024-11-20
Payer: MEDICARE

## 2024-11-20 VITALS
SYSTOLIC BLOOD PRESSURE: 94 MMHG | TEMPERATURE: 97.2 F | RESPIRATION RATE: 18 BRPM | OXYGEN SATURATION: 96 % | DIASTOLIC BLOOD PRESSURE: 61 MMHG | HEIGHT: 70 IN | BODY MASS INDEX: 45.1 KG/M2 | HEART RATE: 114 BPM | WEIGHT: 315 LBS

## 2024-11-20 PROBLEM — J96.11 CHRONIC RESPIRATORY FAILURE WITH HYPOXIA, ON HOME O2 THERAPY: Status: ACTIVE | Noted: 2024-11-20

## 2024-11-20 PROCEDURE — 99204 OFFICE O/P NEW MOD 45 MIN: CPT

## 2024-11-20 PROCEDURE — 99214 OFFICE O/P EST MOD 30 MIN: CPT

## 2024-11-24 PROBLEM — I48.0 PAF (PAROXYSMAL ATRIAL FIBRILLATION): Status: ACTIVE | Noted: 2024-11-24

## 2024-11-24 PROBLEM — I50.43 ACUTE ON CHRONIC HEART FAILURE WITH REDUCED EJECTION FRACTION AND DIASTOLIC DYSFUNCTION: Status: ACTIVE | Noted: 2024-11-24

## 2024-11-24 RX ORDER — DAPAGLIFLOZIN 10 MG/1
10 TABLET, FILM COATED ORAL
Refills: 0 | Status: ACTIVE | COMMUNITY

## 2024-11-24 RX ORDER — BUMETANIDE 2 MG/1
2 TABLET ORAL
Qty: 30 | Refills: 2 | Status: ACTIVE | COMMUNITY

## 2024-11-24 RX ORDER — METOPROLOL SUCCINATE 50 MG/1
50 TABLET, EXTENDED RELEASE ORAL
Refills: 3 | Status: ACTIVE | COMMUNITY

## 2024-11-24 RX ORDER — DIGOXIN 250 MCG
0.25 TABLET ORAL
Refills: 0 | Status: ACTIVE | COMMUNITY

## 2024-11-24 RX ORDER — SPIRONOLACTONE 50 MG/1
50 TABLET ORAL
Refills: 0 | Status: ACTIVE | COMMUNITY

## 2024-11-24 RX ORDER — APIXABAN 5 MG/1
5 TABLET, FILM COATED ORAL
Refills: 0 | Status: ACTIVE | COMMUNITY

## 2024-11-25 ENCOUNTER — NON-APPOINTMENT (OUTPATIENT)
Age: 69
End: 2024-11-25

## 2024-11-25 ENCOUNTER — APPOINTMENT (OUTPATIENT)
Dept: ELECTROPHYSIOLOGY | Facility: CLINIC | Age: 69
End: 2024-11-25
Payer: MEDICARE

## 2024-11-25 ENCOUNTER — APPOINTMENT (OUTPATIENT)
Dept: CARDIOLOGY | Facility: CLINIC | Age: 69
End: 2024-11-25
Payer: MEDICARE

## 2024-11-25 VITALS
DIASTOLIC BLOOD PRESSURE: 76 MMHG | OXYGEN SATURATION: 97 % | BODY MASS INDEX: 45.1 KG/M2 | HEART RATE: 75 BPM | HEIGHT: 70 IN | SYSTOLIC BLOOD PRESSURE: 111 MMHG | WEIGHT: 315 LBS | TEMPERATURE: 98 F

## 2024-11-25 DIAGNOSIS — I87.2 VENOUS INSUFFICIENCY (CHRONIC) (PERIPHERAL): ICD-10-CM

## 2024-11-25 DIAGNOSIS — R06.02 SHORTNESS OF BREATH: ICD-10-CM

## 2024-11-25 DIAGNOSIS — E66.813 OBESITY, CLASS 3: ICD-10-CM

## 2024-11-25 DIAGNOSIS — E66.01 OBESITY, CLASS 3: ICD-10-CM

## 2024-11-25 DIAGNOSIS — I10 ESSENTIAL (PRIMARY) HYPERTENSION: ICD-10-CM

## 2024-11-25 PROCEDURE — G2211 COMPLEX E/M VISIT ADD ON: CPT

## 2024-11-25 PROCEDURE — 93000 ELECTROCARDIOGRAM COMPLETE: CPT

## 2024-11-25 PROCEDURE — 99204 OFFICE O/P NEW MOD 45 MIN: CPT

## 2024-11-25 PROCEDURE — 99205 OFFICE O/P NEW HI 60 MIN: CPT | Mod: 25

## 2024-12-17 ENCOUNTER — NON-APPOINTMENT (OUTPATIENT)
Age: 69
End: 2024-12-17

## 2024-12-20 ENCOUNTER — APPOINTMENT (OUTPATIENT)
Dept: SLEEP CENTER | Facility: CLINIC | Age: 69
End: 2024-12-20

## 2024-12-23 ENCOUNTER — APPOINTMENT (OUTPATIENT)
Dept: CARDIOLOGY | Facility: CLINIC | Age: 69
End: 2024-12-23
Payer: MEDICARE

## 2024-12-23 ENCOUNTER — LABORATORY RESULT (OUTPATIENT)
Age: 69
End: 2024-12-23

## 2024-12-23 ENCOUNTER — NON-APPOINTMENT (OUTPATIENT)
Age: 69
End: 2024-12-23

## 2024-12-23 VITALS
WEIGHT: 315 LBS | DIASTOLIC BLOOD PRESSURE: 50 MMHG | SYSTOLIC BLOOD PRESSURE: 75 MMHG | HEART RATE: 84 BPM | OXYGEN SATURATION: 96 % | BODY MASS INDEX: 45.1 KG/M2 | HEIGHT: 70 IN

## 2024-12-23 VITALS — DIASTOLIC BLOOD PRESSURE: 60 MMHG | SYSTOLIC BLOOD PRESSURE: 90 MMHG

## 2024-12-23 PROCEDURE — 99214 OFFICE O/P EST MOD 30 MIN: CPT

## 2024-12-23 PROCEDURE — 36415 COLL VENOUS BLD VENIPUNCTURE: CPT

## 2024-12-23 PROCEDURE — G2211 COMPLEX E/M VISIT ADD ON: CPT

## 2024-12-23 PROCEDURE — 93000 ELECTROCARDIOGRAM COMPLETE: CPT

## 2024-12-24 ENCOUNTER — NON-APPOINTMENT (OUTPATIENT)
Age: 69
End: 2024-12-24

## 2024-12-24 LAB
ALBUMIN SERPL ELPH-MCNC: 4 G/DL
ALP BLD-CCNC: 56 U/L
ALT SERPL-CCNC: 18 U/L
ANION GAP SERPL CALC-SCNC: 23 MMOL/L
AST SERPL-CCNC: 21 U/L
BASOPHILS # BLD AUTO: 0.05 K/UL
BASOPHILS NFR BLD AUTO: 0.7 %
BILIRUB SERPL-MCNC: 1.1 MG/DL
BUN SERPL-MCNC: 112 MG/DL
CALCIUM SERPL-MCNC: 9.7 MG/DL
CHLORIDE SERPL-SCNC: 89 MMOL/L
CO2 SERPL-SCNC: 21 MMOL/L
CREAT SERPL-MCNC: 7.41 MG/DL
EGFR: 7 ML/MIN/1.73M2
EOSINOPHIL # BLD AUTO: 0.2 K/UL
EOSINOPHIL NFR BLD AUTO: 2.7 %
ESTIMATED AVERAGE GLUCOSE: 151 MG/DL
GLUCOSE SERPL-MCNC: 104 MG/DL
HBA1C MFR BLD HPLC: 6.9 %
HCT VFR BLD CALC: 43.6 %
HGB BLD-MCNC: 14 G/DL
IMM GRANULOCYTES NFR BLD AUTO: 0.7 %
LYMPHOCYTES # BLD AUTO: 1.75 K/UL
LYMPHOCYTES NFR BLD AUTO: 23.4 %
MAN DIFF?: NORMAL
MCHC RBC-ENTMCNC: 19.4 PG
MCHC RBC-ENTMCNC: 32.1 G/DL
MCV RBC AUTO: 60.4 FL
MONOCYTES # BLD AUTO: 0.89 K/UL
MONOCYTES NFR BLD AUTO: 11.9 %
NEUTROPHILS # BLD AUTO: 4.53 K/UL
NEUTROPHILS NFR BLD AUTO: 60.6 %
NT-PROBNP SERPL-MCNC: 7348 PG/ML
PLATELET # BLD AUTO: 229 K/UL
POTASSIUM SERPL-SCNC: 7.7 MMOL/L
PROT SERPL-MCNC: 6.8 G/DL
RBC # BLD: 7.22 M/UL
RBC # FLD: 18.9 %
SODIUM SERPL-SCNC: 133 MMOL/L
TSH SERPL-ACNC: 2.12 UIU/ML
WBC # FLD AUTO: 7.47 K/UL

## 2024-12-26 ENCOUNTER — LABORATORY RESULT (OUTPATIENT)
Age: 69
End: 2024-12-26

## 2024-12-26 ENCOUNTER — APPOINTMENT (OUTPATIENT)
Dept: CARDIOLOGY | Facility: CLINIC | Age: 69
End: 2024-12-26
Payer: MEDICARE

## 2024-12-26 PROCEDURE — 36415 COLL VENOUS BLD VENIPUNCTURE: CPT

## 2024-12-30 ENCOUNTER — APPOINTMENT (OUTPATIENT)
Dept: CARDIOLOGY | Facility: CLINIC | Age: 69
End: 2024-12-30
Payer: MEDICARE

## 2024-12-30 VITALS
DIASTOLIC BLOOD PRESSURE: 65 MMHG | HEART RATE: 74 BPM | WEIGHT: 315 LBS | OXYGEN SATURATION: 95 % | SYSTOLIC BLOOD PRESSURE: 97 MMHG | BODY MASS INDEX: 45.1 KG/M2 | HEIGHT: 70 IN

## 2024-12-30 DIAGNOSIS — I50.43 ACUTE ON CHRONIC COMBINED SYSTOLIC (CONGESTIVE) AND DIASTOLIC (CONGESTIVE) HEART FAILURE: ICD-10-CM

## 2024-12-30 DIAGNOSIS — I48.0 PAROXYSMAL ATRIAL FIBRILLATION: ICD-10-CM

## 2024-12-30 DIAGNOSIS — E66.813 OBESITY, CLASS 3: ICD-10-CM

## 2024-12-30 DIAGNOSIS — I10 ESSENTIAL (PRIMARY) HYPERTENSION: ICD-10-CM

## 2024-12-30 DIAGNOSIS — E78.5 HYPERLIPIDEMIA, UNSPECIFIED: ICD-10-CM

## 2024-12-30 DIAGNOSIS — R06.02 SHORTNESS OF BREATH: ICD-10-CM

## 2024-12-30 DIAGNOSIS — E66.01 OBESITY, CLASS 3: ICD-10-CM

## 2024-12-30 PROCEDURE — 36415 COLL VENOUS BLD VENIPUNCTURE: CPT

## 2024-12-30 PROCEDURE — 99214 OFFICE O/P EST MOD 30 MIN: CPT

## 2024-12-30 PROCEDURE — G2211 COMPLEX E/M VISIT ADD ON: CPT

## 2024-12-30 RX ORDER — ROSUVASTATIN CALCIUM 5 MG/1
5 TABLET, FILM COATED ORAL DAILY
Qty: 90 | Refills: 0 | Status: ACTIVE | COMMUNITY
Start: 2024-12-30

## 2025-01-02 LAB
ALBUMIN SERPL ELPH-MCNC: 4.1 G/DL
ALP BLD-CCNC: 50 U/L
ALT SERPL-CCNC: 20 U/L
ANION GAP SERPL CALC-SCNC: 14 MMOL/L
AST SERPL-CCNC: 24 U/L
BILIRUB SERPL-MCNC: 1 MG/DL
BUN SERPL-MCNC: 37 MG/DL
CALCIUM SERPL-MCNC: 9.8 MG/DL
CHLORIDE SERPL-SCNC: 99 MMOL/L
CO2 SERPL-SCNC: 25 MMOL/L
CREAT SERPL-MCNC: 1.89 MG/DL
DIGITOXIN SERPL-MCNC: <5 NG/ML
DIGOXIN SERPL-MCNC: 0.9 NG/ML
EGFR: 38 ML/MIN/1.73M2
GLUCOSE SERPL-MCNC: 122 MG/DL
NT-PROBNP SERPL-MCNC: 5242 PG/ML
POTASSIUM SERPL-SCNC: 5.5 MMOL/L
PROT SERPL-MCNC: 6.5 G/DL
SODIUM SERPL-SCNC: 138 MMOL/L

## 2025-01-06 ENCOUNTER — OUTPATIENT (OUTPATIENT)
Dept: OUTPATIENT SERVICES | Facility: HOSPITAL | Age: 70
LOS: 1 days | End: 2025-01-06

## 2025-01-06 VITALS
DIASTOLIC BLOOD PRESSURE: 71 MMHG | HEIGHT: 69.5 IN | RESPIRATION RATE: 16 BRPM | SYSTOLIC BLOOD PRESSURE: 100 MMHG | TEMPERATURE: 98 F | OXYGEN SATURATION: 96 % | HEART RATE: 73 BPM | WEIGHT: 294.1 LBS

## 2025-01-06 DIAGNOSIS — Z87.39 PERSONAL HISTORY OF OTHER DISEASES OF THE MUSCULOSKELETAL SYSTEM AND CONNECTIVE TISSUE: ICD-10-CM

## 2025-01-06 DIAGNOSIS — I48.0 PAROXYSMAL ATRIAL FIBRILLATION: ICD-10-CM

## 2025-01-06 DIAGNOSIS — Z98.890 OTHER SPECIFIED POSTPROCEDURAL STATES: Chronic | ICD-10-CM

## 2025-01-06 LAB
ANION GAP SERPL CALC-SCNC: 14 MMOL/L — SIGNIFICANT CHANGE UP (ref 7–14)
ANISOCYTOSIS BLD QL: SLIGHT — SIGNIFICANT CHANGE UP
BASOPHILS # BLD AUTO: 0.05 K/UL — SIGNIFICANT CHANGE UP (ref 0–0.2)
BASOPHILS NFR BLD AUTO: 0.7 % — SIGNIFICANT CHANGE UP (ref 0–2)
BLD GP AB SCN SERPL QL: NEGATIVE — SIGNIFICANT CHANGE UP
BUN SERPL-MCNC: 20 MG/DL — SIGNIFICANT CHANGE UP (ref 7–23)
CALCIUM SERPL-MCNC: 9.5 MG/DL — SIGNIFICANT CHANGE UP (ref 8.4–10.5)
CHLORIDE SERPL-SCNC: 101 MMOL/L — SIGNIFICANT CHANGE UP (ref 98–107)
CO2 SERPL-SCNC: 23 MMOL/L — SIGNIFICANT CHANGE UP (ref 22–31)
CREAT SERPL-MCNC: 1.24 MG/DL — SIGNIFICANT CHANGE UP (ref 0.5–1.3)
DACRYOCYTES BLD QL SMEAR: SLIGHT — SIGNIFICANT CHANGE UP
EGFR: 63 ML/MIN/1.73M2 — SIGNIFICANT CHANGE UP
EOSINOPHIL # BLD AUTO: 0.42 K/UL — SIGNIFICANT CHANGE UP (ref 0–0.5)
EOSINOPHIL NFR BLD AUTO: 5.5 % — SIGNIFICANT CHANGE UP (ref 0–6)
GLUCOSE SERPL-MCNC: 97 MG/DL — SIGNIFICANT CHANGE UP (ref 70–99)
HCT VFR BLD CALC: 43.7 % — SIGNIFICANT CHANGE UP (ref 39–50)
HGB BLD-MCNC: 13.4 G/DL — SIGNIFICANT CHANGE UP (ref 13–17)
HYPOCHROMIA BLD QL: SLIGHT — SIGNIFICANT CHANGE UP
IMM GRANULOCYTES NFR BLD AUTO: 0.5 % — SIGNIFICANT CHANGE UP (ref 0–0.9)
LYMPHOCYTES # BLD AUTO: 1.95 K/UL — SIGNIFICANT CHANGE UP (ref 1–3.3)
LYMPHOCYTES # BLD AUTO: 25.4 % — SIGNIFICANT CHANGE UP (ref 13–44)
MANUAL SMEAR VERIFICATION: SIGNIFICANT CHANGE UP
MCHC RBC-ENTMCNC: 19.4 PG — LOW (ref 27–34)
MCHC RBC-ENTMCNC: 30.7 G/DL — LOW (ref 32–36)
MCV RBC AUTO: 63.2 FL — LOW (ref 80–100)
MICROCYTES BLD QL: SLIGHT — SIGNIFICANT CHANGE UP
MONOCYTES # BLD AUTO: 1.03 K/UL — HIGH (ref 0–0.9)
MONOCYTES NFR BLD AUTO: 13.4 % — SIGNIFICANT CHANGE UP (ref 2–14)
NEUTROPHILS # BLD AUTO: 4.2 K/UL — SIGNIFICANT CHANGE UP (ref 1.8–7.4)
NEUTROPHILS NFR BLD AUTO: 54.5 % — SIGNIFICANT CHANGE UP (ref 43–77)
PLAT MORPH BLD: NORMAL — SIGNIFICANT CHANGE UP
PLATELET # BLD AUTO: 221 K/UL — SIGNIFICANT CHANGE UP (ref 150–400)
POIKILOCYTOSIS BLD QL AUTO: SIGNIFICANT CHANGE UP
POLYCHROMASIA BLD QL SMEAR: SLIGHT — SIGNIFICANT CHANGE UP
POTASSIUM SERPL-MCNC: 4.4 MMOL/L — SIGNIFICANT CHANGE UP (ref 3.5–5.3)
POTASSIUM SERPL-SCNC: 4.4 MMOL/L — SIGNIFICANT CHANGE UP (ref 3.5–5.3)
RBC # BLD: 6.92 M/UL — HIGH (ref 4.2–5.8)
RBC # FLD: 20.7 % — HIGH (ref 10.3–14.5)
RBC BLD AUTO: ABNORMAL
RH IG SCN BLD-IMP: POSITIVE — SIGNIFICANT CHANGE UP
RH IG SCN BLD-IMP: POSITIVE — SIGNIFICANT CHANGE UP
SCHISTOCYTES BLD QL AUTO: SLIGHT — SIGNIFICANT CHANGE UP
SODIUM SERPL-SCNC: 138 MMOL/L — SIGNIFICANT CHANGE UP (ref 135–145)
TARGETS BLD QL SMEAR: SLIGHT — SIGNIFICANT CHANGE UP
WBC # BLD: 7.69 K/UL — SIGNIFICANT CHANGE UP (ref 3.8–10.5)
WBC # FLD AUTO: 7.69 K/UL — SIGNIFICANT CHANGE UP (ref 3.8–10.5)

## 2025-01-06 NOTE — H&P PST ADULT - PROBLEM SELECTOR PLAN 1
scheduled for Afib ablation on 01/14/2024.   Verbal and written pre-op instructions provided to patient. Patient verbalized understanding and will call surgeons office for revised instructions if surgery is rescheduled.  Pepcid for GI prophylaxis provided.   patient given verbal and written instruction with teach back on chlorhexidine shampoo, and the patient verbalized understanding with return demonstration.    Per EP instructions pt.  advised to hold Metoprolol, ASA and Eliquis. scheduled for Afib ablation on 01/14/2024.   Verbal and written pre-op instructions provided to patient. Patient verbalized understanding and will call surgeons office for revised instructions if surgery is rescheduled.  Pepcid for GI prophylaxis provided.   patient given verbal and written instruction with teach back on chlorhexidine shampoo, and the patient verbalized understanding with return demonstration.    Per EP instructions pt.  advised to hold Metoprolol, ASA and Eliquis morning of surgery.

## 2025-01-06 NOTE — H&P PST ADULT - CARDIOVASCULAR COMMENTS
New AF with RVR with recent admission  10/31/24-11/14/2024 for acute congestive heart failure, acute hypoxic respiratory failure.   10/31/24 -He had shortness of breath with of component of pulmonary edema/ADHF with b/l LE edema with ulcerations of right leg. Pt. reports dyspnea and TRINH have resolved.

## 2025-01-06 NOTE — H&P PST ADULT - HISTORY OF PRESENT ILLNESS
68 yo M with Hx of obesity, HTN, HLD, PVD, gout and new AF with RVR with recent admission for acute congestive heart failure, acute hypoxic respiratory failure who is here for an initial evaluation. Pt was admitted with new onset JOVAN 10/31/24 with shortness of breath with of component of pulmonary edema/ADHF with b/l LE edema with ulcerations of right leg. He is scheduled for Afib ablation

## 2025-01-06 NOTE — H&P PST ADULT - GENITOURINARY COMMENTS
Pt had over diuresis and due to this developed KADI- losartan, spirinolactone and Bumex discontinued  per EP

## 2025-01-13 NOTE — ASU PATIENT PROFILE, ADULT - MEDICATIONS TO HOLD
Farxiga been held by MD a month ago as per pt, Eliquis and metoprolol, aspirin to hold am dose on 1/14/2025

## 2025-01-13 NOTE — ASU PATIENT PROFILE, ADULT - FALL HARM RISK - UNIVERSAL INTERVENTIONS
Bed in lowest position, wheels locked, appropriate side rails in place/Call bell, personal items and telephone in reach/Instruct patient to call for assistance before getting out of bed or chair/Non-slip footwear when patient is out of bed/[place] to call system/Physically safe environment - no spills, clutter or unnecessary equipment/Purposeful Proactive Rounding/Room/bathroom lighting operational, light cord in reach

## 2025-01-14 ENCOUNTER — OUTPATIENT (OUTPATIENT)
Dept: OUTPATIENT SERVICES | Facility: HOSPITAL | Age: 70
LOS: 1 days | Discharge: HOME | End: 2025-01-14
Payer: MEDICARE

## 2025-01-14 ENCOUNTER — TRANSCRIPTION ENCOUNTER (OUTPATIENT)
Age: 70
End: 2025-01-14

## 2025-01-14 VITALS
DIASTOLIC BLOOD PRESSURE: 89 MMHG | SYSTOLIC BLOOD PRESSURE: 127 MMHG | RESPIRATION RATE: 18 BRPM | OXYGEN SATURATION: 97 % | HEART RATE: 82 BPM

## 2025-01-14 VITALS
TEMPERATURE: 98 F | HEART RATE: 122 BPM | WEIGHT: 296.96 LBS | RESPIRATION RATE: 20 BRPM | DIASTOLIC BLOOD PRESSURE: 78 MMHG | SYSTOLIC BLOOD PRESSURE: 124 MMHG | HEIGHT: 70 IN | OXYGEN SATURATION: 94 %

## 2025-01-14 DIAGNOSIS — I48.0 PAROXYSMAL ATRIAL FIBRILLATION: ICD-10-CM

## 2025-01-14 DIAGNOSIS — Z98.890 OTHER SPECIFIED POSTPROCEDURAL STATES: Chronic | ICD-10-CM

## 2025-01-14 PROCEDURE — 93656 COMPRE EP EVAL ABLTJ ATR FIB: CPT

## 2025-01-14 PROCEDURE — 93657 TX L/R ATRIAL FIB ADDL: CPT

## 2025-01-14 PROCEDURE — 93010 ELECTROCARDIOGRAM REPORT: CPT | Mod: 76

## 2025-01-14 PROCEDURE — 93655 ICAR CATH ABLTJ DSCRT ARRHYT: CPT

## 2025-01-14 PROCEDURE — 93623 PRGRMD STIMJ&PACG IV RX NFS: CPT | Mod: 26

## 2025-01-14 RX ORDER — NOREPINEPHRINE BITARTRATE 1 MG/ML
0.07 INJECTION INTRAVENOUS
Qty: 8 | Refills: 0 | Status: DISCONTINUED | OUTPATIENT
Start: 2025-01-14 | End: 2025-01-14

## 2025-01-14 RX ORDER — APIXABAN 5 MG/1
1 TABLET, FILM COATED ORAL
Refills: 0 | DISCHARGE

## 2025-01-14 RX ORDER — METOPROLOL TARTRATE 50 MG
1 TABLET ORAL
Refills: 0 | DISCHARGE

## 2025-01-14 RX ORDER — SODIUM CHLORIDE 9 MG/ML
3 INJECTION, SOLUTION INTRAMUSCULAR; INTRAVENOUS; SUBCUTANEOUS EVERY 8 HOURS
Refills: 0 | Status: DISCONTINUED | OUTPATIENT
Start: 2025-01-14 | End: 2025-01-28

## 2025-01-14 RX ADMIN — NOREPINEPHRINE BITARTRATE 17.7 MICROGRAM(S)/KG/MIN: 1 INJECTION INTRAVENOUS at 14:02

## 2025-01-14 NOTE — ASU DISCHARGE PLAN (ADULT/PEDIATRIC) - ASU DC SPECIAL INSTRUCTIONSFT
Patient is s/p AFib ablation  with Dr. Hadley.  Teaching provided to patient regarding right groin site care.  Right groin without hematoma, ecchymosis, drainage, pain, or bleeding.    - may shower after 24 hrs, otherwise keep groin incision sites dry and clean.    - Avoid activities such as jogging/excessive stair climbing/weight lifting for the next 7 days    - Take Acetaminophen (Tylenol) 500mg, one to two tablets every 8 hours as needed for pain relief.    - Pt was instructed to call [phone] if the following occurs:      - fever with temperature > 101      - swelling or bleeding at the groin incision site(s)  - Outpatient F/U is scheduled with Dr. Hadley on 2/24/25 @ 9:30am    All questions answered to patient's satisfaction.  Follow up letter and instructions left in the care of the patient.

## 2025-01-14 NOTE — ASU DISCHARGE PLAN (ADULT/PEDIATRIC) - FINANCIAL ASSISTANCE
[facility] provides services at a reduced cost to those who are determined to be eligible through [facility]’s financial assistance program. Information regarding [facility]’s financial assistance program can be found by going to [email or web address] or by calling [phone].

## 2025-02-03 ENCOUNTER — APPOINTMENT (OUTPATIENT)
Dept: CARDIOLOGY | Facility: CLINIC | Age: 70
End: 2025-02-03
Payer: MEDICARE

## 2025-02-03 ENCOUNTER — NON-APPOINTMENT (OUTPATIENT)
Age: 70
End: 2025-02-03

## 2025-02-03 ENCOUNTER — APPOINTMENT (OUTPATIENT)
Dept: PULMONOLOGY | Facility: CLINIC | Age: 70
End: 2025-02-03

## 2025-02-03 VITALS
BODY MASS INDEX: 45.1 KG/M2 | HEART RATE: 55 BPM | OXYGEN SATURATION: 95 % | SYSTOLIC BLOOD PRESSURE: 123 MMHG | DIASTOLIC BLOOD PRESSURE: 81 MMHG | WEIGHT: 315 LBS | HEIGHT: 70 IN

## 2025-02-03 DIAGNOSIS — I87.2 VENOUS INSUFFICIENCY (CHRONIC) (PERIPHERAL): ICD-10-CM

## 2025-02-03 DIAGNOSIS — Z83.3 FAMILY HISTORY OF DIABETES MELLITUS: ICD-10-CM

## 2025-02-03 DIAGNOSIS — R06.02 SHORTNESS OF BREATH: ICD-10-CM

## 2025-02-03 DIAGNOSIS — Z82.49 FAMILY HISTORY OF ISCHEMIC HEART DISEASE AND OTHER DISEASES OF THE CIRCULATORY SYSTEM: ICD-10-CM

## 2025-02-03 DIAGNOSIS — E78.5 HYPERLIPIDEMIA, UNSPECIFIED: ICD-10-CM

## 2025-02-03 DIAGNOSIS — I48.0 PAROXYSMAL ATRIAL FIBRILLATION: ICD-10-CM

## 2025-02-03 DIAGNOSIS — I50.43 ACUTE ON CHRONIC COMBINED SYSTOLIC (CONGESTIVE) AND DIASTOLIC (CONGESTIVE) HEART FAILURE: ICD-10-CM

## 2025-02-03 DIAGNOSIS — I10 ESSENTIAL (PRIMARY) HYPERTENSION: ICD-10-CM

## 2025-02-03 DIAGNOSIS — E66.01 MORBID (SEVERE) OBESITY DUE TO EXCESS CALORIES: ICD-10-CM

## 2025-02-03 DIAGNOSIS — E78.00 PURE HYPERCHOLESTEROLEMIA, UNSPECIFIED: ICD-10-CM

## 2025-02-03 PROCEDURE — 93000 ELECTROCARDIOGRAM COMPLETE: CPT

## 2025-02-03 PROCEDURE — 99214 OFFICE O/P EST MOD 30 MIN: CPT | Mod: 25

## 2025-02-24 ENCOUNTER — APPOINTMENT (OUTPATIENT)
Dept: ELECTROPHYSIOLOGY | Facility: CLINIC | Age: 70
End: 2025-02-24
Payer: MEDICARE

## 2025-02-24 ENCOUNTER — APPOINTMENT (OUTPATIENT)
Dept: CARDIOLOGY | Facility: CLINIC | Age: 70
End: 2025-02-24
Payer: MEDICARE

## 2025-02-24 ENCOUNTER — NON-APPOINTMENT (OUTPATIENT)
Age: 70
End: 2025-02-24

## 2025-02-24 ENCOUNTER — LABORATORY RESULT (OUTPATIENT)
Age: 70
End: 2025-02-24

## 2025-02-24 DIAGNOSIS — I87.2 VENOUS INSUFFICIENCY (CHRONIC) (PERIPHERAL): ICD-10-CM

## 2025-02-24 DIAGNOSIS — I10 ESSENTIAL (PRIMARY) HYPERTENSION: ICD-10-CM

## 2025-02-24 DIAGNOSIS — E78.00 PURE HYPERCHOLESTEROLEMIA, UNSPECIFIED: ICD-10-CM

## 2025-02-24 DIAGNOSIS — I50.20 UNSPECIFIED SYSTOLIC (CONGESTIVE) HEART FAILURE: ICD-10-CM

## 2025-02-24 DIAGNOSIS — Z98.890 OTHER SPECIFIED POSTPROCEDURAL STATES: ICD-10-CM

## 2025-02-24 DIAGNOSIS — Z86.79 OTHER SPECIFIED POSTPROCEDURAL STATES: ICD-10-CM

## 2025-02-24 DIAGNOSIS — R73.09 OTHER ABNORMAL GLUCOSE: ICD-10-CM

## 2025-02-24 DIAGNOSIS — E66.01 MORBID (SEVERE) OBESITY DUE TO EXCESS CALORIES: ICD-10-CM

## 2025-02-24 DIAGNOSIS — I48.0 PAROXYSMAL ATRIAL FIBRILLATION: ICD-10-CM

## 2025-02-24 DIAGNOSIS — R06.02 SHORTNESS OF BREATH: ICD-10-CM

## 2025-02-24 DIAGNOSIS — E78.5 HYPERLIPIDEMIA, UNSPECIFIED: ICD-10-CM

## 2025-02-24 DIAGNOSIS — I50.43 ACUTE ON CHRONIC COMBINED SYSTOLIC (CONGESTIVE) AND DIASTOLIC (CONGESTIVE) HEART FAILURE: ICD-10-CM

## 2025-02-24 DIAGNOSIS — I48.91 UNSPECIFIED ATRIAL FIBRILLATION: ICD-10-CM

## 2025-02-24 PROCEDURE — 93000 ELECTROCARDIOGRAM COMPLETE: CPT

## 2025-02-24 PROCEDURE — 36415 COLL VENOUS BLD VENIPUNCTURE: CPT

## 2025-02-24 PROCEDURE — G2211 COMPLEX E/M VISIT ADD ON: CPT

## 2025-02-24 PROCEDURE — 99214 OFFICE O/P EST MOD 30 MIN: CPT

## 2025-02-26 PROBLEM — R73.09 OTHER ABNORMAL GLUCOSE: Status: ACTIVE | Noted: 2025-02-26

## 2025-02-26 LAB
ALBUMIN SERPL ELPH-MCNC: 4.1 G/DL
ALP BLD-CCNC: 65 U/L
ALT SERPL-CCNC: 13 U/L
ANION GAP SERPL CALC-SCNC: 14 MMOL/L
AST SERPL-CCNC: 22 U/L
BASOPHILS # BLD AUTO: 0.04 K/UL
BASOPHILS NFR BLD AUTO: 0.5 %
BILIRUB SERPL-MCNC: 0.9 MG/DL
BUN SERPL-MCNC: 18 MG/DL
CALCIUM SERPL-MCNC: 9.4 MG/DL
CHLORIDE SERPL-SCNC: 102 MMOL/L
CHOLEST SERPL-MCNC: 125 MG/DL
CO2 SERPL-SCNC: 25 MMOL/L
CREAT SERPL-MCNC: 0.88 MG/DL
EGFR: 93 ML/MIN/1.73M2
EOSINOPHIL # BLD AUTO: 0.37 K/UL
EOSINOPHIL NFR BLD AUTO: 4.9 %
ESTIMATED AVERAGE GLUCOSE: 103 MG/DL
GLUCOSE SERPL-MCNC: 81 MG/DL
HBA1C MFR BLD HPLC: 5.2 %
HCT VFR BLD CALC: 45.7 %
HDLC SERPL-MCNC: 38 MG/DL
HGB BLD-MCNC: 13.9 G/DL
IMM GRANULOCYTES NFR BLD AUTO: 0.3 %
LDLC SERPL CALC-MCNC: 77 MG/DL
LYMPHOCYTES # BLD AUTO: 1.92 K/UL
LYMPHOCYTES NFR BLD AUTO: 25.5 %
MAN DIFF?: NORMAL
MCHC RBC-ENTMCNC: 20.1 PG
MCHC RBC-ENTMCNC: 30.4 G/DL
MCV RBC AUTO: 66.2 FL
MONOCYTES # BLD AUTO: 0.75 K/UL
MONOCYTES NFR BLD AUTO: 10 %
NEUTROPHILS # BLD AUTO: 4.42 K/UL
NEUTROPHILS NFR BLD AUTO: 58.8 %
NONHDLC SERPL-MCNC: 87 MG/DL
NT-PROBNP SERPL-MCNC: 661 PG/ML
PLATELET # BLD AUTO: 234 K/UL
POTASSIUM SERPL-SCNC: 5.2 MMOL/L
PROT SERPL-MCNC: 6.2 G/DL
RBC # BLD: 6.9 M/UL
RBC # FLD: 19.2 %
SODIUM SERPL-SCNC: 142 MMOL/L
TRIGL SERPL-MCNC: 41 MG/DL
WBC # FLD AUTO: 7.52 K/UL

## 2025-05-16 NOTE — PHYSICAL THERAPY INITIAL EVALUATION ADULT - DISCHARGE PLANNER MADE AWARE
no
yes

## 2025-06-02 ENCOUNTER — APPOINTMENT (OUTPATIENT)
Dept: CARDIOLOGY | Facility: CLINIC | Age: 70
End: 2025-06-02
Payer: MEDICARE

## 2025-06-02 ENCOUNTER — NON-APPOINTMENT (OUTPATIENT)
Age: 70
End: 2025-06-02

## 2025-06-02 ENCOUNTER — APPOINTMENT (OUTPATIENT)
Dept: ELECTROPHYSIOLOGY | Facility: CLINIC | Age: 70
End: 2025-06-02
Payer: MEDICARE

## 2025-06-02 VITALS
SYSTOLIC BLOOD PRESSURE: 142 MMHG | HEIGHT: 70 IN | WEIGHT: 315 LBS | OXYGEN SATURATION: 95 % | BODY MASS INDEX: 45.1 KG/M2 | DIASTOLIC BLOOD PRESSURE: 87 MMHG | HEART RATE: 55 BPM

## 2025-06-02 DIAGNOSIS — R73.09 OTHER ABNORMAL GLUCOSE: ICD-10-CM

## 2025-06-02 DIAGNOSIS — E66.813 OBESITY, CLASS 3: ICD-10-CM

## 2025-06-02 DIAGNOSIS — I10 ESSENTIAL (PRIMARY) HYPERTENSION: ICD-10-CM

## 2025-06-02 DIAGNOSIS — E78.5 HYPERLIPIDEMIA, UNSPECIFIED: ICD-10-CM

## 2025-06-02 DIAGNOSIS — I50.20 UNSPECIFIED SYSTOLIC (CONGESTIVE) HEART FAILURE: ICD-10-CM

## 2025-06-02 DIAGNOSIS — R06.02 SHORTNESS OF BREATH: ICD-10-CM

## 2025-06-02 DIAGNOSIS — I87.2 VENOUS INSUFFICIENCY (CHRONIC) (PERIPHERAL): ICD-10-CM

## 2025-06-02 DIAGNOSIS — E78.00 PURE HYPERCHOLESTEROLEMIA, UNSPECIFIED: ICD-10-CM

## 2025-06-02 DIAGNOSIS — I48.91 UNSPECIFIED ATRIAL FIBRILLATION: ICD-10-CM

## 2025-06-02 DIAGNOSIS — E66.01 OBESITY, CLASS 3: ICD-10-CM

## 2025-06-02 DIAGNOSIS — I48.0 PAROXYSMAL ATRIAL FIBRILLATION: ICD-10-CM

## 2025-06-02 DIAGNOSIS — I50.43 ACUTE ON CHRONIC COMBINED SYSTOLIC (CONGESTIVE) AND DIASTOLIC (CONGESTIVE) HEART FAILURE: ICD-10-CM

## 2025-06-02 PROCEDURE — 93000 ELECTROCARDIOGRAM COMPLETE: CPT

## 2025-06-02 PROCEDURE — 99214 OFFICE O/P EST MOD 30 MIN: CPT

## 2025-06-02 PROCEDURE — 99214 OFFICE O/P EST MOD 30 MIN: CPT | Mod: 25

## 2025-08-05 ENCOUNTER — APPOINTMENT (OUTPATIENT)
Dept: CV DIAGNOSITCS | Facility: HOSPITAL | Age: 70
End: 2025-08-05

## 2025-08-05 ENCOUNTER — APPOINTMENT (OUTPATIENT)
Dept: CV DIAGNOSTICS | Facility: HOSPITAL | Age: 70
End: 2025-08-05

## 2025-08-05 ENCOUNTER — RESULT REVIEW (OUTPATIENT)
Age: 70
End: 2025-08-05

## 2025-08-05 ENCOUNTER — OUTPATIENT (OUTPATIENT)
Dept: OUTPATIENT SERVICES | Facility: HOSPITAL | Age: 70
LOS: 1 days | End: 2025-08-05
Payer: MEDICARE

## 2025-08-05 DIAGNOSIS — Z98.890 OTHER SPECIFIED POSTPROCEDURAL STATES: Chronic | ICD-10-CM

## 2025-08-05 DIAGNOSIS — I50.43 ACUTE ON CHRONIC COMBINED SYSTOLIC (CONGESTIVE) AND DIASTOLIC (CONGESTIVE) HEART FAILURE: ICD-10-CM

## 2025-08-05 PROCEDURE — 93306 TTE W/DOPPLER COMPLETE: CPT | Mod: 26

## 2025-08-05 PROCEDURE — 93018 CV STRESS TEST I&R ONLY: CPT | Mod: GC

## 2025-08-05 PROCEDURE — 78452 HT MUSCLE IMAGE SPECT MULT: CPT | Mod: 26

## 2025-08-05 PROCEDURE — 93016 CV STRESS TEST SUPVJ ONLY: CPT | Mod: GC

## 2025-08-14 ENCOUNTER — NON-APPOINTMENT (OUTPATIENT)
Age: 70
End: 2025-08-14

## 2025-08-14 DIAGNOSIS — R94.39 ABNORMAL RESULT OF OTHER CARDIOVASCULAR FUNCTION STUDY: ICD-10-CM

## 2025-09-03 ENCOUNTER — TRANSCRIPTION ENCOUNTER (OUTPATIENT)
Age: 70
End: 2025-09-03

## 2025-09-03 ENCOUNTER — OUTPATIENT (OUTPATIENT)
Dept: OUTPATIENT SERVICES | Facility: HOSPITAL | Age: 70
LOS: 1 days | End: 2025-09-03
Payer: MEDICARE

## 2025-09-03 VITALS
TEMPERATURE: 98 F | HEIGHT: 69 IN | SYSTOLIC BLOOD PRESSURE: 140 MMHG | WEIGHT: 287.04 LBS | HEART RATE: 51 BPM | RESPIRATION RATE: 14 BRPM | DIASTOLIC BLOOD PRESSURE: 99 MMHG

## 2025-09-03 VITALS — RESPIRATION RATE: 16 BRPM | OXYGEN SATURATION: 99 % | HEART RATE: 57 BPM

## 2025-09-03 DIAGNOSIS — R94.39 ABNORMAL RESULT OF OTHER CARDIOVASCULAR FUNCTION STUDY: ICD-10-CM

## 2025-09-03 DIAGNOSIS — Z98.890 OTHER SPECIFIED POSTPROCEDURAL STATES: Chronic | ICD-10-CM

## 2025-09-03 LAB
ANION GAP SERPL CALC-SCNC: 9 MMOL/L — SIGNIFICANT CHANGE UP (ref 7–14)
ANISOCYTOSIS BLD QL: ABNORMAL
BASOPHILS # BLD MANUAL: 0.14 K/UL — SIGNIFICANT CHANGE UP (ref 0–0.2)
BASOPHILS NFR BLD MANUAL: 1.7 % — SIGNIFICANT CHANGE UP (ref 0–2)
BUN SERPL-MCNC: 18 MG/DL — SIGNIFICANT CHANGE UP (ref 7–23)
CALCIUM SERPL-MCNC: 9.3 MG/DL — SIGNIFICANT CHANGE UP (ref 8.4–10.5)
CHLORIDE SERPL-SCNC: 104 MMOL/L — SIGNIFICANT CHANGE UP (ref 98–107)
CO2 SERPL-SCNC: 27 MMOL/L — SIGNIFICANT CHANGE UP (ref 22–31)
CREAT SERPL-MCNC: 0.8 MG/DL — SIGNIFICANT CHANGE UP (ref 0.5–1.3)
EGFR: 95 ML/MIN/1.73M2 — SIGNIFICANT CHANGE UP
EGFR: 95 ML/MIN/1.73M2 — SIGNIFICANT CHANGE UP
EOSINOPHIL # BLD MANUAL: 0.06 K/UL — SIGNIFICANT CHANGE UP (ref 0–0.5)
EOSINOPHIL NFR BLD MANUAL: 0.8 % — SIGNIFICANT CHANGE UP (ref 0–6)
GLUCOSE SERPL-MCNC: 112 MG/DL — HIGH (ref 70–99)
HCT VFR BLD CALC: 50.8 % — HIGH (ref 39–50)
HGB BLD-MCNC: 15.6 G/DL — SIGNIFICANT CHANGE UP (ref 13–17)
HYPOCHROMIA BLD QL: ABNORMAL
LYMPHOCYTES # BLD MANUAL: 2.15 K/UL — SIGNIFICANT CHANGE UP (ref 1–3.3)
LYMPHOCYTES NFR BLD MANUAL: 27.1 % — SIGNIFICANT CHANGE UP (ref 13–44)
MANUAL REACTIVE LYMPHOCYTES #: 0.41 K/UL — SIGNIFICANT CHANGE UP (ref 0–0.63)
MCHC RBC-ENTMCNC: 19.4 PG — LOW (ref 27–34)
MCHC RBC-ENTMCNC: 30.7 G/DL — LOW (ref 32–36)
MCV RBC AUTO: 63.3 FL — LOW (ref 80–100)
MICROCYTES BLD QL: ABNORMAL
MONOCYTES # BLD MANUAL: 0.14 K/UL — SIGNIFICANT CHANGE UP (ref 0–0.9)
MONOCYTES NFR BLD MANUAL: 1.7 % — LOW (ref 2–14)
NEUTROPHILS # BLD MANUAL: 5.06 K/UL — SIGNIFICANT CHANGE UP (ref 1.8–7.4)
NEUTROPHILS NFR BLD MANUAL: 63.6 % — SIGNIFICANT CHANGE UP (ref 43–77)
NRBC # BLD AUTO: 0 K/UL — SIGNIFICANT CHANGE UP (ref 0–0)
NRBC # FLD: 0 K/UL — SIGNIFICANT CHANGE UP (ref 0–0)
NRBC BLD AUTO-RTO: 0 /100 WBCS — SIGNIFICANT CHANGE UP (ref 0–0)
PLAT MORPH BLD: ABNORMAL
PLATELET # BLD AUTO: 220 K/UL — SIGNIFICANT CHANGE UP (ref 150–400)
PLATELET COUNT - ESTIMATE: NORMAL — SIGNIFICANT CHANGE UP
PMV BLD: SIGNIFICANT CHANGE UP FL (ref 7–13)
POIKILOCYTOSIS BLD QL AUTO: SLIGHT — SIGNIFICANT CHANGE UP
POLYCHROMASIA BLD QL SMEAR: ABNORMAL
POTASSIUM SERPL-MCNC: 4.7 MMOL/L — SIGNIFICANT CHANGE UP (ref 3.5–5.3)
POTASSIUM SERPL-SCNC: 4.7 MMOL/L — SIGNIFICANT CHANGE UP (ref 3.5–5.3)
RBC # BLD: 8.03 M/UL — HIGH (ref 4.2–5.8)
RBC # FLD: 19.4 % — HIGH (ref 10.3–14.5)
RBC BLD AUTO: ABNORMAL
SICKLE CELLS BLD QL SMEAR: SLIGHT
SMUDGE CELLS # BLD: PRESENT
SODIUM SERPL-SCNC: 140 MMOL/L — SIGNIFICANT CHANGE UP (ref 135–145)
TARGETS BLD QL SMEAR: SLIGHT — SIGNIFICANT CHANGE UP
VARIANT LYMPHS # BLD: 5.1 % — SIGNIFICANT CHANGE UP (ref 0–6)
VARIANT LYMPHS NFR BLD MANUAL: 5.1 % — SIGNIFICANT CHANGE UP (ref 0–6)
WBC # BLD: 7.95 K/UL — SIGNIFICANT CHANGE UP (ref 3.8–10.5)
WBC # FLD AUTO: 7.95 K/UL — SIGNIFICANT CHANGE UP (ref 3.8–10.5)

## 2025-09-03 PROCEDURE — 93458 L HRT ARTERY/VENTRICLE ANGIO: CPT | Mod: 26

## 2025-09-03 PROCEDURE — 99152 MOD SED SAME PHYS/QHP 5/>YRS: CPT

## 2025-09-03 PROCEDURE — 93010 ELECTROCARDIOGRAM REPORT: CPT

## 2025-09-03 RX ADMIN — Medication 75 MILLILITER(S): at 08:45

## 2025-09-03 RX ADMIN — Medication 1000 MILLILITER(S): at 08:45

## (undated) DEVICE — GOWN LG

## (undated) DEVICE — TUBING MEDI-VAC W MAXIGRIP CONNECTORS 1/4"X6'

## (undated) DEVICE — CONTAINER FORMALIN 80ML YELLOW

## (undated) DEVICE — FACESHIELD FULL VISOR

## (undated) DEVICE — TUBING SUCTION NONCONDUCTIVE 6MM X 12FT

## (undated) DEVICE — FORCEP RADIAL JAW 4 JUMBO 2.8MM 3.2MM 240CM ORANGE DISP

## (undated) DEVICE — DRSG 2X2

## (undated) DEVICE — ELCTR ECG CONDUCTIVE ADHESIVE

## (undated) DEVICE — CATH IV SAFE BC 22G X 1" (BLUE)

## (undated) DEVICE — ELCTR GROUNDING PAD ADULT COVIDIEN

## (undated) DEVICE — TUBING IV SET GRAVITY 3Y 100" MACRO

## (undated) DEVICE — SALIVA EJECTOR (BLUE)

## (undated) DEVICE — DRSG BANDAID 0.75X3"

## (undated) DEVICE — BASIN EMESIS 10IN GRADUATED MAUVE

## (undated) DEVICE — BIOPSY FORCEP RADIAL JAW 4 STANDARD WITH NEEDLE

## (undated) DEVICE — PACK IV START WITH CHG

## (undated) DEVICE — LINE BREATHE SAMPLNG

## (undated) DEVICE — DRSG CURITY GAUZE SPONGE 4 X 4" 12-PLY NON-STERILE

## (undated) DEVICE — BIOPSY FORCEP COLD DISP

## (undated) DEVICE — LUBRICATING JELLY HR ONE SHOT 3G